# Patient Record
Sex: MALE | Race: WHITE | NOT HISPANIC OR LATINO | Employment: FULL TIME | URBAN - METROPOLITAN AREA
[De-identification: names, ages, dates, MRNs, and addresses within clinical notes are randomized per-mention and may not be internally consistent; named-entity substitution may affect disease eponyms.]

---

## 2017-11-27 ENCOUNTER — TRANSCRIBE ORDERS (OUTPATIENT)
Dept: ADMINISTRATIVE | Facility: HOSPITAL | Age: 42
End: 2017-11-27

## 2017-11-27 DIAGNOSIS — R94.5 NONSPECIFIC ABNORMAL RESULTS OF LIVER FUNCTION STUDY: Primary | ICD-10-CM

## 2017-11-27 DIAGNOSIS — D69.6 ACQUIRED THROMBOCYTOPENIA (HCC): ICD-10-CM

## 2017-11-28 ENCOUNTER — HOSPITAL ENCOUNTER (OUTPATIENT)
Dept: ULTRASOUND IMAGING | Facility: HOSPITAL | Age: 42
Discharge: HOME | End: 2017-11-28
Attending: INTERNAL MEDICINE
Payer: COMMERCIAL

## 2017-11-28 DIAGNOSIS — R94.5 NONSPECIFIC ABNORMAL RESULTS OF LIVER FUNCTION STUDY: ICD-10-CM

## 2017-11-28 DIAGNOSIS — D69.6 ACQUIRED THROMBOCYTOPENIA (HCC): ICD-10-CM

## 2017-11-28 PROCEDURE — 76700 US EXAM ABDOM COMPLETE: CPT

## 2017-12-20 LAB — HCV AB SER-ACNC: NON REACTIVE

## 2019-08-12 ENCOUNTER — HOSPITAL ENCOUNTER (EMERGENCY)
Facility: HOSPITAL | Age: 44
Discharge: HOME | End: 2019-08-12
Attending: EMERGENCY MEDICINE
Payer: COMMERCIAL

## 2019-08-12 VITALS
TEMPERATURE: 98.5 F | WEIGHT: 210 LBS | SYSTOLIC BLOOD PRESSURE: 132 MMHG | RESPIRATION RATE: 16 BRPM | OXYGEN SATURATION: 97 % | HEART RATE: 79 BPM | BODY MASS INDEX: 30.06 KG/M2 | DIASTOLIC BLOOD PRESSURE: 69 MMHG | HEIGHT: 70 IN

## 2019-08-12 DIAGNOSIS — M79.671 RIGHT FOOT PAIN: Primary | ICD-10-CM

## 2019-08-12 PROCEDURE — 99282 EMERGENCY DEPT VISIT SF MDM: CPT | Performed by: EMERGENCY MEDICINE

## 2019-08-12 PROCEDURE — 99282 EMERGENCY DEPT VISIT SF MDM: CPT

## 2019-08-12 NOTE — ED PROVIDER NOTES
History  Chief Complaint   Patient presents with    Foot Pain     Pt presents to the ED with right heel pain onset over the last 24 hrs ago  Pt reports hurts with ambulating or bearing weight  The patient presents with right heel pain which he first noticed this morning  He says he woke up with a cramp in his right heel but it went away  Pt is on his feet all day for work and this has only made the pain worse  He took 2 Advil but they wore off by lunch  He denies any recent trauma to the foot and states his pain is only present in the heel  None       Past Medical History:   Diagnosis Date    Hypertension     Lupus ([facility] [address] )        History reviewed  No pertinent surgical history  History reviewed  No pertinent family history  I have reviewed and agree with the history as documented  Social History     Tobacco Use    Smoking status: Never Smoker    Smokeless tobacco: Never Used   Substance Use Topics    Alcohol use: Not Currently    Drug use: Never        Review of Systems   Constitutional: Negative for fatigue and fever  Respiratory: Negative for cough and shortness of breath  Cardiovascular: Negative for chest pain and palpitations  Gastrointestinal: Negative for abdominal pain and nausea  Musculoskeletal: Positive for gait problem  Negative for back pain and joint swelling  Right heel pain    Skin: Negative for color change and wound  Neurological: Negative for light-headedness and headaches  All other systems reviewed and are negative        Physical Exam  ED Triage Vitals [08/12/19 1601]   Temperature Pulse Respirations Blood Pressure SpO2   98 5 °F (36 9 °C) 79 16 132/69 97 %      Temp Source Heart Rate Source Patient Position - Orthostatic VS BP Location FiO2 (%)   Oral Monitor Sitting Right arm --      Pain Score       8             Orthostatic Vital Signs  Vitals:    08/12/19 1601   BP: 132/69   Pulse: 79   Patient Position - Orthostatic VS: Sitting Physical Exam   Constitutional: He is oriented to person, place, and time  He appears well-developed and well-nourished  HENT:   Head: Normocephalic and atraumatic  Eyes: Pupils are equal, round, and reactive to light  Conjunctivae and EOM are normal    Cardiovascular: Normal rate, regular rhythm and normal heart sounds  Pulmonary/Chest: Effort normal and breath sounds normal    Abdominal: Soft  Bowel sounds are normal    Musculoskeletal: He exhibits tenderness  Right heel pain tenderness on palpation  There is some pain with dorsiflexion  There is no pain anywhere else in the foot  Neurological: He is alert and oriented to person, place, and time  Skin: Skin is warm and dry  Psychiatric: He has a normal mood and affect  His behavior is normal        ED Medications  Medications - No data to display    Diagnostic Studies  Results Reviewed     None                 No orders to display         Procedures  Procedures        ED Course                               MDM  Number of Diagnoses or Management Options  Right foot pain: new and requires workup  Diagnosis management comments: Patient presents with right heel pain that began this morning  He states that he had a cramp in his foot but eventually worked out  He is on his feet all day long for work and that is only exacerbated his pain throughout the day  He took 2 Advil this morning and they wore off by lunch  On physical exam there is tenderness to palpation in the heel but there is no pain in the arch or ball of the foot  Patient states he has had bone spurs on the top of his feet  Patient symptoms consistent with heel spur  Patient should rest and ice his heal   Patient can take 2-3 advil every 6hrs as needed  He should follow up with his orthopedist or a podiatrist if the problem continues  Provided him with a work night for light duty over the next 2 days         Disposition  Final diagnoses:   Right foot pain     Time reflects when diagnosis was documented in both MDM as applicable and the Disposition within this note     Time User Action Codes Description Comment    8/12/2019  4:20 PM Booker Schmidvirgil Add [C00 111] Right foot pain       ED Disposition     ED Disposition Condition Date/Time Comment    Discharge Stable Mon Aug 12, 2019  4:20  [address] discharge to home/self care  Follow-up Information     Follow up With Specialties Details Why Contact Info    Jacklyn Leung MD Internal Medicine  If symptoms worsen [place] [id number] [facility]  [phone]            There are no discharge medications for this patient  No discharge procedures on file  ED Provider  Attending physically available and evaluated [address]  I managed the patient along with the ED Attending      Electronically Signed by         Myra Maurer MD  08/12/19 8018

## 2019-08-12 NOTE — ED ATTENDING ATTESTATION
Jase Gentile MD, saw and evaluated the patient  I have discussed the patient with the resident/non-physician practitioner and agree with the resident's/non-physician practitioner's findings, Plan of Care, and MDM as documented in the resident's/non-physician practitioner's note, except where noted  All available labs and Radiology studies were reviewed  I was present for key portions of any procedure(s) performed by the resident/non-physician practitioner and I was immediately available to provide assistance  At this point I agree with the current assessment done in the Emergency Department  I have conducted an independent evaluation of this patient a history and physical is as follows: This is a 53 y o  old male who presents to the ED for evaluation of foot pain  Since yesterday, pain in the R heel  Sharp pain in heel with walking, specifically with applying pressure to the area  Took 2 ibuprofen today, no improvement  Works as , constantly on his feet  No trauma  VS and nursing notes reviewed  General: Appears in NAD, awake, alert, speaking normally in full sentences  Well-nourished, well-developed  Appears stated age  Head: Normocephalic, atraumatic  Eyes: EOMI  Vision grossly normal  No subconjunctival hemorrhages or occular discharge noted  Symmetrical lids  ENT: Atraumatic external nose and ears  No stridor  Normal phonation  No drooling  Normal swallowing  Neck: No JVD  FROM  No goiter  CV: No pallor  Normal rate  Lungs: No tachypnea  No respiratory distress  MSK: Moving all extremities equally, no peripheral edema  TTP R heel  NV intact  5/5 strngth  Skin: Dry, intact  No cyanosis  Neuro: Awake, alert, GCS15  CN II-XII grossly intact  Grossly normal gait  Psychiatric/Behavioral: Appropriate mood and affect  A/P: This is a 53 y o  male who presents to the ED for evaluation of foot pain  Etiology is unclear, possibly new shoes, vs heel spur   Will rec NSAIDs, ICE, work note for 2d of off loading        Critical Care Time  Procedures

## 2020-03-03 ENCOUNTER — APPOINTMENT (OUTPATIENT)
Dept: RADIOLOGY | Facility: AMBULARY SURGERY CENTER | Age: 45
End: 2020-03-03
Payer: COMMERCIAL

## 2020-03-03 VITALS
DIASTOLIC BLOOD PRESSURE: 86 MMHG | SYSTOLIC BLOOD PRESSURE: 136 MMHG | HEIGHT: 66 IN | WEIGHT: 236.2 LBS | HEART RATE: 87 BPM | BODY MASS INDEX: 37.96 KG/M2

## 2020-03-03 DIAGNOSIS — M25.551 RIGHT HIP PAIN: ICD-10-CM

## 2020-03-03 DIAGNOSIS — M25.551 RIGHT HIP PAIN: Primary | ICD-10-CM

## 2020-03-03 PROCEDURE — 73502 X-RAY EXAM HIP UNI 2-3 VIEWS: CPT

## 2020-03-03 PROCEDURE — 99243 OFF/OP CNSLTJ NEW/EST LOW 30: CPT | Performed by: PHYSICAL MEDICINE & REHABILITATION

## 2020-03-03 RX ORDER — LISINOPRIL 40 MG/1
40 TABLET ORAL DAILY
COMMUNITY
End: 2021-02-09 | Stop reason: SDUPTHER

## 2020-03-03 RX ORDER — ATENOLOL 50 MG/1
50 TABLET ORAL DAILY
COMMUNITY
End: 2021-06-29 | Stop reason: SDUPTHER

## 2020-03-03 RX ORDER — DIPHENOXYLATE HYDROCHLORIDE AND ATROPINE SULFATE 2.5; .025 MG/1; MG/1
2 TABLET ORAL DAILY
COMMUNITY

## 2020-03-03 RX ORDER — ASPIRIN 81 MG/1
81 TABLET, CHEWABLE ORAL DAILY
COMMUNITY

## 2020-03-03 NOTE — PROGRESS NOTES
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  No acute osseous abnormalities  Impression:  Right hip pain likely secondary to hip flexor/iliopsoas tendinitis which has fully resolved after a steroid burst   The patient does have a history of systemic lupus erythematosus and is susceptible to having avascular necrosis of the femoral head  In light of this, we obtained x-rays of his right hip which show a cam deformity of the femoral head  At this point, his pain has resolved after the steroid burst   His examination today is within normal limits  I will see him back if needed  No follow-ups on file  HPI:  Nvaid Ridley is a 50 y o  male  who presents for evaluation of   Chief Complaint   Patient presents with    Left Leg - Pain     Onset/Mechanism:  Pain that he has had for many months  Denies any inciting factor  Location:  Right groin and anterior hip region  Radiation: To about mid quadriceps region  Quality:  Not applicable now but it was throbbing  Provocative:  Not applicable  Severity:  0/10  Associated Symptoms:  When it had happened, he felt like his leg was giving out on him  Treatment so far:  Steroid burst which fully resolved all his symptoms  Review of Systems   Constitutional: Positive for activity change  Negative for fever  HENT: Negative for sore throat  Eyes: Negative for visual disturbance  Respiratory: Negative for shortness of breath  Cardiovascular: Negative for chest pain  Gastrointestinal: Negative for abdominal pain  Endocrine: Negative for polydipsia  Genitourinary: Negative for difficulty urinating  Musculoskeletal: Positive for arthralgias  Skin: Negative for rash  Allergic/Immunologic: Negative for immunocompromised state  Neurological: Negative for numbness  Hematological: Does not bruise/bleed easily  Psychiatric/Behavioral: Negative for confusion  Following history reviewed and updated:  Past Medical History:   Diagnosis Date    Hypertension     Lupus ([address] )      History reviewed  No pertinent surgical history  Social History   Social History     Substance and Sexual Activity   Alcohol Use Not Currently     Social History     Substance and Sexual Activity   Drug Use Never     Social History     Tobacco Use   Smoking Status Never Smoker   Smokeless Tobacco Never Used     History reviewed  No pertinent family history  No Known Allergies     Constitutional:  /86   Pulse 87   Ht 5' 6" (1 676 m)   Wt 107 kg (236 lb 3 2 oz)   BMI 38 12 kg/m²    General: NAD  Eyes: Anicteric sclerae  Neck: Supple  Lungs: Unlabored breathing  Cardiovascular: No lower extremity edema  Skin: Intact without erythema  Neurologic: Sensation intact to light touch  Psychiatric: Mood and affect are appropriate  Right Hip Exam     Tenderness   The patient is experiencing no tenderness  Range of Motion   The patient has normal right hip ROM  Muscle Strength   The patient has normal right hip strength  Tests   ZARIA: negative    Other   Erythema: absent  Scars: absent  Sensation: normal  Pulse: present      Back Exam     Tenderness   The patient is experiencing no tenderness  Range of Motion   The patient has normal back ROM  Muscle Strength   The patient has normal back strength  Reflexes   Patellar: 3/4  Achilles: 3/4    Other   Toe walk: normal  Heel walk: normal  Sensation: normal  Gait: normal   Erythema: no back redness  Scars: absent             Procedures - none for this visit  This document was recorded using voice recognition software and errors may be noted

## 2020-03-03 NOTE — LETTER
To Whom It May Concern,    Elaine De is under my professional care  He was seen in my office on March 3, 2020  Please excuse Elaine De from work missed on this appointment date  If you have any questions or concerns, please don't hesitate to call          Sincerely,          Morgan Villar, DO

## 2021-02-09 DIAGNOSIS — I10 ESSENTIAL HYPERTENSION: Primary | ICD-10-CM

## 2021-02-09 RX ORDER — LISINOPRIL 40 MG/1
40 TABLET ORAL DAILY
Qty: 90 TABLET | Refills: 1 | Status: SHIPPED | OUTPATIENT
Start: 2021-02-09 | End: 2021-08-10 | Stop reason: SDUPTHER

## 2021-02-27 PROBLEM — M32.9 SLE (SYSTEMIC LUPUS ERYTHEMATOSUS RELATED SYNDROME) (HCC): Status: ACTIVE | Noted: 2021-02-27

## 2021-02-27 PROBLEM — E55.9 VITAMIN D DEFICIENCY: Status: ACTIVE | Noted: 2021-02-27

## 2021-02-27 PROBLEM — R73.9 HYPERGLYCEMIA: Status: ACTIVE | Noted: 2021-02-27

## 2021-02-27 PROBLEM — E78.2 MIXED HYPERLIPIDEMIA: Status: ACTIVE | Noted: 2021-02-27

## 2021-02-27 PROBLEM — D69.6 THROMBOCYTOPENIA (HCC): Status: ACTIVE | Noted: 2021-02-27

## 2021-02-27 PROBLEM — I10 ESSENTIAL HYPERTENSION: Status: ACTIVE | Noted: 2021-02-27

## 2021-02-27 PROBLEM — K21.9 GE REFLUX: Status: ACTIVE | Noted: 2021-02-27

## 2021-02-28 ENCOUNTER — TELEPHONE (OUTPATIENT)
Dept: OTHER | Facility: OTHER | Age: 46
End: 2021-02-28

## 2021-03-31 ENCOUNTER — TELEMEDICINE (OUTPATIENT)
Dept: INTERNAL MEDICINE CLINIC | Facility: CLINIC | Age: 46
End: 2021-03-31
Payer: COMMERCIAL

## 2021-03-31 DIAGNOSIS — I10 ESSENTIAL HYPERTENSION: ICD-10-CM

## 2021-03-31 DIAGNOSIS — J06.9 UPPER RESPIRATORY TRACT INFECTION, UNSPECIFIED TYPE: Primary | ICD-10-CM

## 2021-03-31 DIAGNOSIS — M32.9 SLE (SYSTEMIC LUPUS ERYTHEMATOSUS RELATED SYNDROME) (HCC): ICD-10-CM

## 2021-03-31 PROCEDURE — 99213 OFFICE O/P EST LOW 20 MIN: CPT | Performed by: INTERNAL MEDICINE

## 2021-03-31 RX ORDER — AZITHROMYCIN 250 MG/1
TABLET, FILM COATED ORAL
Qty: 6 TABLET | Refills: 0 | Status: SHIPPED | OUTPATIENT
Start: 2021-03-31 | End: 2021-04-05

## 2021-03-31 NOTE — ASSESSMENT & PLAN NOTE
Viral versus bacterial infection  Advised to increase fluids  Advised to take acetaminophen p r n  for fever     Will check for COVID-19 infection  Meanwhile will start Z-Carlton  Patient advised to stay home until get COVID-19 test result

## 2021-03-31 NOTE — ASSESSMENT & PLAN NOTE
Patient had seen Rheumatology in the past   Presently only has is facial rash otherwise does not have any other significant SLE symptoms

## 2021-03-31 NOTE — PROGRESS NOTES
Virtual Brief Visit  Virtual visit was attempted to perform by using the audion and video component  Patient unable to connect to video component so visit was performed by using only audio component  Assessment/Plan:    Problem List Items Addressed This Visit        Respiratory    Upper respiratory tract infection - Primary     Viral versus bacterial infection  Advised to increase fluids  Advised to take acetaminophen p r n  for fever     Will check for COVID-19 infection  Meanwhile will start Z-Carlton  Patient advised to stay home until get COVID-19 test result  Relevant Medications    azithromycin (Zithromax) 250 mg tablet    Other Relevant Orders    Novel Coronavirus (Covid-19),PCR [facility]       Cardiovascular and Mediastinum    Essential hypertension     Patient states his blood pressure is okay  Advised to continue present medications  Musculoskeletal and Integument    SLE (systemic lupus erythematosus related syndrome) ([facility] [address] )     Patient had seen Rheumatology in the past   Presently only has is facial rash otherwise does not have any other significant SLE symptoms  Reason for visit is No chief complaint on file  Encounter provider Jaycee Price MD    Provider located at [address] [place] [address] Road  [phone]    Recent Visits  No visits were found meeting these conditions  Showing recent visits within past 7 days and meeting all other requirements     Today's Visits  Date Type Provider Dept   03/31/21 Telemedicine Jayece Price MD [address] [facility]   Showing today's visits and meeting all other requirements     Future Appointments  No visits were found meeting these conditions  Showing future appointments within next 150 days and meeting all other requirements        After connecting through telephone, the patient was identified by name and date of birth   Filemon Deya Zaheer was informed that this is a telemedicine visit and that the visit is being conducted through telephone  My office door was closed  No one else was in the room  He acknowledged consent and understanding of privacy and security of the platform  The patient has agreed to participate and understands he can discontinue the visit at any time  Patient is aware this is a billable service  Subjective:  Fever, sore throat, sinus congestion    Cherise Noyola is a 48 y o  male   HPI   78-year-old white male patient complain of developed fever and sore throat and sinus congestion since yesterday  Has slight cough  Denies any chest pain or shortness of breath  Denies any nausea, vomiting, diarrhea  Denies any exposure to known case of COVID-19 infected person  Patient started taking coricidin  BP  Geanie Gibes Sore throat seems to be worsening  Past Medical History:   Diagnosis Date    Allergic rhinitis     Constipation     Elevated liver enzymes     Elevated liver enzymes     Essential hypertension 2/27/2021    Fatigue     GE reflux     GE reflux 2/27/2021    Hyperglycemia 2/27/2021    Hypertension     Lupus (HCC)     Mixed hyperlipidemia 2/27/2021    Obesity     Rosacea     Skin lesion     SLE (systemic lupus erythematosus related syndrome) ([facility] [address] ) 2/27/2021    Systemic lupus erythematosus (HCC)     Thrombocytasthenia ([facility] [address] )     Thrombocytopenia ([facility] [address] ) 2/27/2021    Vitamin D deficiency     Vitamin D deficiency 2/27/2021       Past Surgical History:   Procedure Laterality Date    COLONOSCOPY  01/12/2018    by Dr Minerva Johnson; needs f/u 1/2023        Current Outpatient Medications   Medication Sig Dispense Refill    aspirin 81 mg chewable tablet Chew 81 mg daily      atenolol (TENORMIN) 50 mg tablet Take 50 mg by mouth daily      azithromycin (Zithromax) 250 mg tablet Take 2 tablets (500 mg total) by mouth daily for 1 day, THEN 1 tablet (250 mg total) daily for 4 days   6 tablet 0    Cholecalciferol (Vitamin D3 Super Strength) 50 MCG (2000 UT) TABS Take by mouth      lisinopril (ZESTRIL) 40 mg tablet Take 1 tablet (40 mg total) by mouth daily 90 tablet 1    multivitamin (THERAGRAN) TABS Take 2 tablets by mouth daily      Vitamin D, Cholecalciferol, 50 MCG (2000 UT) CAPS Take 1 capsule by mouth daily       No current facility-administered medications for this visit  Allergies   Allergen Reactions    Omeprazole GI Intolerance     He developed constipation       Review of Systems   Constitutional: Positive for fatigue and fever  Negative for chills  HENT: Positive for congestion, rhinorrhea and sore throat  Negative for ear pain, postnasal drip, sinus pain and trouble swallowing  Eyes: Negative for pain and visual disturbance  Respiratory: Positive for cough (Slight cough)  Negative for chest tightness and shortness of breath  Cardiovascular: Negative for chest pain, palpitations and leg swelling  Gastrointestinal: Negative for abdominal pain, blood in stool, constipation, diarrhea and nausea  Genitourinary: Negative for dysuria, flank pain and frequency  Musculoskeletal: Negative for arthralgias and myalgias  Skin: Negative for rash  Neurological: Negative for dizziness, speech difficulty, weakness and headaches  Hematological: Does not bruise/bleed easily  Psychiatric/Behavioral: Negative for behavioral problems  On the phone patient appeared in no acute distress  Patient was awake alert oriented x3  There were no vitals filed for this visit  I spent 20 minutes directly with the patient during this visit    VIRTUAL VISIT [address] acknowledges that he has consented to an online visit or consultation   He understands that the online visit is based solely on information provided by him, and that, in the absence of a face-to-face physical evaluation by the physician, the diagnosis he receives is both limited and provisional in terms of accuracy and completeness  This is not intended to replace a full medical face-to-face evaluation by the physician  Cortez Greco understands and accepts these terms

## 2021-04-01 DIAGNOSIS — J06.9 UPPER RESPIRATORY TRACT INFECTION, UNSPECIFIED TYPE: ICD-10-CM

## 2021-04-01 LAB — SARS-COV-2 RNA RESP QL NAA+PROBE: NEGATIVE

## 2021-04-01 PROCEDURE — U0003 INFECTIOUS AGENT DETECTION BY NUCLEIC ACID (DNA OR RNA); SEVERE ACUTE RESPIRATORY SYNDROME CORONAVIRUS 2 (SARS-COV-2) (CORONAVIRUS DISEASE [COVID-19]), AMPLIFIED PROBE TECHNIQUE, MAKING USE OF HIGH THROUGHPUT TECHNOLOGIES AS DESCRIBED BY CMS-2020-01-R: HCPCS | Performed by: INTERNAL MEDICINE

## 2021-04-01 PROCEDURE — U0005 INFEC AGEN DETEC AMPLI PROBE: HCPCS | Performed by: INTERNAL MEDICINE

## 2021-04-02 ENCOUNTER — TELEPHONE (OUTPATIENT)
Dept: INTERNAL MEDICINE CLINIC | Facility: CLINIC | Age: 46
End: 2021-04-02

## 2021-04-13 ENCOUNTER — TELEPHONE (OUTPATIENT)
Dept: GASTROENTEROLOGY | Facility: CLINIC | Age: 46
End: 2021-04-13

## 2021-04-13 ENCOUNTER — PREP FOR PROCEDURE (OUTPATIENT)
Dept: GASTROENTEROLOGY | Facility: CLINIC | Age: 46
End: 2021-04-13

## 2021-04-13 DIAGNOSIS — K21.00 GASTROESOPHAGEAL REFLUX DISEASE WITH ESOPHAGITIS, UNSPECIFIED WHETHER HEMORRHAGE: Primary | ICD-10-CM

## 2021-04-13 NOTE — TELEPHONE ENCOUNTER
Received message from patient that he is calling to schedule EGD with Dr Cristobal Barney for reflux esophagitis  He stated he is available after 3pm, works M-F 7-3  I will call him later today

## 2021-04-15 ENCOUNTER — IMMUNIZATIONS (OUTPATIENT)
Dept: FAMILY MEDICINE CLINIC | Facility: HOSPITAL | Age: 46
End: 2021-04-15

## 2021-04-15 DIAGNOSIS — Z23 ENCOUNTER FOR IMMUNIZATION: Primary | ICD-10-CM

## 2021-04-15 PROCEDURE — 91300 SARS-COV-2 / COVID-19 MRNA VACCINE (PFIZER-BIONTECH) 30 MCG: CPT

## 2021-04-15 PROCEDURE — 0001A SARS-COV-2 / COVID-19 MRNA VACCINE (PFIZER-BIONTECH) 30 MCG: CPT

## 2021-05-06 ENCOUNTER — HOSPITAL ENCOUNTER (OUTPATIENT)
Dept: GASTROENTEROLOGY | Facility: AMBULATORY SURGERY CENTER | Age: 46
Discharge: HOME | End: 2021-05-06
Payer: COMMERCIAL

## 2021-05-06 ENCOUNTER — ANESTHESIA (OUTPATIENT)
Dept: GASTROENTEROLOGY | Facility: AMBULATORY SURGERY CENTER | Age: 46
End: 2021-05-06
Payer: COMMERCIAL

## 2021-05-06 ENCOUNTER — ANESTHESIA EVENT (OUTPATIENT)
Dept: GASTROENTEROLOGY | Facility: AMBULATORY SURGERY CENTER | Age: 46
End: 2021-05-06

## 2021-05-06 VITALS
WEIGHT: 223 LBS | OXYGEN SATURATION: 97 % | TEMPERATURE: 97.2 F | HEART RATE: 84 BPM | RESPIRATION RATE: 18 BRPM | SYSTOLIC BLOOD PRESSURE: 117 MMHG | BODY MASS INDEX: 37.15 KG/M2 | HEIGHT: 65 IN | DIASTOLIC BLOOD PRESSURE: 82 MMHG

## 2021-05-06 DIAGNOSIS — K21.00 GASTROESOPHAGEAL REFLUX DISEASE WITH ESOPHAGITIS, UNSPECIFIED WHETHER HEMORRHAGE: ICD-10-CM

## 2021-05-06 PROCEDURE — 43251 EGD REMOVE LESION SNARE: CPT | Performed by: INTERNAL MEDICINE

## 2021-05-06 PROCEDURE — 00731 ANES UPR GI NDSC PX NOS: CPT | Performed by: NURSE ANESTHETIST, CERTIFIED REGISTERED

## 2021-05-06 PROCEDURE — 43239 EGD BIOPSY SINGLE/MULTIPLE: CPT | Performed by: INTERNAL MEDICINE

## 2021-05-06 RX ORDER — PROPOFOL 10 MG/ML
INJECTION, EMULSION INTRAVENOUS AS NEEDED
Status: DISCONTINUED | OUTPATIENT
Start: 2021-05-06 | End: 2021-05-06

## 2021-05-06 RX ORDER — LIDOCAINE HYDROCHLORIDE 10 MG/ML
INJECTION, SOLUTION EPIDURAL; INFILTRATION; INTRACAUDAL; PERINEURAL AS NEEDED
Status: DISCONTINUED | OUTPATIENT
Start: 2021-05-06 | End: 2021-05-06

## 2021-05-06 RX ORDER — SODIUM CHLORIDE 9 MG/ML
20 INJECTION, SOLUTION INTRAVENOUS CONTINUOUS
Status: DISCONTINUED | OUTPATIENT
Start: 2021-05-06 | End: 2021-05-10 | Stop reason: HOSPADM

## 2021-05-06 RX ORDER — SODIUM CHLORIDE 9 MG/ML
30 INJECTION, SOLUTION INTRAVENOUS CONTINUOUS
Status: DISCONTINUED | OUTPATIENT
Start: 2021-05-06 | End: 2021-05-10 | Stop reason: HOSPADM

## 2021-05-06 RX ADMIN — PROPOFOL 50 MG: 10 INJECTION, EMULSION INTRAVENOUS at 12:10

## 2021-05-06 RX ADMIN — LIDOCAINE HYDROCHLORIDE 50 MG: 10 INJECTION, SOLUTION EPIDURAL; INFILTRATION; INTRACAUDAL; PERINEURAL at 12:00

## 2021-05-06 RX ADMIN — PROPOFOL 150 MG: 10 INJECTION, EMULSION INTRAVENOUS at 12:00

## 2021-05-06 RX ADMIN — SODIUM CHLORIDE: 9 INJECTION, SOLUTION INTRAVENOUS at 11:55

## 2021-05-06 RX ADMIN — PROPOFOL 150 MG: 10 INJECTION, EMULSION INTRAVENOUS at 12:06

## 2021-05-06 NOTE — DISCHARGE INSTRUCTIONS
Pantoprazole (By mouth)   Pantoprazole (pan-TOE-pra-zole)  Treats gastroesophageal reflux disease (GERD), a damaged esophagus, and conditions that cause your stomach to make too much acid, including Zollinger-Lincoln syndrome  This medicine is a proton pump inhibitor (PPI)  Brand Name(s): Protonix   There may be other brand names for this medicine  When This Medicine Should Not Be Used: This medicine is not right for everyone  Do not use it if you had an allergic reaction to pantoprazole or similar medicines  How to Use This Medicine:   Packet, Tablet, Delayed Release Tablet, Long Acting Tablet  · Your doctor will tell you how much medicine to use  Do not use more than directed  · Delayed-release tablet: Swallow the tablet whole  Do not crush, break, or chew it  · Delayed-release packet: Take the medicine mixed in apple juice or applesauce at least 30 minutes before a meal  It may also be given using a nasogastric tube when mixed in apple juice only  ? To prepare with applesauce:   § Mix the packet contents with 1 teaspoon of applesauce  Do not mix with water or other liquids or food  Do not divide the packet contents to make smaller doses  § Swallow the mixture within 10 minutes after you mix it  Do not chew or crush the granules  § Sip some water after you take the mixture to make sure you swallow all of the medicine  ? To prepare with apple juice:   § Mix the packet contents with 1 teaspoon of apple juice in a small cup  Do not divide the packet contents to make smaller doses  § Stir for 5 seconds and drink the mixture immediately  Do not chew or crush the granules  § To make sure you get all of the medicine, add more apple juice to the cup  Drink it immediately  ? To prepare for a feeding tube:   § Pour the packet contents in a 2-ounce (60 milliliter [mL]) catheter-tip syringe  § Add 10 mL of apple juice to the syringe  Add the mixture to the tube   Gently tap or shake the barrel of the syringe to help empty it  § Add 10 mL of apple juice to the syringe and put it in the tube  Do this at least 2 times  There should be no granules left in the syringe  · This medicine should come with a Medication Guide  Ask your pharmacist for a copy if you do not have one  · Missed dose: Take a dose as soon as you remember  If it is almost time for your next dose, wait until then and take a regular dose  Do not take extra medicine to make up for a missed dose  · Store the medicine in a closed container at room temperature, away from heat, moisture, and direct light  Drugs and Foods to Avoid:   Ask your doctor or pharmacist before using any other medicine, including over-the-counter medicines, vitamins, and herbal products  · Do not use pantoprazole together with medicine that contains rilpivirine  · Some medicines can affect how pantoprazole works  Tell your doctor if you are using any of the following:   ? Ampicillin, atazanavir, dasatinib, digoxin, erlotinib, itraconazole, ketoconazole, methotrexate, mycophenolate mofetil, nelfinavir, nilotinib, saquinavir  ? Blood thinner (including warfarin)  ? Diuretic (water pill)  ? Iron supplements  Warnings While Using This Medicine:   · Tell your doctor if you are pregnant or breastfeeding, or if you have kidney disease, liver disease, lupus, or osteoporosis  · This medicine may cause the following problems:  ? Kidney problems, including acute tubulointerstitial nephritis  ? Increased risk of broken bones in the hip, wrist, or spine (more likely if used several times per day or longer than 1 year)  ? Lupus  ? Fundic gland polyps (abnormal growth in the upper part of your stomach)  · This medicine can cause diarrhea  Call your doctor if the diarrhea becomes severe, does not stop, or is bloody  Do not take any medicine to stop diarrhea until you have talked to your doctor  Diarrhea can occur 2 months or more after you stop taking this medicine    · Tell any doctor or dentist who treats you that you are using this medicine  This medicine may affect certain medical test results  · Your doctor will do lab tests at regular visits to check on the effects of this medicine  Keep all appointments  · Keep all medicine out of the reach of children  Never share your medicine with anyone  Possible Side Effects While Using This Medicine:   Call your doctor right away if you notice any of these side effects:  · Allergic reaction: Itching or hives, swelling in your face or hands, swelling or tingling in your mouth or throat, chest tightness, trouble breathing  · Blistering, peeling, red skin rash  · Fever, joint pain, swelling in your body, unusual weight gain, change in how much or how often you urinate, blood in the urine  · Joint pain, rash on your cheeks or arms that gets worse in the sun  · Seizures, dizziness, uneven heartbeat, muscle cramps or twitching  · Severe diarrhea, stomach cramp or pain, nausea, vomiting, loss of appetite, weight loss  · Unusual tiredness or weakness  If you notice these less serious side effects, talk with your doctor:   · Headache  If you notice other side effects that you think are caused by this medicine, tell your doctor  Call your doctor for medical advice about side effects  You may report side effects to FDA at 5-447-FDA-1062  © Copyright [employer] 2021 Information is for End User's use only and may not be sold, redistributed or otherwise used for commercial purposes  The above information is an  only  It is not intended as medical advice for individual conditions or treatments  Talk to your doctor, nurse or pharmacist before following any medical regimen to see if it is safe and effective for you  Upper Endoscopy   WHAT YOU NEED TO KNOW:   An upper endoscopy is also called an upper gastrointestinal (GI) endoscopy, or an esophagogastroduodenoscopy (EGD)   It is a procedure to examine the inside of your esophagus, stomach, and duodenum (first part of the small intestine) with a scope  You may feel bloated, gassy, or have some abdominal discomfort after your procedure  Your throat may be sore for 24 to 36 hours  You may burp or pass gas from air that is still inside your body  DISCHARGE INSTRUCTIONS:   Seek care immediately if:    You have sudden, severe abdominal pain   You have problems swallowing   You have a large amount of black, sticky bowel movements or blood in your bowel movements   You have sudden trouble breathing   You feel weak, lightheaded, or faint or your heart beats faster than normal for you  Contact your healthcare provider if:    You have a fever and chills   You have nausea or are vomiting   Your abdomen is bloated or feels full and hard   You have abdominal pain   You have black, sticky bowel movements or blood in your bowel movements   You have not had a bowel movement for 3 days after your procedure   You have rash or hives   You have questions or concerns about your procedure  Activity:    Do not lift, strain, or run for 24 hours after your procedure   Rest after your procedure  You have been given medicine to relax you  Do not drive or make important decisions until the day after your procedure  Return to your normal activity as directed   Relieve gas and discomfort from bloating by lying on your right side with a heating pad on your abdomen  You may need to take short walks to help the gas move out  Eat small meals until bloating is relieved  Follow up with your healthcare provider as directed: Write down your questions so you remember to ask them during your visits  If you take a blood thinner, please review the specific instructions from your endoscopist about when you should resume it  These can be found in the Recommendation and Your Medication list sections of this After Visit Summary    Gastroesophageal Reflux Disease   WHAT YOU NEED TO KNOW:   Gastroesophageal reflux disease (GERD) is reflux that occurs more than twice a week for a few weeks  Reflux means acid and food in the stomach back up into the esophagus  It usually causes heartburn and other symptoms  GERD can cause other health problems over time if it is not treated  DISCHARGE INSTRUCTIONS:   Call your local emergency number (911 in the [address]) if:   · You have severe chest pain and sudden trouble breathing  Seek care immediately if:   · You have trouble breathing after you vomit  · You have trouble swallowing, or pain with swallowing  · Your bowel movements are black, bloody, or tarry-looking  · Your vomit looks like coffee grounds or has blood in it  Call your doctor or gastroenterologist if:   · You feel full and cannot burp or vomit  · You vomit large amounts, or you vomit often  · You are losing weight without trying  · Your symptoms get worse or do not improve with treatment  · You have questions or concerns about your condition or care  Medicines:   · Medicines  are used to decrease stomach acid  Medicine may also be used to help your lower esophageal sphincter and stomach contract (tighten) more  · Take your medicine as directed  Contact your healthcare provider if you think your medicine is not helping or if you have side effects  Tell him or her if you are allergic to any medicine  Keep a list of the medicines, vitamins, and herbs you take  Include the amounts, and when and why you take them  Bring the list or the pill bottles to follow-up visits  Carry your medicine list with you in case of an emergency  Manage GERD:   · Do not have foods or drinks that may increase heartburn  These include chocolate, peppermint, fried or fatty foods, drinks that contain caffeine, or carbonated drinks (soda)  Other foods include spicy foods, onions, tomatoes, and tomato-based foods   Do not have foods or drinks that can irritate your esophagus, such as citrus fruits, juices, and alcohol  · Do not eat large meals  When you eat a lot of food at one time, your stomach needs more acid to digest it  Eat 6 small meals each day instead of 3 large ones, and eat slowly  Do not eat meals 2 to 3 hours before bedtime  · Elevate the head of your bed  Place 6-inch blocks under the head of your bed frame  You may also use more than one pillow under your head and shoulders while you sleep  · Maintain a healthy weight  If you are overweight, weight loss may help relieve symptoms of GERD  · Do not smoke  Smoking weakens the lower esophageal sphincter and increases the risk of GERD  Ask your healthcare provider for information if you currently smoke and need help to quit  E-cigarettes or smokeless tobacco still contain nicotine  Talk to your healthcare provider before you use these products  · Do not wear clothing that is tight around your waist   Tight clothing can put pressure on your stomach and cause or worsen GERD symptoms  Follow up with your doctor or gastroenterologist as directed:  Write down your questions so you remember to ask them during your visits  © Copyright [address] Information is for End User's use only and may not be sold, redistributed or otherwise used for commercial purposes  All illustrations and images included in CareNotes® are the copyrighted property of [employer]  or [facility] Michael Vaughn   The above information is an  only  It is not intended as medical advice for individual conditions or treatments  Talk to your doctor, nurse or pharmacist before following any medical regimen to see if it is safe and effective for you

## 2021-05-06 NOTE — H&P
[facility] and Physical -  [facility]  Mor Squires Zaheer 48 y o  male MRN: [id number]                  HPI: Isaak Smiley is a 48y o  year old male who presents for GERD      REVIEW OF SYSTEMS: Per the HPI, and otherwise unremarkable      Historical Information   Past Medical History:   Diagnosis Date    Allergic rhinitis     Constipation     Elevated liver enzymes     hx of    Elevated liver enzymes     Essential hypertension 2/27/2021    Fatigue     Gastric polyps     history of    GE reflux     GE reflux 2/27/2021    GERD (gastroesophageal reflux disease)     Hyperglycemia 2/27/2021    Hypertension     Lupus ([facility] [address] [facility]     Mixed hyperlipidemia 2/27/2021    Obesity     Rosacea     Seizures ([facility] [address] )     6year old sport injury only-no recurrance    Skin lesion     SLE (systemic lupus erythematosus related syndrome) ([facility] [address] ) 2/27/2021    Systemic lupus erythematosus ([facility] [address] )     Thrombocytasthenia ([facility] [address] )     Thrombocytopenia ([facility] [address] ) 2/27/2021    Vitamin D deficiency     Vitamin D deficiency 2/27/2021     Past Surgical History:   Procedure Laterality Date    COLONOSCOPY  01/12/2018    by Dr Jonny Garnica; needs f/u 1/2023     COLONOSCOPY      EGD       Social History   Social History     Substance and Sexual Activity   Alcohol Use Yes    Frequency: 2-4 times a month    Drinks per session: 1 or 2    Binge frequency: Never    Comment: Occasional - As per [employer]-wine or beer     Social History     Substance and Sexual Activity   Drug Use Never     Social History     Tobacco Use   Smoking Status Light Tobacco Smoker    Types: Cigars   Smokeless Tobacco Never Used   Tobacco Comment    once a month-cigar     Family History   Problem Relation Age of Onset    Dementia Mother     Colon cancer Father     Cancer Father         colon    Lung cancer Paternal Grandfather     Colon cancer Paternal Aunt     Skin cancer Paternal Uncle     Colon cancer Maternal Aunt        Meds/Allergies Current Outpatient Medications:     aspirin 81 mg chewable tablet    atenolol (TENORMIN) 50 mg tablet    Cholecalciferol (Vitamin D3 Super Strength) 50 MCG (2000 UT) TABS    lisinopril (ZESTRIL) 40 mg tablet    multivitamin (THERAGRAN) TABS    Vitamin D, Cholecalciferol, 50 MCG (2000 UT) CAPS    Current Facility-Administered Medications:     sodium chloride 0 9 % infusion, 30 mL/hr, Intravenous, Continuous, New Bag at 05/06/21 1155    Facility-Administered Medications Ordered in Other Encounters:     lidocaine (PF) (XYLOCAINE-MPF) 1 % injection, , , PRN, 50 mg at 05/06/21 1200    propofol (DIPRIVAN) 200 MG/20ML bolus injection, , Intravenous, PRN, 150 mg at 05/06/21 1200    Allergies   Allergen Reactions    Omeprazole GI Intolerance     He developed constipation       Objective     /89   Pulse 67   Temp (!) 97 2 °F (36 2 °C) (Temporal)   Resp 18   Ht 5' 5" (1 651 m)   Wt 101 kg (223 lb)   SpO2 96%   BMI 37 11 kg/m²       PHYSICAL EXAM    Gen: NAD  Head: NCAT  CV: RRR  CHEST: Clear  ABD: soft, NT/ND  EXT: no edema      ASSESSMENT/PLAN:  This is a 48y o  year old male here for EGD, and he is stable and optimized for his procedure

## 2021-05-06 NOTE — ANESTHESIA POSTPROCEDURE EVALUATION
Post-Op Assessment Note    CV Status:  Stable  Pain Score: 0    Pain management: adequate     Mental Status:  Sleepy   Hydration Status:  Stable   PONV Controlled:  None   Airway Patency:  Patent      Post Op Vitals Reviewed: Yes      Staff: CRNA         No complications documented      BP   107/62   Temp     Pulse  80   Resp   16   SpO2   98

## 2021-05-06 NOTE — ANESTHESIA PREPROCEDURE EVALUATION
Procedure:  EGD    Relevant Problems   CARDIO   (+) Essential hypertension   (+) Mixed hyperlipidemia      GI/HEPATIC   (+) GE reflux      HEMATOLOGY   (+) Thrombocytopenia (HCC)      PULMONARY   (+) Upper respiratory tract infection        Physical Exam    Airway    Mallampati score: II  TM Distance: >3 FB  Neck ROM: full     Dental   No notable dental hx     Cardiovascular  Rhythm: regular, Rate: normal, Cardiovascular exam normal    Pulmonary  Pulmonary exam normal     Other Findings        Anesthesia Plan  ASA Score- 2     Anesthesia Type- IV sedation with anesthesia with ASA Monitors  Additional Monitors:   Airway Plan:           Plan Factors-    Induction- intravenous  Postoperative Plan-     Informed Consent- Anesthetic plan and risks discussed with patient

## 2021-05-07 ENCOUNTER — IMMUNIZATIONS (OUTPATIENT)
Dept: FAMILY MEDICINE CLINIC | Facility: HOSPITAL | Age: 46
End: 2021-05-07

## 2021-05-07 DIAGNOSIS — Z23 ENCOUNTER FOR IMMUNIZATION: Primary | ICD-10-CM

## 2021-05-07 PROCEDURE — 91300 SARS-COV-2 / COVID-19 MRNA VACCINE (PFIZER-BIONTECH) 30 MCG: CPT

## 2021-05-07 PROCEDURE — 0002A SARS-COV-2 / COVID-19 MRNA VACCINE (PFIZER-BIONTECH) 30 MCG: CPT

## 2021-05-20 ENCOUNTER — TELEPHONE (OUTPATIENT)
Dept: GASTROENTEROLOGY | Facility: CLINIC | Age: 46
End: 2021-05-20

## 2021-05-20 NOTE — TELEPHONE ENCOUNTER
Called patient and informed him of EGD biopsy results, please make sure EGD/ colonoscopy recall is placed for January 2023 per Dr Suzie Deng EGD  recommendations   Thank you

## 2021-06-29 ENCOUNTER — TELEPHONE (OUTPATIENT)
Dept: INTERNAL MEDICINE CLINIC | Facility: CLINIC | Age: 46
End: 2021-06-29

## 2021-06-29 DIAGNOSIS — I10 ESSENTIAL HYPERTENSION: Primary | ICD-10-CM

## 2021-06-29 RX ORDER — ATENOLOL 50 MG/1
50 TABLET ORAL DAILY
Qty: 90 TABLET | Refills: 0 | Status: SHIPPED | OUTPATIENT
Start: 2021-06-29 | End: 2021-10-01 | Stop reason: SDUPTHER

## 2021-06-29 NOTE — TELEPHONE ENCOUNTER
Mishel Munoz please call and schedule him to see me in next couple week as has not seen for last 6 montths    I will refill his medication

## 2021-08-10 ENCOUNTER — TELEPHONE (OUTPATIENT)
Dept: INTERNAL MEDICINE CLINIC | Facility: CLINIC | Age: 46
End: 2021-08-10

## 2021-08-10 DIAGNOSIS — M32.9 SLE (SYSTEMIC LUPUS ERYTHEMATOSUS RELATED SYNDROME) (HCC): ICD-10-CM

## 2021-08-10 DIAGNOSIS — I10 ESSENTIAL HYPERTENSION: ICD-10-CM

## 2021-08-10 DIAGNOSIS — E55.9 VITAMIN D DEFICIENCY: ICD-10-CM

## 2021-08-10 DIAGNOSIS — I10 ESSENTIAL HYPERTENSION: Primary | ICD-10-CM

## 2021-08-10 DIAGNOSIS — E78.2 MIXED HYPERLIPIDEMIA: ICD-10-CM

## 2021-08-10 RX ORDER — LISINOPRIL 40 MG/1
40 TABLET ORAL DAILY
Qty: 90 TABLET | Refills: 0 | Status: SHIPPED | OUTPATIENT
Start: 2021-08-10 | End: 2021-11-16 | Stop reason: SDUPTHER

## 2021-08-10 NOTE — TELEPHONE ENCOUNTER
Scheduled apt for 10/19 (needed late time)     Do you want him to have labs done prior? Also he will need a refill of his Lisinopril 40 mg - sent to [facility]

## 2021-08-11 NOTE — TELEPHONE ENCOUNTER
Please let him know that I ordered his medication  He will need blood test before next appointment    I ordered the  blood test and urine test so he can go to any [facility] without prescription or he can stop by to get prescription of his blood test

## 2021-08-31 ENCOUNTER — OFFICE VISIT (OUTPATIENT)
Dept: INTERNAL MEDICINE CLINIC | Facility: CLINIC | Age: 46
End: 2021-08-31
Payer: COMMERCIAL

## 2021-08-31 VITALS
HEART RATE: 88 BPM | HEIGHT: 66 IN | WEIGHT: 225 LBS | SYSTOLIC BLOOD PRESSURE: 138 MMHG | DIASTOLIC BLOOD PRESSURE: 80 MMHG | OXYGEN SATURATION: 99 % | BODY MASS INDEX: 36.16 KG/M2 | TEMPERATURE: 98.2 F

## 2021-08-31 DIAGNOSIS — I10 ESSENTIAL HYPERTENSION: ICD-10-CM

## 2021-08-31 DIAGNOSIS — E78.2 MIXED HYPERLIPIDEMIA: ICD-10-CM

## 2021-08-31 DIAGNOSIS — R73.9 HYPERGLYCEMIA: ICD-10-CM

## 2021-08-31 DIAGNOSIS — M32.9 SLE (SYSTEMIC LUPUS ERYTHEMATOSUS RELATED SYNDROME) (HCC): ICD-10-CM

## 2021-08-31 DIAGNOSIS — K21.9 GASTROESOPHAGEAL REFLUX DISEASE, UNSPECIFIED WHETHER ESOPHAGITIS PRESENT: ICD-10-CM

## 2021-08-31 DIAGNOSIS — D69.6 THROMBOCYTOPENIA (HCC): ICD-10-CM

## 2021-08-31 DIAGNOSIS — L98.9 SKIN LESION: ICD-10-CM

## 2021-08-31 DIAGNOSIS — E55.9 VITAMIN D DEFICIENCY: ICD-10-CM

## 2021-08-31 DIAGNOSIS — Z00.00 ANNUAL PHYSICAL EXAM: Primary | ICD-10-CM

## 2021-08-31 PROCEDURE — 3725F SCREEN DEPRESSION PERFORMED: CPT | Performed by: INTERNAL MEDICINE

## 2021-08-31 PROCEDURE — 99396 PREV VISIT EST AGE 40-64: CPT | Performed by: INTERNAL MEDICINE

## 2021-08-31 PROCEDURE — 99213 OFFICE O/P EST LOW 20 MIN: CPT | Performed by: INTERNAL MEDICINE

## 2021-08-31 PROCEDURE — 3008F BODY MASS INDEX DOCD: CPT | Performed by: INTERNAL MEDICINE

## 2021-08-31 NOTE — ASSESSMENT & PLAN NOTE
Stable  His platelet count was 559R couple years but now is 133K with a normal hemoglobin and WBC will observe and follow-up

## 2021-08-31 NOTE — PATIENT INSTRUCTIONS

## 2021-08-31 NOTE — PROGRESS NOTES
ADULT ANNUAL [address] INTERNAL MEDICINE    NAME: Reema Schwab  AGE: 48 y o  SEX: male  : 1975     DATE: 2021     Assessment and Plan:     Problem List Items Addressed This Visit     None      Visit Diagnoses     Annual physical exam    -  Primary          Immunizations and preventive care screenings were discussed with patient today  Appropriate education was printed on patient's after visit summary  Counseling:  · Alcohol/drug use: discussed moderation in alcohol intake, the recommendations for healthy alcohol use, and avoidance of illicit drug use  No follow-ups on file  Chief Complaint:     Chief Complaint   Patient presents with    Annual Exam     skin tags x 6 months       History of Present Illness:     Adult Annual Physical   Patient here for a comprehensive physical exam  The patient reports no problems  Diet and Physical Activity  · Diet/Nutrition: well balanced diet  · Exercise: walking  Depression Screening  PHQ-9 Depression Screening    PHQ-9:   Frequency of the following problems over the past two weeks:      Little interest or pleasure in doing things: 0 - not at all  Feeling down, depressed, or hopeless: 0 - not at all  PHQ-2 Score: 0       General Health  · Sleep: sleeps well  · Hearing: normal - bilateral   · Vision: wears glasses  · Dental: regular dental visits   Health  · Symptoms include: none     Review of Systems:     Review of Systems   Constitutional: Negative for chills and fever  HENT: Negative for congestion, ear pain, hearing loss, nosebleeds, sinus pain, sore throat and trouble swallowing  Eyes: Negative for discharge, redness and visual disturbance  Respiratory: Negative for cough, chest tightness and shortness of breath  Cardiovascular: Negative for chest pain and palpitations     Gastrointestinal: Negative for abdominal pain, blood in stool, constipation, diarrhea, nausea and vomiting  Genitourinary: Negative for dysuria, flank pain, frequency and hematuria  Musculoskeletal: Negative for arthralgias, myalgias and neck pain  Skin: Negative for color change and rash  Neurological: Negative for dizziness, speech difficulty, weakness and headaches  Hematological: Does not bruise/bleed easily  Psychiatric/Behavioral: Negative for agitation and behavioral problems           Past Medical History:     Past Medical History:   Diagnosis Date    Allergic rhinitis     Constipation     Elevated liver enzymes     hx of    Elevated liver enzymes     Essential hypertension 2/27/2021    Fatigue     Gastric polyps     history of    GE reflux     GE reflux 2/27/2021    GERD (gastroesophageal reflux disease)     Hyperglycemia 2/27/2021    Hypertension     [facility] ([facility] )     Mixed hyperlipidemia 2/27/2021    Obesity     Rosacea     Seizures ([facility] )     6year old sport injury only-no recurrance    Skin lesion     SLE (systemic lupus erythematosus related syndrome) ([facility] ) 2/27/2021    Systemic lupus erythematosus (HCC)     Thrombocytasthenia ([facility] )     Thrombocytopenia ([facility] ) 2/27/2021    Vitamin D deficiency     Vitamin D deficiency 2/27/2021      Past Surgical History:     Past Surgical History:   Procedure Laterality Date    COLONOSCOPY  01/12/2018    by Dr Emerald Lorenzo; needs f/u 1/2023     COLONOSCOPY      EGD        Family History:     Family History   Problem Relation Age of Onset    Dementia Mother     Colon cancer Father     Cancer Father         colon    Lung cancer Paternal Grandfather     Colon cancer Paternal Aunt     Skin cancer Paternal Uncle     Colon cancer Maternal Aunt       Social History:     Social History     Socioeconomic History    Marital status: /Civil Union     Spouse name: None    Number of children: None    Years of education: None    Highest education level: None   Occupational History    None   Tobacco Use    Smoking status: Light Tobacco Smoker     Types: Cigars    Smokeless tobacco: Never Used    Tobacco comment: once a month-cigar   Vaping Use    Vaping Use: Never used   Substance and Sexual Activity    Alcohol use: Yes     Comment: Occasional - As per [employer]-wine or beer    Drug use: Never    Sexual activity: None   Other Topics Concern    None   Social History Narrative    Annual eye exam: Advise    Annual dental checkup: Sees q6months    - As per [employer]     Social Determinants of Health     Financial Resource Strain:     Difficulty of Paying Living Expenses:    Food Insecurity:     Worried About Running Out of Food in the Last Year:     Ran Out of Food in the Last Year:    Transportation Needs:     Lack of Transportation (Medical):      Lack of Transportation (Non-Medical):    Physical Activity:     Days of Exercise per Week:     Minutes of Exercise per Session:    Stress:     Feeling of Stress :    Social Connections:     Frequency of Communication with Friends and Family:     Frequency of Social Gatherings with Friends and Family:     Attends [employer] Services:     Active Member of Clubs or Organizations:     Attends Club or Organization Meetings:     Marital Status:    Intimate Partner Violence:     Fear of Current or Ex-Partner:     Emotionally Abused:     Physically Abused:     Sexually Abused:       Current Medications:     Current Outpatient Medications   Medication Sig Dispense Refill    aspirin 81 mg chewable tablet Chew 81 mg daily      atenolol (TENORMIN) 50 mg tablet Take 1 tablet (50 mg total) by mouth daily 90 tablet 0    Cholecalciferol (Vitamin D3 Super Strength) 50 MCG (2000 UT) TABS Take by mouth      lisinopril (ZESTRIL) 40 mg tablet Take 1 tablet (40 mg total) by mouth daily 90 tablet 0    multivitamin (THERAGRAN) TABS Take 2 tablets by mouth daily      Vitamin D, Cholecalciferol, 50 MCG (2000 UT) CAPS Take 1 capsule by mouth daily       No current facility-administered medications for this visit  Allergies: Allergies   Allergen Reactions    Omeprazole GI Intolerance     He developed constipation      Physical Exam:     /80 (BP Location: Right arm, Patient Position: Sitting, Cuff Size: Adult)   Pulse 88   Temp 98 2 °F (36 8 °C) (Tympanic)   Ht 5' 6" (1 676 m)   Wt 102 kg (225 lb)   SpO2 99%   BMI 36 32 kg/m²     Physical Exam  Vitals and nursing note reviewed  Constitutional:       Appearance: He is well-developed  He is obese  HENT:      Head: Normocephalic and atraumatic  Right Ear: Tympanic membrane, ear canal and external ear normal       Left Ear: Tympanic membrane, ear canal and external ear normal       Mouth/Throat:      Comments: Patient has face mask on  Eyes:      General: No scleral icterus  Right eye: No discharge  Left eye: No discharge  Extraocular Movements: Extraocular movements intact  Conjunctiva/sclera: Conjunctivae normal    Cardiovascular:      Rate and Rhythm: Normal rate and regular rhythm  Pulses: Normal pulses  Heart sounds: No murmur heard  Pulmonary:      Effort: Pulmonary effort is normal  No respiratory distress  Breath sounds: Normal breath sounds  Abdominal:      General: Bowel sounds are normal       Palpations: Abdomen is soft  Tenderness: There is no abdominal tenderness  Musculoskeletal:         General: Normal range of motion  Cervical back: Normal range of motion and neck supple  Right lower leg: No edema  Left lower leg: No edema  Skin:     General: Skin is warm and dry  Neurological:      General: No focal deficit present  Mental Status: He is alert and oriented to person, place, and time     Psychiatric:         Mood and Affect: Mood normal          Behavior: Behavior normal           Sathish Mederos MD  [address] [place] [address]

## 2021-08-31 NOTE — PROGRESS NOTES
Assessment/Plan:    1  Annual physical exam  Assessment & Plan:  Had a colonoscopy 2018  Needs follow-up in 2023       2  Essential hypertension  Assessment & Plan:  Blood pressure well controlled  Repeat blood pressure was 128/80  Advised to continue present medications and low-salt diet  3  Mixed hyperlipidemia  Assessment & Plan:  Cholesterol increased from 146-150 to triglyceride increased from 137-205 HDL 30 LDL 97  Advised for low-cholesterol, low saturated low carbs diet  Advised to exercise lose weight  4  Hyperglycemia  Assessment & Plan:  Fasting blood sugar 110 advised for low sugar low carbs diet  His hemoglobin A1c was 5 2 in  2019        5  Vitamin D deficiency  Assessment & Plan:  Vitamin-D deficiency resolved after being on vitamin-D supplement  Vitamin-D level was 37  Advised to continue vitamin-D daily  6  Thrombocytopenia ([facility] )  Assessment & Plan:  Stable  His platelet count was 378N couple years but now is 133K with a normal hemoglobin and WBC will observe and follow-up  7  SLE (systemic lupus erythematosus related syndrome) ([facility] )  Assessment & Plan:  Patient has a rash on face otherwise asymptomatic  8  BMI 36 0-36 9,adult    9  Gastroesophageal reflux disease, unspecified whether esophagitis present  Assessment & Plan:  Recently  had a EGD April 2021  Revealed gastric polyps and distal esophageal erythema and superficial ulceration  Was advised pantoprazole 40 mg once aday but  patient states he is not taking just watching diet closely  He was advised follow-up EGD in 1 year      10  Skin lesion  Assessment & Plan:  Has a multiple skin tags and/or seborrheic keratosis type of lesion in bilateral axilla, left groin, right supraclavicular area, and very tiny lesions on face  Will refer to dermatologist     Orders:  -     Ambulatory referral to Dermatology; Future    BMI Counseling: Body mass index is 36 32 kg/m²   The BMI is above normal  Nutrition recommendations include decreasing portion sizes, decreasing fast food intake, consuming healthier snacks, limiting drinks that contain sugar, moderation in carbohydrate intake, reducing intake of saturated and trans fat and reducing intake of cholesterol  Exercise recommendations include exercising 3-5 times per week and strength training exercises  No pharmacotherapy was ordered  Subjective:  Patient presents for annual wellness exam as well as follow-up of his medical problems  Patient ID: Wild Gastelum is a 48 y o  male  HPI   43-year-old white male patient presents for annual wellness exam as well as follow-up of his medical problems  Denies any chest pain, shortness of breath, pain in abdomen  Denies any cough, fever, chills denies any nausea, vomiting, diarrhea got his COVID-19 vaccination last dose was May 2021 complain of skin lesions on face, bilateral axilla, left groin    The following portions of the patient's history were reviewed and updated as appropriate:     Past Medical History:  He has a past medical history of Allergic rhinitis, BMI 36 0-36 9,adult (8/31/2021), Constipation, Elevated liver enzymes, Elevated liver enzymes, Essential hypertension (2/27/2021), Fatigue, Gastric polyps, GE reflux, GE reflux (2/27/2021), GERD (gastroesophageal reflux disease), Hyperglycemia (2/27/2021), Hypertension, Lupus ([facility] ), Mixed hyperlipidemia (2/27/2021), Obesity, Rosacea, Seizures ([facility] ), Skin lesion, Skin lesion (8/31/2021), SLE (systemic lupus erythematosus related syndrome) ([place] [address] ) (2/27/2021), Systemic lupus erythematosus ([place] [address] ), Thrombocytasthenia ([facility] ), Thrombocytopenia ([facility] ) (2/27/2021), Vitamin D deficiency, and Vitamin D deficiency (2/27/2021)  ,  _______________________________________________________________________  Past Surgical History:   has a past surgical history that includes Colonoscopy (01/12/2018);  Colonoscopy; and EGD ,  _______________________________________________________________________  Family History:  family history includes Cancer in his father; Colon cancer in his father, maternal aunt, and paternal aunt; Dementia in his mother; Lung cancer in his paternal grandfather; Skin cancer in his paternal uncle ,  _______________________________________________________________________  Social History:   reports that he has been smoking cigars  He has never used smokeless tobacco  He reports current alcohol use  He reports that he does not use drugs  ,  _______________________________________________________________________  Allergies:  is allergic to omeprazole     _______________________________________________________________________  Current Outpatient Medications   Medication Sig Dispense Refill    aspirin 81 mg chewable tablet Chew 81 mg daily      atenolol (TENORMIN) 50 mg tablet Take 1 tablet (50 mg total) by mouth daily 90 tablet 0    lisinopril (ZESTRIL) 40 mg tablet Take 1 tablet (40 mg total) by mouth daily 90 tablet 0    multivitamin (THERAGRAN) TABS Take 2 tablets by mouth daily      Vitamin D, Cholecalciferol, 50 MCG (2000 UT) CAPS Take 1 capsule by mouth daily       No current facility-administered medications for this visit      _______________________________________________________________________  Review of Systems   Constitutional: Negative for chills and fever  HENT: Negative for congestion, ear pain, hearing loss, nosebleeds, sinus pain, sore throat and trouble swallowing  Eyes: Negative for discharge, redness and visual disturbance  Respiratory: Negative for cough, chest tightness and shortness of breath  Cardiovascular: Negative for chest pain and palpitations  Gastrointestinal: Negative for abdominal pain, blood in stool, constipation, diarrhea, nausea and vomiting  Genitourinary: Negative for dysuria, flank pain, frequency and hematuria     Musculoskeletal: Negative for arthralgias, myalgias and neck pain  Skin: Negative for color change and rash  Neurological: Negative for dizziness, speech difficulty, weakness and headaches  Hematological: Does not bruise/bleed easily  Psychiatric/Behavioral: Negative for agitation and behavioral problems  Objective:  Vitals:    08/31/21 1627   BP: 138/80   BP Location: Right arm   Patient Position: Sitting   Cuff Size: Adult   Pulse: 88   Temp: 98 2 °F (36 8 °C)   TempSrc: Tympanic   SpO2: 99%   Weight: 102 kg (225 lb)   Height: 5' 6" (1 676 m)     Body mass index is 36 32 kg/m²  Physical Exam  Vitals and nursing note reviewed  Constitutional:       General: He is not in acute distress  Appearance: Normal appearance  He is obese  HENT:      Head: Normocephalic and atraumatic  Right Ear: Tympanic membrane, ear canal and external ear normal       Left Ear: Tympanic membrane, ear canal and external ear normal       Mouth/Throat:      Comments: Patient has a face mask on  Eyes:      General: No scleral icterus  Right eye: No discharge  Left eye: No discharge  Extraocular Movements: Extraocular movements intact  Conjunctiva/sclera: Conjunctivae normal    Cardiovascular:      Rate and Rhythm: Normal rate and regular rhythm  Pulses: Normal pulses  Heart sounds: No murmur heard  Pulmonary:      Effort: Pulmonary effort is normal       Breath sounds: Normal breath sounds  Abdominal:      General: Bowel sounds are normal       Palpations: Abdomen is soft  Tenderness: There is no abdominal tenderness  Musculoskeletal:         General: Normal range of motion  Cervical back: Normal range of motion and neck supple  No muscular tenderness  Right lower leg: No edema  Left lower leg: No edema  Skin:     General: Skin is warm  Findings: Lesion (Has a small raised slightly dark skin lesion on right supraclavicular area, bilateral axilla,, left groin ) present  No rash  Neurological:      General: No focal deficit present  Mental Status: He is alert and oriented to person, place, and time     Psychiatric:         Mood and Affect: Mood normal          Behavior: Behavior normal

## 2021-08-31 NOTE — ASSESSMENT & PLAN NOTE
Next appt nothing scheduled  Last appt 10/26/17    Refill request for  Famotidine 20 mg 1 tablet twice daily  Last refilled info;  12/26/17 #60 with 1 refill  Refill unable to be completed per standing protocol due to; no future appointment  Orders pended, and routed to provider for approval.    Last office note from Dr. Monahan states:  Plan:  -At this time we will try treating the patient's reflux with famotidine twice daily before repeating the CT scan in November  -If the patient's CT scan in November shows worsening infiltrates or progression of disease we can pursue bronchoscopy. If the cultures are improving we'll hold off and continue treatment for reflux.  -We will also pursue an in lab, split-night polysomnography given the patient's worsening nocturnal reflux, snoring, and cardiac history. Untreated sleep-disordered breathing can contribute to worsening nocturnal reflux and worsening of her symptoms.     Return to clinic after the sleep study. If the patient's CT scan in November shows marked progression will bring the patient back early and discuss bronchoscopy    The patient did have the CT completed in November (this was ordered by Dr. Salvador so no recommendations were made by Dr. Monahan) but has not completed the sleep study.   Vitamin-D deficiency resolved after being on vitamin-D supplement  Vitamin-D level was 37  Advised to continue vitamin-D daily

## 2021-08-31 NOTE — ASSESSMENT & PLAN NOTE
Blood pressure well controlled  Repeat blood pressure was 128/80  Advised to continue present medications and low-salt diet

## 2021-08-31 NOTE — ASSESSMENT & PLAN NOTE
Has a multiple skin tags and/or seborrheic keratosis type of lesion in bilateral axilla, left groin, right supraclavicular area, and very tiny lesions on face    Will refer to dermatologist

## 2021-08-31 NOTE — ASSESSMENT & PLAN NOTE
Recently  had a EGD April 2021  Revealed gastric polyps and distal esophageal erythema and superficial ulceration  Was advised pantoprazole 40 mg once aday but  patient states he is not taking just watching diet closely    He was advised follow-up EGD in 1 year

## 2021-08-31 NOTE — ASSESSMENT & PLAN NOTE
Cholesterol increased from 146-150 to triglyceride increased from 137-205 HDL 30 LDL 97  Advised for low-cholesterol, low saturated low carbs diet  Advised to exercise lose weight

## 2021-10-01 DIAGNOSIS — I10 ESSENTIAL HYPERTENSION: ICD-10-CM

## 2021-10-01 RX ORDER — ATENOLOL 50 MG/1
50 TABLET ORAL DAILY
Qty: 90 TABLET | Refills: 0 | Status: SHIPPED | OUTPATIENT
Start: 2021-10-01 | End: 2021-12-28 | Stop reason: SDUPTHER

## 2021-11-16 ENCOUNTER — TELEPHONE (OUTPATIENT)
Dept: INTERNAL MEDICINE CLINIC | Facility: CLINIC | Age: 46
End: 2021-11-16

## 2021-11-16 DIAGNOSIS — I10 ESSENTIAL HYPERTENSION: ICD-10-CM

## 2021-11-16 RX ORDER — LISINOPRIL 40 MG/1
40 TABLET ORAL DAILY
Qty: 90 TABLET | Refills: 1 | Status: SHIPPED | OUTPATIENT
Start: 2021-11-16 | End: 2022-05-17 | Stop reason: SDUPTHER

## 2021-12-03 ENCOUNTER — OFFICE VISIT (OUTPATIENT)
Dept: INTERNAL MEDICINE CLINIC | Facility: CLINIC | Age: 46
End: 2021-12-03
Payer: COMMERCIAL

## 2021-12-03 VITALS
TEMPERATURE: 98.4 F | BODY MASS INDEX: 35.83 KG/M2 | OXYGEN SATURATION: 97 % | DIASTOLIC BLOOD PRESSURE: 82 MMHG | WEIGHT: 222 LBS | HEART RATE: 90 BPM | SYSTOLIC BLOOD PRESSURE: 126 MMHG

## 2021-12-03 DIAGNOSIS — M32.9 SLE (SYSTEMIC LUPUS ERYTHEMATOSUS RELATED SYNDROME) (HCC): ICD-10-CM

## 2021-12-03 DIAGNOSIS — E55.9 VITAMIN D DEFICIENCY: ICD-10-CM

## 2021-12-03 DIAGNOSIS — K21.00 GASTROESOPHAGEAL REFLUX DISEASE WITH ESOPHAGITIS WITHOUT HEMORRHAGE: ICD-10-CM

## 2021-12-03 DIAGNOSIS — R73.9 HYPERGLYCEMIA: ICD-10-CM

## 2021-12-03 DIAGNOSIS — E78.2 MIXED HYPERLIPIDEMIA: ICD-10-CM

## 2021-12-03 DIAGNOSIS — D69.6 THROMBOCYTOPENIA (HCC): ICD-10-CM

## 2021-12-03 DIAGNOSIS — I10 ESSENTIAL HYPERTENSION: Primary | ICD-10-CM

## 2021-12-03 DIAGNOSIS — K63.5 POLYP OF COLON, UNSPECIFIED PART OF COLON, UNSPECIFIED TYPE: ICD-10-CM

## 2021-12-03 PROBLEM — K21.9 GE REFLUX: Status: RESOLVED | Noted: 2021-02-27 | Resolved: 2021-12-03

## 2021-12-03 PROCEDURE — 99214 OFFICE O/P EST MOD 30 MIN: CPT | Performed by: INTERNAL MEDICINE

## 2021-12-03 RX ORDER — FAMOTIDINE 20 MG/1
20 TABLET, FILM COATED ORAL 2 TIMES DAILY
Qty: 60 TABLET | Refills: 3 | Status: SHIPPED | OUTPATIENT
Start: 2021-12-03 | End: 2022-07-18 | Stop reason: SDUPTHER

## 2021-12-18 ENCOUNTER — IMMUNIZATIONS (OUTPATIENT)
Dept: FAMILY MEDICINE CLINIC | Facility: HOSPITAL | Age: 46
End: 2021-12-18

## 2021-12-18 DIAGNOSIS — Z23 ENCOUNTER FOR IMMUNIZATION: Primary | ICD-10-CM

## 2021-12-18 PROCEDURE — 91300 COVID-19 PFIZER VACC 0.3 ML: CPT

## 2021-12-18 PROCEDURE — 0001A COVID-19 PFIZER VACC 0.3 ML: CPT

## 2021-12-28 ENCOUNTER — TELEPHONE (OUTPATIENT)
Dept: INTERNAL MEDICINE CLINIC | Facility: CLINIC | Age: 46
End: 2021-12-28

## 2021-12-28 DIAGNOSIS — I10 ESSENTIAL HYPERTENSION: ICD-10-CM

## 2021-12-28 RX ORDER — ATENOLOL 50 MG/1
50 TABLET ORAL DAILY
Qty: 90 TABLET | Refills: 1 | Status: SHIPPED | OUTPATIENT
Start: 2021-12-28 | End: 2022-06-29 | Stop reason: SDUPTHER

## 2022-01-20 ENCOUNTER — TELEPHONE (OUTPATIENT)
Dept: INTERNAL MEDICINE CLINIC | Facility: CLINIC | Age: 47
End: 2022-01-20

## 2022-01-20 DIAGNOSIS — R50.9 FEVER AND CHILLS: ICD-10-CM

## 2022-01-20 DIAGNOSIS — J02.9 SORE THROAT: Primary | ICD-10-CM

## 2022-01-20 DIAGNOSIS — Z20.822 EXPOSURE TO COVID-19 VIRUS: ICD-10-CM

## 2022-01-20 PROCEDURE — U0003 INFECTIOUS AGENT DETECTION BY NUCLEIC ACID (DNA OR RNA); SEVERE ACUTE RESPIRATORY SYNDROME CORONAVIRUS 2 (SARS-COV-2) (CORONAVIRUS DISEASE [COVID-19]), AMPLIFIED PROBE TECHNIQUE, MAKING USE OF HIGH THROUGHPUT TECHNOLOGIES AS DESCRIBED BY CMS-2020-01-R: HCPCS | Performed by: INTERNAL MEDICINE

## 2022-01-20 PROCEDURE — U0005 INFEC AGEN DETEC AMPLI PROBE: HCPCS | Performed by: INTERNAL MEDICINE

## 2022-01-20 NOTE — TELEPHONE ENCOUNTER
Wife is positive for covid - Tuesday night he started with symp - Sore Throat, congestion, low grade fever and chills      He is vaccinated - do you want to order a test?

## 2022-02-03 ENCOUNTER — CONSULT (OUTPATIENT)
Dept: DERMATOLOGY | Facility: CLINIC | Age: 47
End: 2022-02-03
Payer: COMMERCIAL

## 2022-02-03 VITALS — TEMPERATURE: 98.1 F | BODY MASS INDEX: 33.95 KG/M2 | HEIGHT: 68 IN | WEIGHT: 224 LBS

## 2022-02-03 DIAGNOSIS — L91.8 ACROCHORDON: ICD-10-CM

## 2022-02-03 DIAGNOSIS — L82.1 SEBORRHEIC KERATOSIS: ICD-10-CM

## 2022-02-03 DIAGNOSIS — L71.9 ROSACEA: Primary | ICD-10-CM

## 2022-02-03 DIAGNOSIS — Z86.2 HISTORY OF LUPUS ANTICOAGULANT DISORDER: ICD-10-CM

## 2022-02-03 DIAGNOSIS — L98.9 SKIN LESION: ICD-10-CM

## 2022-02-03 PROCEDURE — 99214 OFFICE O/P EST MOD 30 MIN: CPT | Performed by: DERMATOLOGY

## 2022-02-03 PROCEDURE — 3008F BODY MASS INDEX DOCD: CPT | Performed by: DERMATOLOGY

## 2022-02-03 RX ORDER — METRONIDAZOLE 7.5 MG/G
GEL TOPICAL
Qty: 45 G | Refills: 4 | Status: SHIPPED | OUTPATIENT
Start: 2022-02-03

## 2022-02-03 NOTE — PATIENT INSTRUCTIONS
ACROCHORDON ("SKIN TAG")    Assessment and Plan:  Based on a thorough discussion of this condition and the management approach to it (including a comprehensive discussion of the known risks, side effects and potential benefits of treatment), the patient (family) agrees to implement the following specific plan:   Reassured benign   Can consider removal in future if gets bothersome; not covered under insurance; considered cosmetic; charge could be up to $150 or more    Skin tags are common, soft, harmless skin lesions that are also called, in the appropriate settings, papillomas, fibroepithelial polyps, and soft fibromas  They are made up of loosely arranged collagen fibers and blood vessels surrounded by a thickened or thinned-out epidermis  Skin tags tend to develop in both men and women as we grow older  They are usually found on the skin folds (neck, armpits, groin)  It is not known what specifically causes skin tags  Certain factors, though, do appear to play a role:   Chaffing and irritation from skin rubbing together   High levels of growth factors (as seen, for example, in pregnancy or in acromegaly/gigantism)   Insulin resistance   Human papillomavirus (wart virus)    We discussed that most skin tags do not need to be treated unless they are specifically causing the patient physical distress or limitation or pose a risk for a larger problem such as an infection that forms secondary to excoriation or chronic irritation      We had a thorough discussion of treatment options and specific risks (including that any procedural treatment may not be covered by insurance and would then be the patient's responsibility) and benefits/alternatives including but not limited to the following:   Cryotherapy (freezing)   Shave removal   Surgical excision (snip excision with scissors)   Electrosurgery   Ligation (we do not do this procedure and counseled against it due to risk of tissue necrosis and infection)    ROSACEA    Assessment and Plan:  Based on a thorough discussion of this condition and the management approach to it (including a comprehensive discussion of the known risks, side effects and potential benefits of treatment), the patient (family) agrees to implement the following specific plan:   Apply Metronidazole 1 % gel once daily to face   Apply sunscreen while outside 50 + SPF     Rosacea is a chronic rash affecting the mid-face including the nose, cheeks, chin, forehead, and eyelids  The incidence is usually greatest between the ages of 30-60 years and is more common in people with fair skin  Common characteristics include redness, telangiectasias, papules and pustules over affected areas  Rosacea may look similar to acne, but there is a lack of comedones  Occasionally the eyes may also be involved in ocular rosacea  In advanced disease, enlargement of the sebaceous glands in the nose, termed rhinophyma, may be present  Rosacea results in red spots (papules) and sometimes pustules over the face, but unlike acne there are no blackheads, whiteheads, or cystic nodules  Patients often experience increased facial flushing with prominent blood vessels (erythematotelangiectatic rosacea) and dry, sensitive skin  These symptoms are exacerbated by sun exposure, hot or spicy foods, topical steroids and oil-based facial products  In ocular rosacea, eyelids may be red and sore due to conjunctivitis, keratitis, and episcleritis  If rhinophyma develops due to enlargement of sebaceous glands, the patient may have an enlarged and irregularly shaped nose with prominent pores  In rosacea that is refractory to treatment, patients can develop persistent redness and swelling of the face due to lymphatic obstruction (Morbihan disease)  Distribution around the cheeks may be confused with the malar or butterfly rash of lupus   However, the rash of lupus spares the nasal creases and lacks papules and pustules  If signs of photosensitivity, oral ulcers, arthritis, and kidney dysfunction are present then consider referral to a rheumatologist      There are many potential causes of rosacea including genetic, environmental, vascular, and inflammatory factors  These include, but are not limited to:   Chronic exposure to ultraviolet radiation    Increased immune responses in the form of cathelicidins that promote vessel dilation and infiltration with white blood cells (neutrophils) into the dermis   Increased matrix metalloproteinases such as collagen and elastase that remodel normal tissue may contribute to inflammation of the skin making it thicker and harder   There is some evidence to suggest that increased numbers of demodex mites on patient skin may contribute to rosacea papules     General Treatment Approach    Avoid exacerbating factors such as heat, spicy foods, and alcohol    Use daily SPF30+ sunscreen and other methods of coverage for sun protection   Use water-based make-up    Avoid applying topical steroids to affected areas as they can cause perioral dermatitis and exacerbate rosacea     Topical Treatment Approach   Metronidazole cream or gel by itself or in combination with oral antibiotics for more severe cases   Azelaic acid cream or lotion is effective for mild inflammatory rosacea when applied twice daily to affected areas   Brimonidine gel and oxymetazoline hydrochloride cream can reduce facial redness temporarily    Ivermectin cream can treat papulopustular rosacea by controlling demodex mites and inflammation    Pimecrolimus cream or tacrolimus ointment twice a day for 2-3 months can help reduce inflammation    Oral Treatment Approach   Antibiotics such as doxycycline, minocycline, or erythromycin for 1-3 months   Clonidine and carvedilol can help reduce facial flushing and are generally well tolerated   Common side effects include low blood pressure, gastrointestinal upset, dry eyes, blurred vision and low heart rate   Isotretinoin at low doses can be effective for long term treatment when antibiotics fail  Side effects may make it unsuitable for some patients   NSAIDs such as diclofenac can help reduce discomfort and redness in the skin  Procedural/Surgical Treatment Approach    Vascular lasers or intense pulsed light treatment may be used to treat persistent telangiectasia and papulopustular rosacea   Plastic surgery and carbon dioxide lasers may be used to treat rhinophyma     HISTORY OF LUPUS    Assessment and Plan:  Based on a thorough discussion of this condition and the management approach to it (including a comprehensive discussion of the known risks, side effects and potential benefits of treatment), the patient (family) agrees to implement the following specific plan:    SEBORRHEIC KERATOSIS; NON-INFLAMED    Assessment and Plan:  Based on a thorough discussion of this condition and the management approach to it (including a comprehensive discussion of the known risks, side effects and potential benefits of treatment), the patient (family) agrees to implement the following specific plan:   Reassured benign    Seborrheic Keratosis  A seborrheic keratosis is a harmless warty spot that appears during adult life as a common sign of skin aging  Seborrheic keratoses can arise on any area of skin, covered or uncovered, with the usual exception of the palms and soles  They do not arise from mucous membranes  Seborrheic keratoses can have highly variable appearance  Seborrheic keratoses are extremely common  It has been estimated that over 90% of adults over the age of 61 years have one or more of them  They occur in males and females of all races, typically beginning to erupt in the 35s or 45s  They are uncommon under the age of 21 years  The precise cause of seborrhoeic keratoses is not known  Seborrhoeic keratoses are considered degenerative in nature   As time goes by, seborrheic keratoses tend to become more numerous  Some people inherit a tendency to develop a very large number of them; some people may have hundreds of them  The name "seborrheic keratosis" is misleading, because these lesions are not limited to a seborrhoeic distribution (scalp, mid-face, chest, upper back), nor are they formed from sebaceous glands, nor are they associated with sebum -- which is greasy  Seborrheic keratosis may also be called "SK," "Seb K," "basal cell papilloma," "senile wart," or "barnacle "      Researchers have noted:   Eruptive seborrhoeic keratoses can follow sunburn or dermatitis   Skin friction may be the reason they appear in body folds   Viral cause (e g , human papillomavirus) seems unlikely   Stable and clonal mutations or activation of FRFR3, PIK3CA, TL, AKT1 and EGFR genes are found in seborrhoeic keratoses   Seborrhoeic keratosis can arise from solar lentigo   FRFR3 mutations also arise in solar lentigines  These mutations are associated with increased age and location on the head and neck, suggesting a role of ultraviolet radiation in these lesions   Seborrheic keratoses do not harbour tumour suppressor gene mutations   Epidermal growth factor receptor inhibitors, which are used to treat some cancers, often result in an increase in verrucal (warty) keratoses  There is no easy way to remove multiple lesions on a single occasion  Unless a specific lesion is "inflamed" and is causing pain or stinging/burning or is bleeding, most insurance companies do not authorize treatment

## 2022-02-03 NOTE — PROGRESS NOTES
[facility] Note     Patient Name: Santiago Law  Encounter Date: 02/03/22     Have you been cared for by a [facility] in the last 3 years and, if so, which one? No    · Have you traveled outside of the [address] [place] [address] in the past 3 months or outside of the [facility] area in the last 2 weeks? No     May we call your Preferred Phone number to discuss your specific medical information? Yes     May we leave a detailed message that includes your specific medical information? Yes      Today's Chief Concerns:   Concern #1:  Skin tags   Concern #2:  Growth on neck    Past Medical History:  Have you personally ever had or currently have any of the following? · Skin cancer (such as Melanoma, Basal Cell Carcinoma, Squamous Cell Carcinoma? (If Yes, please provide more detail)- No  · Eczema: No  · Psoriasis: No  · HIV/AIDS: No  · Hepatitis B or C: No  · Tuberculosis: No  · Systemic Immunosuppression such as Diabetes, Biologic or Immunotherapy, Chemotherapy, Organ Transplantation, Bone Marrow Transplantation (If YES, please provide more detail): No  · Radiation Treatment (If YES, please provide more detail): No  · Any other major medical conditions/concerns? (If Yes, which types)- YES, lupus    Social History:     What is/was your primary occupation?      What are your hobbies/past-times? Family History:  Have any of your "first degree relatives" (parent, brother, sister, or child) had any of the following       · Skin cancer such as Melanoma or Merkel Cell Carcinoma or Pancreatic Cancer? YES, paternal uncle has hx of skin cancer  · Eczema, Asthma, Hay Fever or Seasonal Allergies: No  · Psoriasis or Psoriatic Arthritis: No  · Do any other medical conditions seem to run in your family? If Yes, what condition and which relatives?   YES, father has hx of cancer    Current Medications:         Current Outpatient Medications:     aspirin 81 mg chewable tablet, Chew 81 mg daily, Disp: , Rfl:     atenolol (TENORMIN) 50 mg tablet, Take 1 tablet (50 mg total) by mouth daily, Disp: 90 tablet, Rfl: 1    famotidine (PEPCID) 20 mg tablet, Take 1 tablet (20 mg total) by mouth 2 (two) times a day, Disp: 60 tablet, Rfl: 3    lisinopril (ZESTRIL) 40 mg tablet, Take 1 tablet (40 mg total) by mouth daily, Disp: 90 tablet, Rfl: 1    multivitamin (THERAGRAN) TABS, Take 2 tablets by mouth daily, Disp: , Rfl:     Vitamin D, Cholecalciferol, 50 MCG (2000 UT) CAPS, Take 1 capsule by mouth daily, Disp: , Rfl:       Review of Systems:  Have you recently had or currently have any of the following? If YES, what are you doing for the problem? · Fever, chills or unintended weight loss: No  · Sudden loss or change in your vision: No  · Nausea, vomiting or blood in your stool: No  · Painful or swollen joints: No  · Wheezing or cough: No  · Changing mole or non-healing wound: No  · Nosebleeds: YES, dry air  · Excessive sweating: No  · Easy or prolonged bleeding? No  · Over the last 2 weeks, how often have you been bothered by the following problems? · Taking little interest or pleasure in doing things: 1 - Not at All  · Feeling down, depressed, or hopeless: 1 - Not at All  Rapid heartbeat with epinephrine:  No    · Any known allergies? Allergies   Allergen Reactions    Omeprazole GI Intolerance     He developed constipation   ·       Physical Exam:     Was a chaperone (Derm Clinical Assistant) present throughout the entire Physical Exam? Yes     Did the Dermatology Team specifically  the patient on the importance of a Full Skin Exam to be sure that nothing is missed clinically?  Yes}  o Did the patient ultimately request or accept a Full Skin Exam?  NO  o Did the patient specifically refuse to have the areas "under-the-bra" examined by the Dermatologist? No  o Did the patient specifically refuse to have the areas "under-the-underwear" examined by the Dermatologist? No    CONSTITUTIONAL:   Vitals:    02/03/22 1610   Temp: 98 1 °F (36 7 °C)   TempSrc: Temporal   Weight: 102 kg (224 lb)   Height: 5' 8" (1 727 m)     PSYCH: Normal mood and affect  EYES: Normal conjunctiva  ENT: Normal lips and oral mucosa  CARDIOVASCULAR: No edema  RESPIRATORY: Normal respirations  HEME/LYMPH/IMMUNO:  No regional lymphadenopathy except as noted below in "ASSESSMENT AND PLAN BY DIAGNOSIS"    SKIN:  FULL ORGAN SYSTEM EXAM  Hair, Scalp, Ears, Face Normal except as noted below in Assessment   Neck, Cervical Chain Nodes Normal except as noted below in Assessment   Right Arm/Hand/Fingers Normal except as noted below in Assessment   Left Arm/Hand/Fingers Normal except as noted below in Assessment   Chest/Breasts/Axillae Viewed areas Normal except as noted below in Assessment   Abdomen, Umbilicus Normal except as noted below in Assessment   Back/Spine Normal except as noted below in Assessment   Groin/Genitalia/Buttocks NOT EXAMINED   Right Leg, Foot, Toes Normal except as noted below in Assessment   Left Leg, Foot, Toes Normal except as noted below in Assessment        Assessment and Plan by Diagnosis:    History of Present Condition:     Duration:  How long has this been an issue for you?    o  1-2 years, spot on neck last 8 months, left testicle-5 months ago   Location Affected:  Where on the body is this affecting you?    o  neck, right arm pit, left testicle   Quality:  Is there any bleeding, pain, itch, burning/irritation, or redness associated with the skin lesion? o  itches   Severity:  Describe any bleeding, pain, itch, burning/irritation, or redness on a scale of 1 to 10 (with 10 being the worst)  o  2   Timing:  Does this condition seem to be there pretty constantly or do you notice it more at specific times throughout the day?     o  constant   Context:  Have you ever noticed that this condition seems to be associated with specific activities you do?    o denies   Modifying Factors:    o Anything that seems to make the condition worse? -  Stevie roque What have you tried to do to make the condition better?    -  denies   Associated Signs and Symptoms:  Does this skin lesion seem to be associated with any of the following:  o  SL AMB DERM SIGNS AND SYMPTOMS: Itching and Scratching   ACROCHORDON ("SKIN TAG")    Physical Exam:   Anatomic Location Affected:  Right arm pit, neck, left testicle   Morphological Description:  Skin colored tag   Pertinent Positives:   Pertinent Negatives: Additional History of Present Condition:  Patient noticed about 5 months ago    Assessment and Plan:  Based on a thorough discussion of this condition and the management approach to it (including a comprehensive discussion of the known risks, side effects and potential benefits of treatment), the patient (family) agrees to implement the following specific plan:   Reassured benign   Can consider removal in future if gets bothersome; not covered under insurance; considered cosmetic; charge could be up to $150 or more    Skin tags are common, soft, harmless skin lesions that are also called, in the appropriate settings, papillomas, fibroepithelial polyps, and soft fibromas  They are made up of loosely arranged collagen fibers and blood vessels surrounded by a thickened or thinned-out epidermis  Skin tags tend to develop in both men and women as we grow older  They are usually found on the skin folds (neck, armpits, groin)  It is not known what specifically causes skin tags    Certain factors, though, do appear to play a role:   Chaffing and irritation from skin rubbing together   High levels of growth factors (as seen, for example, in pregnancy or in acromegaly/gigantism)   Insulin resistance   Human papillomavirus (wart virus)    We discussed that most skin tags do not need to be treated unless they are specifically causing the patient physical distress or limitation or pose a risk for a larger problem such as an infection that forms secondary to excoriation or chronic irritation  We had a thorough discussion of treatment options and specific risks (including that any procedural treatment may not be covered by insurance and would then be the patient's responsibility) and benefits/alternatives including but not limited to the following:   Cryotherapy (freezing)   Shave removal   Surgical excision (snip excision with scissors)   Electrosurgery   Ligation (we do not do this procedure and counseled against it due to risk of tissue necrosis and infection)    ROSACEA    Physical Exam:   Anatomic Location Affected:  Forehead; cheeks   Morphological Description:  Erythematous patches, rhino phymatous changes   Pertinent Positives:   Pertinent Negatives: Additional History of Present Condition:  Discovered upon skin exam;     Assessment and Plan:  Based on a thorough discussion of this condition and the management approach to it (including a comprehensive discussion of the known risks, side effects and potential benefits of treatment), the patient (family) agrees to implement the following specific plan:   Apply Metronidazole 1 % gel once daily to face   Apply sunscreen while outside 50 + SPF     Rosacea is a chronic rash affecting the mid-face including the nose, cheeks, chin, forehead, and eyelids  The incidence is usually greatest between the ages of 30-60 years and is more common in people with fair skin  Common characteristics include redness, telangiectasias, papules and pustules over affected areas  Rosacea may look similar to acne, but there is a lack of comedones  Occasionally the eyes may also be involved in ocular rosacea  In advanced disease, enlargement of the sebaceous glands in the nose, termed rhinophyma, may be present       Rosacea results in red spots (papules) and sometimes pustules over the face, but unlike acne there are no blackheads, whiteheads, or cystic nodules  Patients often experience increased facial flushing with prominent blood vessels (erythematotelangiectatic rosacea) and dry, sensitive skin  These symptoms are exacerbated by sun exposure, hot or spicy foods, topical steroids and oil-based facial products  In ocular rosacea, eyelids may be red and sore due to conjunctivitis, keratitis, and episcleritis  If rhinophyma develops due to enlargement of sebaceous glands, the patient may have an enlarged and irregularly shaped nose with prominent pores  In rosacea that is refractory to treatment, patients can develop persistent redness and swelling of the face due to lymphatic obstruction (Morbihan disease)  Distribution around the cheeks may be confused with the malar or butterfly rash of lupus  However, the rash of lupus spares the nasal creases and lacks papules and pustules  If signs of photosensitivity, oral ulcers, arthritis, and kidney dysfunction are present then consider referral to a rheumatologist      There are many potential causes of rosacea including genetic, environmental, vascular, and inflammatory factors   These include, but are not limited to:   Chronic exposure to ultraviolet radiation    Increased immune responses in the form of cathelicidins that promote vessel dilation and infiltration with white blood cells (neutrophils) into the dermis   Increased matrix metalloproteinases such as collagen and elastase that remodel normal tissue may contribute to inflammation of the skin making it thicker and harder   There is some evidence to suggest that increased numbers of demodex mites on patient skin may contribute to rosacea papules     General Treatment Approach    Avoid exacerbating factors such as heat, spicy foods, and alcohol    Use daily SPF30+ sunscreen and other methods of coverage for sun protection   Use water-based make-up    Avoid applying topical steroids to affected areas as they can cause perioral dermatitis and exacerbate rosacea     Topical Treatment Approach   Metronidazole cream or gel by itself or in combination with oral antibiotics for more severe cases   Azelaic acid cream or lotion is effective for mild inflammatory rosacea when applied twice daily to affected areas   Brimonidine gel and oxymetazoline hydrochloride cream can reduce facial redness temporarily    Ivermectin cream can treat papulopustular rosacea by controlling demodex mites and inflammation    Pimecrolimus cream or tacrolimus ointment twice a day for 2-3 months can help reduce inflammation    Oral Treatment Approach   Antibiotics such as doxycycline, minocycline, or erythromycin for 1-3 months   Clonidine and carvedilol can help reduce facial flushing and are generally well tolerated  Common side effects include low blood pressure, gastrointestinal upset, dry eyes, blurred vision and low heart rate   Isotretinoin at low doses can be effective for long term treatment when antibiotics fail  Side effects may make it unsuitable for some patients   NSAIDs such as diclofenac can help reduce discomfort and redness in the skin  Procedural/Surgical Treatment Approach    Vascular lasers or intense pulsed light treatment may be used to treat persistent telangiectasia and papulopustular rosacea   Plastic surgery and carbon dioxide lasers may be used to treat rhinophyma     HISTORY OF LUPUS    Additional History of Present Condition:  Patient reports history of lupus; denies taking medications for this except for NSAIDs for intermittent pain      Assessment and Plan:  Based on a thorough discussion of this condition and the management approach to it (including a comprehensive discussion of the known risks, side effects and potential benefits of treatment), the patient (family) agrees to implement the following specific plan:   Follows with rheumatologist   I note that the facial erythema due to backgound inflamatory papules is more consistent with rosacea rather than a cutaneous manifestation of systemic LE    SEBORRHEIC KERATOSIS; NON-INFLAMED    Physical Exam:   Anatomic Location Affected:  CHEST, back   Morphological Description:  Isamar Good verrucous thin plaques and macules   Pertinent Positives:   Pertinent Negatives: Additional History of Present Condition:  Patient reports new bumps on the skin  Denies itch, burn, pain, bleeding or ulceration  Present constantly; nothing seems to make it worse or better  No prior treatment  Assessment and Plan:  Based on a thorough discussion of this condition and the management approach to it (including a comprehensive discussion of the known risks, side effects and potential benefits of treatment), the patient (family) agrees to implement the following specific plan:   Reassured benign    Seborrheic Keratosis  A seborrheic keratosis is a harmless warty spot that appears during adult life as a common sign of skin aging  Seborrheic keratoses can arise on any area of skin, covered or uncovered, with the usual exception of the palms and soles  They do not arise from mucous membranes  Seborrheic keratoses can have highly variable appearance  Seborrheic keratoses are extremely common  It has been estimated that over 90% of adults over the age of 61 years have one or more of them  They occur in males and females of all races, typically beginning to erupt in the 35s or 45s  They are uncommon under the age of 21 years  The precise cause of seborrhoeic keratoses is not known  Seborrhoeic keratoses are considered degenerative in nature  As time goes by, seborrheic keratoses tend to become more numerous  Some people inherit a tendency to develop a very large number of them; some people may have hundreds of them      The name "seborrheic keratosis" is misleading, because these lesions are not limited to a seborrhoeic distribution (scalp, mid-face, chest, upper back), nor are they formed from sebaceous glands, nor are they associated with sebum -- which is greasy  Seborrheic keratosis may also be called "SK," "Seb K," "basal cell papilloma," "senile wart," or "barnacle "      Researchers have noted:   Eruptive seborrhoeic keratoses can follow sunburn or dermatitis   Skin friction may be the reason they appear in body folds   Viral cause (e g , human papillomavirus) seems unlikely   Stable and clonal mutations or activation of FRFR3, PIK3CA, TL, AKT1 and EGFR genes are found in seborrhoeic keratoses   Seborrhoeic keratosis can arise from solar lentigo   FRFR3 mutations also arise in solar lentigines  These mutations are associated with increased age and location on the head and neck, suggesting a role of ultraviolet radiation in these lesions   Seborrheic keratoses do not harbour tumour suppressor gene mutations   Epidermal growth factor receptor inhibitors, which are used to treat some cancers, often result in an increase in verrucal (warty) keratoses  There is no easy way to remove multiple lesions on a single occasion  Unless a specific lesion is "inflamed" and is causing pain or stinging/burning or is bleeding, most insurance companies do not authorize treatment      Scribe Attestation    I,:  Sergio Castillo am acting as a scribe while in the presence of the attending physician :       I,:  Haylie Hanson MD personally performed the services described in this documentation    as scribed in my presence :

## 2022-03-07 ENCOUNTER — OFFICE VISIT (OUTPATIENT)
Dept: INTERNAL MEDICINE CLINIC | Facility: CLINIC | Age: 47
End: 2022-03-07
Payer: COMMERCIAL

## 2022-03-07 VITALS
TEMPERATURE: 98.4 F | OXYGEN SATURATION: 97 % | BODY MASS INDEX: 34.56 KG/M2 | WEIGHT: 228 LBS | HEIGHT: 68 IN | HEART RATE: 84 BPM | SYSTOLIC BLOOD PRESSURE: 130 MMHG | DIASTOLIC BLOOD PRESSURE: 90 MMHG

## 2022-03-07 DIAGNOSIS — L71.9 ROSACEA: ICD-10-CM

## 2022-03-07 DIAGNOSIS — K21.00 GASTROESOPHAGEAL REFLUX DISEASE WITH ESOPHAGITIS WITHOUT HEMORRHAGE: ICD-10-CM

## 2022-03-07 DIAGNOSIS — I10 ESSENTIAL HYPERTENSION: Primary | ICD-10-CM

## 2022-03-07 DIAGNOSIS — R73.9 HYPERGLYCEMIA: ICD-10-CM

## 2022-03-07 DIAGNOSIS — M32.9 SLE (SYSTEMIC LUPUS ERYTHEMATOSUS RELATED SYNDROME) (HCC): ICD-10-CM

## 2022-03-07 DIAGNOSIS — E55.9 VITAMIN D DEFICIENCY: ICD-10-CM

## 2022-03-07 DIAGNOSIS — D69.6 THROMBOCYTOPENIA (HCC): ICD-10-CM

## 2022-03-07 DIAGNOSIS — E78.2 MIXED HYPERLIPIDEMIA: ICD-10-CM

## 2022-03-07 PROCEDURE — 99214 OFFICE O/P EST MOD 30 MIN: CPT | Performed by: INTERNAL MEDICINE

## 2022-03-07 PROCEDURE — 3008F BODY MASS INDEX DOCD: CPT | Performed by: INTERNAL MEDICINE

## 2022-03-07 NOTE — PROGRESS NOTES
Ann-Marie Blend Ann-Marie Blend Ann-Marie Blend   Ann-Marie Blend Assessment/Plan:    1  Essential hypertension  Assessment & Plan:  His blood pressure slightly elevated  Discussed with the patient will presently continue atenolol 50 mg daily and lisinopril 40 mg daily  Advised to watch diet for salt intake  Check blood pressure at home and keep log  If persistently elevated will adjust medication    Orders:  -     Comprehensive metabolic panel; Future    2  Gastroesophageal reflux disease with esophagitis without hemorrhage  Assessment & Plan:  His symptoms are controlled on famotidine 20 mg b i d Advised to watch diet for spicy foods, caffeinated beverages, alcoholic beverages, soda, citrus fluids and foods  Advised for reflux precautions  3  Mixed hyperlipidemia  Assessment & Plan:  Cholesterol 152, triglyceride increased from 137 to  205, LDL 87 HDL 30 last time  Advised for low-cholesterol low saturated fat low carbs diet  Will follow lipid panel  Orders:  -     Lipid panel; Future    4  Hyperglycemia  Assessment & Plan:  His last blood sugar was 110  Advised to watch diet for sugar carbs intake  Will check  hemoglobin A1c  Orders:  -     Comprehensive metabolic panel; Future  -     Hemoglobin A1C; Future    5  Thrombocytopenia ([facility] )  Assessment & Plan:  His platelet count was last time 133 K  Normal hemoglobin and WBC will follow CBC  Orders:  -     CBC and differential; Future    6  Vitamin D deficiency  Assessment & Plan:  Vitamin-D deficiency resolved after being on vitamin-D supplement  Advised to continue vitamin-D daily  Last vitamin-D level 37       7  SLE (systemic lupus erythematosus related syndrome) ([facility] )  Assessment & Plan:  Patient had a rheumatological evaluation few years ago  He was seen by 2 different rheumatologist Dr Jay Roman and Dr Neeraj Worley     Advised to follow with rheumatologist for follow-up  Orders:  -     Ambulatory Referral to Rheumatology;  Future  -     CBC and differential; Future  -     Comprehensive metabolic panel; Future    8  BMI 34 0-34 9,adult  Assessment & Plan:  Patient  was advised to decrease portion size  Advised to decrease carb, sugar, cholesterol intake  Advised to exercise 3-5 times per week  Advised to lose weight  9  Rosacea  Assessment & Plan:  He was seen by dermatologist   Will be going to start Metrogel as prescribed  Subjective:  Patient presents for follow-up  Patient ID: Alessandra Alvarado is a 50 y o  male  HPI   49-year-old white male patient presents for follow-up of his medical problems  He denies any chest pain, shortness of breath, pain in abdomen  Denies any cough, fever, chills  Denies any nausea, vomiting, diarrhea  He was seen by dermatologist for skin lesions and rash  on the face  He was prescribed Metrogel cream to apply on his face for rosacea  The following portions of the patient's history were reviewed and updated as appropriate:     Past Medical History:  He has a past medical history of Allergic rhinitis, BMI 34 0-34 9,adult (3/7/2022), BMI 35 0-35 9,adult (12/3/2021), BMI 36 0-36 9,adult (08/31/2021), Colon polyps (12/3/2021), Constipation, Elevated liver enzymes, Essential hypertension (02/27/2021), Fatigue, Gastric polyps, Gastroesophageal reflux disease with esophagitis without hemorrhage (12/3/2021), GERD (gastroesophageal reflux disease), Hyperglycemia (02/27/2021), Hypertension, Lupus ([facility] ), Mixed hyperlipidemia (02/27/2021), Obesity, Rosacea, Rosacea (3/7/2022), Seizures ([facility] ), Skin lesion (08/31/2021), SLE (systemic lupus erythematosus related syndrome) ([facility] ) (02/27/2021), Systemic lupus erythematosus ([facility] ), Thrombocyt[facility] ([facility] ) (02/27/2021), and Vitamin D deficiency (02/27/2021)  ,  _______________________________________________________________________  Past Surgical History:   has a past surgical history that includes Colonoscopy (01/12/2018);  Colonoscopy; EGD; and Skin biopsy  ,  _______________________________________________________________________  Family History:  family history includes Cancer in his father; Colon cancer in his father, maternal aunt, and paternal aunt; Dementia in his mother; Lung cancer in his paternal grandfather; Skin cancer in his paternal uncle ,  _______________________________________________________________________  Social History:   reports that he has been smoking cigars  He has never used smokeless tobacco  He reports current alcohol use  He reports that he does not use drugs  ,  _______________________________________________________________________  Allergies:  is allergic to omeprazole     _______________________________________________________________________  Current Outpatient Medications   Medication Sig Dispense Refill    aspirin 81 mg chewable tablet Chew 81 mg daily      atenolol (TENORMIN) 50 mg tablet Take 1 tablet (50 mg total) by mouth daily 90 tablet 1    famotidine (PEPCID) 20 mg tablet Take 1 tablet (20 mg total) by mouth 2 (two) times a day 60 tablet 3    lisinopril (ZESTRIL) 40 mg tablet Take 1 tablet (40 mg total) by mouth daily 90 tablet 1    metroNIDAZOLE (METROGEL) 0 75 % gel Apply once daily to face 45 g 4    multivitamin (THERAGRAN) TABS Take 2 tablets by mouth daily      Vitamin D, Cholecalciferol, 50 MCG (2000 UT) CAPS Take 1 capsule by mouth daily       No current facility-administered medications for this visit      _______________________________________________________________________  Review of Systems   Constitutional: Negative for chills, fatigue and fever  HENT: Negative for congestion, ear pain, hearing loss, nosebleeds, sinus pain, sore throat and trouble swallowing  Eyes: Negative for discharge, redness and visual disturbance  Respiratory: Negative for cough, chest tightness and shortness of breath  Cardiovascular: Negative for chest pain and palpitations     Gastrointestinal: Negative for abdominal pain, blood in stool, constipation, diarrhea, nausea and vomiting  Genitourinary: Negative for dysuria, flank pain, frequency and hematuria  Musculoskeletal: Negative for arthralgias, myalgias and neck pain  Skin: Negative for color change and rash  Neurological: Negative for dizziness, speech difficulty, weakness and headaches  Hematological: Does not bruise/bleed easily  Psychiatric/Behavioral: Negative for agitation and behavioral problems  Objective:  Vitals:    03/07/22 1614   BP: 130/90   BP Location: Right arm   Patient Position: Sitting   Cuff Size: Adult   Pulse: 84   Temp: 98 4 °F (36 9 °C)   TempSrc: Tympanic   SpO2: 97%   Weight: 103 kg (228 lb)   Height: 5' 8" (1 727 m)     Body mass index is 34 67 kg/m²  Physical Exam  Vitals and nursing note reviewed  Constitutional:       General: He is not in acute distress  Appearance: Normal appearance  He is normal weight  HENT:      Head: Normocephalic and atraumatic  Right Ear: Ear canal and external ear normal       Left Ear: Ear canal and external ear normal       Nose:      Comments: Patient has a face mask on     Mouth/Throat:      Comments: Patient has a face mask on  Eyes:      General: No scleral icterus  Right eye: No discharge  Left eye: No discharge  Extraocular Movements: Extraocular movements intact  Conjunctiva/sclera: Conjunctivae normal    Cardiovascular:      Rate and Rhythm: Normal rate and regular rhythm  Pulses: Normal pulses  Heart sounds: No murmur heard  Pulmonary:      Effort: Pulmonary effort is normal  No respiratory distress  Breath sounds: Normal breath sounds  Abdominal:      General: Bowel sounds are normal       Palpations: Abdomen is soft  Tenderness: There is no abdominal tenderness  Musculoskeletal:         General: Normal range of motion  Cervical back: Normal range of motion and neck supple  Right lower leg: No edema  Left lower leg: No edema  Skin:     General: Skin is warm  Findings: Rash (On face) present  Neurological:      General: No focal deficit present  Mental Status: He is alert and oriented to person, place, and time  Motor: No weakness  Coordination: Coordination normal    Psychiatric:         Mood and Affect: Mood normal          Behavior: Behavior normal        I spent 30 minutes with the patient today    More than 50% time spent for reviewing of external notes, reviewing of the results of diagnostics test, management of care, patient education and ordering of test

## 2022-03-07 NOTE — PATIENT INSTRUCTIONS
Patient was advised to continue present medications  discussed with the patient medications and laboratory data in detail  Follow-up with me in 3 months or as advised  If any blood test was ordered please do 1 week prior to next appointment unless advise to get earlier    If you have any questions please call the office [phone]

## 2022-03-08 NOTE — ASSESSMENT & PLAN NOTE
His symptoms are controlled on famotidine 20 mg b i d Advised to watch diet for spicy foods, caffeinated beverages, alcoholic beverages, soda, citrus fluids and foods  Advised for reflux precautions

## 2022-03-08 NOTE — ASSESSMENT & PLAN NOTE
Patient had a rheumatological evaluation few years ago  He was seen by 2 different rheumatologist Dr Hang Machado and Dr Rudolph Carranza     Advised to follow with rheumatologist for follow-up

## 2022-03-08 NOTE — ASSESSMENT & PLAN NOTE
Cholesterol 152, triglyceride increased from 137 to  205, LDL 87 HDL 30 last time  Advised for low-cholesterol low saturated fat low carbs diet  Will follow lipid panel

## 2022-03-08 NOTE — ASSESSMENT & PLAN NOTE
His blood pressure slightly elevated  Discussed with the patient will presently continue atenolol 50 mg daily and lisinopril 40 mg daily  Advised to watch diet for salt intake  Check blood pressure at home and keep log    If persistently elevated will adjust medication

## 2022-03-08 NOTE — ASSESSMENT & PLAN NOTE
His last blood sugar was 110  Advised to watch diet for sugar carbs intake  Will check  hemoglobin A1c

## 2022-03-08 NOTE — ASSESSMENT & PLAN NOTE
Vitamin-D deficiency resolved after being on vitamin-D supplement  Advised to continue vitamin-D daily  Last vitamin-D level 37

## 2022-03-08 NOTE — ASSESSMENT & PLAN NOTE
Fang Bautista MD at bedside for eval     ED visit d/t overdose, presumed to venlafaxine, unknown amount - also noted to have Rx to new venlafaxine / xanax;;    Nasal trumpet to (R) nare for airway management when the pt arrived, placed by [facility] RT    Pt arrived as nonverbal, unable to maintain airway, clenching and biting down / sz like activity at this time - non responsive to pain - normal POX at this time    Shortly after, pt was able to maintain airway, eye were moving and responsive to surrounding, following simple commands at this time    0705 - pt continues to have improved mental status - speaking at this time - A/Ox4 at this time Bar Aguilar MD at bedside for reeval;;    0730 - Bedside shift change report given to Phil Russo / Benji Townsend RN (oncoming nurse) by Kelly Cortes (offgoing nurse). Report included the following information SBAR, Kardex, ED Summary, Intake/Output and MAR. He was seen by dermatologist   Will be going to start Metrogel as prescribed

## 2022-05-17 ENCOUNTER — TELEPHONE (OUTPATIENT)
Dept: INTERNAL MEDICINE CLINIC | Facility: CLINIC | Age: 47
End: 2022-05-17

## 2022-05-17 DIAGNOSIS — I10 ESSENTIAL HYPERTENSION: ICD-10-CM

## 2022-05-17 RX ORDER — LISINOPRIL 40 MG/1
40 TABLET ORAL DAILY
Qty: 90 TABLET | Refills: 1 | Status: SHIPPED | OUTPATIENT
Start: 2022-05-17

## 2022-05-17 NOTE — TELEPHONE ENCOUNTER
lisinopril (ZESTRIL) 40 mg tablet   Sig: Take 1 tablet (40 mg total) by mouth daily  #90    Send to Aramis Coughlin

## 2022-06-29 DIAGNOSIS — I10 ESSENTIAL HYPERTENSION: ICD-10-CM

## 2022-06-29 RX ORDER — ATENOLOL 50 MG/1
50 TABLET ORAL DAILY
Qty: 90 TABLET | Refills: 1 | Status: SHIPPED | OUTPATIENT
Start: 2022-06-29

## 2022-07-13 NOTE — PROGRESS NOTES
Assessment and Plan:   Mr Naila Beckford is a 40-year-old male with history significant for possible systemic lupus erythematosus and rosacea who presents for an evaluation  He is currently not on DMARDs  He is referred by Dr Neslon Lewis for a rheumatology consult  Josselin Perez presents today for a re-evaluation of systemic lupus erythematosus which was diagnosed approximately 15 years ago based on a positive MURRAY as well as symptoms of a facial rash which appears consistent with rosacea as well as chronic joint pains  There has been no specific treatment for this and he has not been seen by Rheumatology in at least 10 years  He continues to experience the chronic complaints of a facial rash and arthralgias, but as mentioned I suspect the facial rash is more consistent with rosacea and the arthralgias are likely related to 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  To re-evaluate the lupus diagnosis I would like to complete his serologies as listed below and see him back for a follow-up visit to review the results and determine if there are any abnormalities that may be concerning in the context of his symptoms  In the meanwhile for the joint pains he may continue with Tylenol as needed and I advised him to start the metronidazole ointment for the rosacea  - In regards to the left elbow pain with a focal area of swelling I question if this may be related to a triceps tendon tear  I will further evaluate by obtaining an ultrasound of this region and determine if he may need an orthopedics appointment  Plan:  Diagnoses and all orders for this visit:    History of systemic lupus erythematosus ([facility] )  -     Antinuclear Antibodies, IFA; Future  -     Anti-DNA antibody, double-stranded; Future  -     Anti-Susan 1 Antibody; Future  -     Anti-scleroderma antibody; Future  -     Centromere Antibody; Future  -     Sjogren's Antibodies; Future  -     Nuclear antigen antibody;  Future  - C3 complement; Future  -     C4 complement; Future  -     Beta-2 glycoprotein antibodies; Future  -     Cardiolipin antibody; Future  -     Lupus anticoagulant; Future  -     Urinalysis with microscopic  -     Protein / creatinine ratio, urine  -     CBC and differential; Future  -     Comprehensive metabolic panel; Future  -     C-reactive protein; Future  -     Sedimentation rate, automated; Future  -     Thyroid Antibodies Panel; Future  -     Ambulatory Referral to Rheumatology  -     Rheumatoid Arthritis Profile; Future    Rosacea    Diffuse arthralgia  -     Antinuclear Antibodies, IFA; Future  -     Anti-DNA antibody, double-stranded; Future  -     Anti-Susan 1 Antibody; Future  -     Anti-scleroderma antibody; Future  -     Centromere Antibody; Future  -     Sjogren's Antibodies; Future  -     Nuclear antigen antibody; Future  -     C3 complement; Future  -     C4 complement; Future  -     Beta-2 glycoprotein antibodies; Future  -     Cardiolipin antibody; Future  -     Lupus anticoagulant; Future  -     Urinalysis with microscopic  -     Protein / creatinine ratio, urine  -     CBC and differential; Future  -     Comprehensive metabolic panel; Future  -     C-reactive protein; Future  -     Sedimentation rate, automated; Future  -     Thyroid Antibodies Panel; Future  -     Rheumatoid Arthritis Profile; Future    Left elbow pain  -     US MSK limited; Future    Need for hepatitis C screening test  -     Chronic Hepatitis Panel; Future      I have personally reviewed pertinent films in PACS of the right hip XR which is normal        Activities as tolerated  Exercise: try to maintain a low impact exercise regimen as much as possible  Walk for 30 minutes a day for at least 3 days a week  Continue other medications as prescribed by PCP and other specialists  RTC in 3 months          HPI  Mr Altagracia Munoz is a 49-year-old male with history significant for possible systemic lupus erythematosus and rosacea who presents for an evaluation  He is currently not on DMARDs  He is referred by Dr Melvin Medellin for a rheumatology consult  Patient reports he was initially evaluated by Rheumatology approximately 15 years ago after he was found to have a positive MURRAY (I am assuming) which was detected in view of a facial rash and body pain he was experiencing  He was evaluated by Dr Jamia Das and diagnosed with systemic lupus erythematosus  There was a discussion about starting him on hydroxychloroquine but patient opted not to do this due to concerns for potential side effects  He reports due to a change in insurance he then had to see Dr Severa Petrin but did not continue long-term follow-up  He is unsure of what Dr Felice Cabrera diagnosis or recommendations were as he was only seen for 2 office visits  He is presenting today to re-evaluate the diagnosis  He reports chronic joint pains for more than 15 years now which has affected his elbows, knees and feet for the most part  He denies joint swelling  He experiences morning stiffness affecting him diffusely which can take about 30 minutes to improve  He takes Tylenol infrequently as needed which does help with the body pain  Advil does not help with joint pains  He has also noticed an area of swelling above his left elbow joint which occasionally bothers him  He had an elbow x-ray done which was normal   He reports a chronic facial rash affecting his nose and forehead for which he was recently seen by dermatology and diagnosed with rosacea  He was prescribed metronidazole ointment but he has not started this yet  In the past he mentions Rheumatology was concerned about a malar rash  He reports that this rash comes and goes spontaneously  He reports chronic dry eyes and dry mouth      He denies fevers, unintentional weight loss, focal alopecia, inflammatory eye disease, psoriasis, photosensitivity, mouth/nose ulcers, swollen glands, pleuritic chest pain, inflammatory bowel disease, blood clots, Raynaud's or a family history of autoimmune disease  Of note 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  The following portions of the patient's history were reviewed and updated as appropriate: allergies, current medications, past family history, past medical history, past social history, past surgical history and problem list       Review of Systems  Constitutional: Negative for weight change, fevers, chills, night sweats, fatigue  ENT/Mouth: Negative for hearing changes, ear pain, nasal congestion, sinus pain, hoarseness, sore throat, rhinorrhea, swallowing difficulty  Eyes: Negative for pain, redness, discharge, vision changes  Cardiovascular: Negative for chest pain, SOB, palpitations  Respiratory: Negative for cough, sputum, wheezing, dyspnea  Gastrointestinal: Negative for nausea, vomiting, diarrhea, constipation, pain, heartburn  Genitourinary: Negative for dysuria, urinary frequency, hematuria  Musculoskeletal: As per HPI  Skin: Negative for color changes  Positive for skin rash  Neuro: Negative for weakness, numbness, tingling, loss of consciousness  Psych: Negative for anxiety, depression  Heme/Lymph: Negative for easy bruising, bleeding, lymphadenopathy          Past Medical History:   Diagnosis Date    Allergic rhinitis     BMI 34 0-34 9,adult 3/7/2022    BMI 35 0-35 9,adult 12/3/2021    BMI 36 0-36 9,adult 08/31/2021    Colon polyps 12/3/2021    Constipation     Elevated liver enzymes     Essential hypertension 02/27/2021    Fatigue     Gastric polyps     history of    Gastroesophageal reflux disease with esophagitis without hemorrhage 12/3/2021    GERD (gastroesophageal reflux disease)     Hyperglycemia 02/27/2021    Hypertension     [facility] )     Mixed hyperlipidemia 02/27/2021    Obesity     Rosacea     Rosacea 3/7/2022    Seizures ([facility] )     6year old sport injury only-no recurrance    Skin lesion 08/31/2021    SLE (systemic lupus erythematosus related syndrome) ([facility] ) 02/27/2021    Systemic lupus erythematosus ([facility] )     Thrombocytopenia ([facility] ) 02/27/2021    Vitamin D deficiency 02/27/2021       Past Surgical History:   Procedure Laterality Date    COLONOSCOPY  01/12/2018    by Dr Rex Woo; needs f/u 1/2023     COLONOSCOPY      EGD      SKIN BIOPSY         Social History     Socioeconomic History    Marital status: /Civil Union     Spouse name: Not on file    Number of children: Not on file    Years of education: Not on file    Highest education level: Not on file   Occupational History    Not on file   Tobacco Use    Smoking status: Light Tobacco Smoker     Types: Cigars    Smokeless tobacco: Never Used    Tobacco comment: once a month-cigar   Vaping Use    Vaping Use: Never used   Substance and Sexual Activity    Alcohol use: Yes     Comment: Occasional - As per [employer]-wine or beer    Drug use: Never    Sexual activity: Yes   Other Topics Concern    Not on file   Social History Narrative    Annual eye exam: Advise    Annual dental checkup: Sees q6months    - As per [employer]     Social Determinants of Health     Financial Resource Strain: Not on file   Food Insecurity: Not on file   Transportation Needs: Not on file   Physical Activity: Not on file   Stress: Not on file   Social Connections: Not on file   Intimate Partner Violence: Not on file   Housing Stability: Not on file       Family History   Problem Relation Age of Onset    Dementia Mother     Colon cancer Father     Cancer Father         colon    Lung cancer Paternal Grandfather     Colon cancer Paternal Aunt     Skin cancer Paternal Uncle     Colon cancer Maternal Aunt        Allergies   Allergen Reactions    Omeprazole GI Intolerance     He developed constipation       Current Outpatient Medications:     aspirin 81 mg chewable tablet, Chew 81 mg daily, Disp: , Rfl:    atenolol (TENORMIN) 50 mg tablet, Take 1 tablet (50 mg total) by mouth daily, Disp: 90 tablet, Rfl: 1    famotidine (PEPCID) 20 mg tablet, Take 1 tablet (20 mg total) by mouth 2 (two) times a day, Disp: 60 tablet, Rfl: 3    lisinopril (ZESTRIL) 40 mg tablet, Take 1 tablet (40 mg total) by mouth in the morning , Disp: 90 tablet, Rfl: 1    metroNIDAZOLE (METROGEL) 0 75 % gel, Apply once daily to face, Disp: 45 g, Rfl: 4    multivitamin (THERAGRAN) TABS, Take 2 tablets by mouth daily, Disp: , Rfl:     Vitamin D, Cholecalciferol, 50 MCG (2000 UT) CAPS, Take 1 capsule by mouth daily, Disp: , Rfl:       Objective:    Vitals:    07/14/22 1340   BP: 148/90   Pulse: 94   Temp: 98 4 °F (36 9 °C)   Weight: 103 kg (228 lb)       Physical Exam  General: Well appearing, well nourished, in no distress  Oriented x 3, normal mood and affect  Ambulating without difficulty  Skin: Good turgor, no unusual bruising or prominent lesions  Facial rash with telangiectasias noted on the tip of his nose and central forehead, this appears consistent with rosacea  No malar rash  Hair:  Male pattern baldness  Nails: Normal color, no deformities  HEENT:  Head: Normocephalic, atraumatic  Eyes: Conjunctiva clear, sclera non-icteric, EOM intact  Extremities: No amputations or deformities, cyanosis, edema  Musculoskeletal:   There is no peripheral joint soft tissue swelling or tenderness noted  Negative fibromyalgia tender points  He does have an area of fullness just above the left elbow region, I question if this may be a triceps tendon tear  Neurologic: Alert and oriented  No focal neurological deficits appreciated  Psychiatric: Normal mood and affect  HUGH Cornejo    Rheumatology

## 2022-07-14 ENCOUNTER — OFFICE VISIT (OUTPATIENT)
Dept: RHEUMATOLOGY | Facility: CLINIC | Age: 47
End: 2022-07-14
Payer: COMMERCIAL

## 2022-07-14 VITALS
DIASTOLIC BLOOD PRESSURE: 90 MMHG | TEMPERATURE: 98.4 F | HEART RATE: 94 BPM | WEIGHT: 228 LBS | SYSTOLIC BLOOD PRESSURE: 148 MMHG | BODY MASS INDEX: 34.67 KG/M2

## 2022-07-14 DIAGNOSIS — L71.9 ROSACEA: ICD-10-CM

## 2022-07-14 DIAGNOSIS — Z11.59 NEED FOR HEPATITIS C SCREENING TEST: ICD-10-CM

## 2022-07-14 DIAGNOSIS — M25.50 DIFFUSE ARTHRALGIA: ICD-10-CM

## 2022-07-14 DIAGNOSIS — M25.522 LEFT ELBOW PAIN: ICD-10-CM

## 2022-07-14 DIAGNOSIS — M32.9 HISTORY OF SYSTEMIC LUPUS ERYTHEMATOSUS (HCC): Primary | ICD-10-CM

## 2022-07-14 PROCEDURE — 99205 OFFICE O/P NEW HI 60 MIN: CPT | Performed by: INTERNAL MEDICINE

## 2022-07-18 DIAGNOSIS — K21.00 GASTROESOPHAGEAL REFLUX DISEASE WITH ESOPHAGITIS WITHOUT HEMORRHAGE: ICD-10-CM

## 2022-07-18 RX ORDER — FAMOTIDINE 20 MG/1
20 TABLET, FILM COATED ORAL 2 TIMES DAILY
Qty: 60 TABLET | Refills: 3 | Status: SHIPPED | OUTPATIENT
Start: 2022-07-18

## 2022-08-23 ENCOUNTER — HOSPITAL ENCOUNTER (OUTPATIENT)
Dept: RADIOLOGY | Facility: HOSPITAL | Age: 47
Discharge: HOME | End: 2022-08-23
Attending: INTERNAL MEDICINE
Payer: COMMERCIAL

## 2022-08-23 DIAGNOSIS — M25.522 LEFT ELBOW PAIN: ICD-10-CM

## 2022-08-23 PROCEDURE — 76882 US LMTD JT/FCL EVL NVASC XTR: CPT

## 2022-08-24 LAB — HBA1C MFR BLD HPLC: 5.3 %

## 2022-08-25 ENCOUNTER — OFFICE VISIT (OUTPATIENT)
Dept: INTERNAL MEDICINE CLINIC | Facility: CLINIC | Age: 47
End: 2022-08-25
Payer: COMMERCIAL

## 2022-08-25 VITALS
WEIGHT: 228 LBS | HEIGHT: 68 IN | SYSTOLIC BLOOD PRESSURE: 126 MMHG | DIASTOLIC BLOOD PRESSURE: 80 MMHG | OXYGEN SATURATION: 97 % | RESPIRATION RATE: 14 BRPM | HEART RATE: 82 BPM | BODY MASS INDEX: 34.56 KG/M2 | TEMPERATURE: 98.4 F

## 2022-08-25 DIAGNOSIS — E78.2 MIXED HYPERLIPIDEMIA: ICD-10-CM

## 2022-08-25 DIAGNOSIS — I10 ESSENTIAL HYPERTENSION: Primary | ICD-10-CM

## 2022-08-25 DIAGNOSIS — E55.9 VITAMIN D DEFICIENCY: ICD-10-CM

## 2022-08-25 DIAGNOSIS — D69.6 THROMBOCYTOPENIA (HCC): ICD-10-CM

## 2022-08-25 DIAGNOSIS — L71.9 ROSACEA: ICD-10-CM

## 2022-08-25 DIAGNOSIS — M32.9 SLE (SYSTEMIC LUPUS ERYTHEMATOSUS RELATED SYNDROME) (HCC): ICD-10-CM

## 2022-08-25 DIAGNOSIS — K21.00 GASTROESOPHAGEAL REFLUX DISEASE WITH ESOPHAGITIS WITHOUT HEMORRHAGE: ICD-10-CM

## 2022-08-25 DIAGNOSIS — R73.9 HYPERGLYCEMIA: ICD-10-CM

## 2022-08-25 PROCEDURE — 99214 OFFICE O/P EST MOD 30 MIN: CPT | Performed by: INTERNAL MEDICINE

## 2022-08-25 NOTE — PROGRESS NOTES
Assessment/Plan:    1  Essential hypertension  Assessment & Plan:  Blood pressure well controlled  Advised to continue present medication  Advised for low-salt diet  2  Gastroesophageal reflux disease with esophagitis without hemorrhage  Assessment & Plan:  His symptoms are well controlled on famotidine 20 mg b i d Advised to watch diet for spicy foods, caffeinated beverages, alcoholic beverages, soda, citrus fluids and foods  Advised for reflux precautions  3  Mixed hyperlipidemia  Assessment & Plan:  His cholesterol 152 triglyceride increased from 205 to 359 HDL decreased from 30-25 LDL 70  Discussed with the patient to start fish oil tablet  Take 1 tablet b i d   Advised to watch diet for cholesterol, saturated fat, carbs  Advised to exercise lose weight  4  Hyperglycemia  Assessment & Plan:  His fasting blood sugar decreased from 110-107  Hemoglobin A1c 5 3  Advised to watch diet for sugar and carbs intake  5  Vitamin D deficiency  Assessment & Plan:  Vitamin-D deficiency resolved after being on vitamin-D supplement  His vitamin-D level 37  Patient has been taking vitamin-D 2000 International Units daily  6  Thrombocytopenia ([facility] )  Assessment & Plan:  His platelet counts increased from 133-149 K with normal WBC and normal hemoglobin  Will observe and follow-up  7  SLE (systemic lupus erythematosus related syndrome) ([facility] )  Assessment & Plan:  he was seen by rheumatologist had a workup result pending  8  BMI 34 0-34 9,adult    9  Rosacea  Assessment & Plan:  Patient state he  was seen by rheumatologist   He was prescribed Metrogel to apply on the face for possible rosacea but he has not started yet  Patient state he Will start  BMI Counseling: Body mass index is 34 67 kg/m²   The BMI is above normal  Nutrition recommendations include decreasing portion sizes, decreasing fast food intake, consuming healthier snacks, limiting drinks that contain sugar, moderation in carbohydrate intake, reducing intake of saturated and trans fat and reducing intake of cholesterol  Exercise recommendations include exercising 3-5 times per week  No pharmacotherapy was ordered  Rationale for BMI follow-up plan is due to patient being overweight or obese  Subjective:  Patient presents for follow-up      Patient ID: Obdulia Mondragon is a 50 y o  male  HPI  26-year-old white male patient presents follow-up his medical problems  He denies any chest pain, shortness a of breath, pain in abdomen  Denies any cough, fever, chills  Denies any nausea vomiting, diarrhea  He was seen by rheumatologist   He had a blood test ordered by rheumatologist result pending  He was prescribed Metrogel for possible  rosacea on the face by rheumatologist  He had ultrasound the left elbow for left elbow pain  The following portions of the patient's history were reviewed and updated as appropriate:     Past Medical History:  He has a past medical history of Allergic rhinitis, BMI 34 0-34 9,adult (3/7/2022), BMI 35 0-35 9,adult (12/3/2021), BMI 36 0-36 9,adult (08/31/2021), Colon polyps (12/3/2021), Constipation, Elevated liver enzymes, Essential hypertension (02/27/2021), Fatigue, Gastric polyps, Gastroesophageal reflux disease with esophagitis without hemorrhage (12/3/2021), GERD (gastroesophageal reflux disease), Hyperglycemia (02/27/2021), Hypertension, Lupus ([facility] ), Mixed hyperlipidemia (02/27/2021), Obesity, Rosacea, Rosacea (3/7/2022), Seizures ([facility] ), Skin lesion (08/31/2021), SLE (systemic lupus erythematosus related syndrome) ([facility] ) (02/27/2021), Systemic lupus erythematosus ([facility] ), Thrombocytopenia ([facility] ) (02/27/2021), and Vitamin D deficiency (02/27/2021)  ,  _______________________________________________________________________  Past Surgical History:   has a past surgical history that includes Colonoscopy (01/12/2018);  Colonoscopy; EGD; and Skin biopsy  ,  _______________________________________________________________________  Family History:  family history includes Cancer in his father; Colon cancer in his father, maternal aunt, and paternal aunt; Dementia in his mother; Lung cancer in his paternal grandfather; Skin cancer in his paternal uncle ,  _______________________________________________________________________  Social History:   reports that he has been smoking cigars  He has never used smokeless tobacco  He reports current alcohol use  He reports that he does not use drugs  ,  _______________________________________________________________________  Allergies:  is allergic to omeprazole     _______________________________________________________________________  Current Outpatient Medications   Medication Sig Dispense Refill    aspirin 81 mg chewable tablet Chew 81 mg daily      atenolol (TENORMIN) 50 mg tablet Take 1 tablet (50 mg total) by mouth daily 90 tablet 1    famotidine (PEPCID) 20 mg tablet Take 1 tablet (20 mg total) by mouth 2 (two) times a day 60 tablet 3    lisinopril (ZESTRIL) 40 mg tablet Take 1 tablet (40 mg total) by mouth in the morning  90 tablet 1    multivitamin (THERAGRAN) TABS Take 2 tablets by mouth daily      Vitamin D, Cholecalciferol, 50 MCG (2000 UT) CAPS Take 1 capsule by mouth daily      metroNIDAZOLE (METROGEL) 0 75 % gel Apply once daily to face (Patient not taking: Reported on 8/25/2022) 45 g 4     No current facility-administered medications for this visit      _______________________________________________________________________  Review of Systems   Constitutional: Negative for chills, fatigue and fever  HENT: Negative for congestion, ear pain, hearing loss, nosebleeds, sinus pain, sore throat and trouble swallowing  Eyes: Negative for discharge, redness and visual disturbance  Respiratory: Negative for cough, chest tightness and shortness of breath      Cardiovascular: Negative for chest pain and palpitations  Gastrointestinal: Negative for abdominal pain, blood in stool, constipation, diarrhea, nausea and vomiting  Genitourinary: Negative for dysuria, flank pain, frequency and hematuria  Musculoskeletal: Positive for arthralgias  Negative for myalgias and neck pain  Skin: Positive for rash (Has a rash on the face)  Negative for color change  Neurological: Negative for dizziness, speech difficulty, weakness and headaches  Hematological: Does not bruise/bleed easily  Psychiatric/Behavioral: Negative for agitation and behavioral problems  Objective:  Vitals:    08/25/22 1606   BP: 126/80   BP Location: Left arm   Patient Position: Sitting   Cuff Size: Adult   Pulse: 82   Resp: 14   Temp: 98 4 °F (36 9 °C)   TempSrc: Tympanic   SpO2: 97%   Weight: 103 kg (228 lb)   Height: 5' 8" (1 727 m)     Body mass index is 34 67 kg/m²  Physical Exam  Vitals and nursing note reviewed  Constitutional:       General: He is not in acute distress  Appearance: Normal appearance  HENT:      Head: Normocephalic and atraumatic  Right Ear: Ear canal and external ear normal       Left Ear: Ear canal and external ear normal       Nose: Nose normal       Mouth/Throat:      Mouth: Mucous membranes are moist    Eyes:      General: No scleral icterus  Right eye: No discharge  Left eye: No discharge  Extraocular Movements: Extraocular movements intact  Conjunctiva/sclera: Conjunctivae normal    Cardiovascular:      Rate and Rhythm: Normal rate and regular rhythm  Pulses: Normal pulses  Heart sounds: No murmur heard  Pulmonary:      Effort: Pulmonary effort is normal  No respiratory distress  Breath sounds: Normal breath sounds  Abdominal:      General: Bowel sounds are normal       Palpations: Abdomen is soft  Tenderness: There is no abdominal tenderness     Musculoskeletal:         General: Tenderness (Has some tenderness on left elbow lateral epicondyle area) present  Normal range of motion  Cervical back: Normal range of motion and neck supple  No muscular tenderness  Right lower leg: No edema  Left lower leg: No edema  Skin:     General: Skin is warm  Findings: Rash ( has erythematous macular rash on both side of the face, has no vesicles  ) present  Neurological:      General: No focal deficit present  Mental Status: He is alert and oriented to person, place, and time  Motor: No weakness  Coordination: Coordination normal    Psychiatric:         Mood and Affect: Mood normal          Behavior: Behavior normal        I spent 30 minutes with the patient today    More than 50% time spent for reviewing of external notes, reviewing of the results of diagnostics test, management of care, patient education and ordering of test

## 2022-08-25 NOTE — PATIENT INSTRUCTIONS
Patient was advised to continue present medications  discussed with the patient medications and laboratory data in detail  Follow-up with me in 3 months or as advised  If any blood test was ordered please do 1 week prior to next appointment unless advise to get earlier    If you have any questions please call the office [phone]

## 2022-08-26 NOTE — ASSESSMENT & PLAN NOTE
His symptoms are well controlled on famotidine 20 mg b i d Advised to watch diet for spicy foods, caffeinated beverages, alcoholic beverages, soda, citrus fluids and foods  Advised for reflux precautions

## 2022-08-26 NOTE — ASSESSMENT & PLAN NOTE
His platelet counts increased from 133-149 K with normal WBC and normal hemoglobin  Will observe and follow-up

## 2022-08-26 NOTE — ASSESSMENT & PLAN NOTE
Patient state he  was seen by rheumatologist   He was prescribed Metrogel to apply on the face for possible rosacea but he has not started yet  Patient state he Will start

## 2022-08-26 NOTE — ASSESSMENT & PLAN NOTE
His cholesterol 152 triglyceride increased from 205 to 359 HDL decreased from 30-25 LDL 70  Discussed with the patient to start fish oil tablet  Take 1 tablet b i d   Advised to watch diet for cholesterol, saturated fat, carbs  Advised to exercise lose weight

## 2022-08-26 NOTE — ASSESSMENT & PLAN NOTE
Vitamin-D deficiency resolved after being on vitamin-D supplement  His vitamin-D level 37  Patient has been taking vitamin-D 2000 International Units daily

## 2022-08-26 NOTE — ASSESSMENT & PLAN NOTE
His fasting blood sugar decreased from 110-107  Hemoglobin A1c 5 3  Advised to watch diet for sugar and carbs intake

## 2022-10-24 ENCOUNTER — TELEPHONE (OUTPATIENT)
Dept: INTERNAL MEDICINE CLINIC | Facility: CLINIC | Age: 47
End: 2022-10-24

## 2022-10-27 ENCOUNTER — HOSPITAL ENCOUNTER (OUTPATIENT)
Dept: ULTRASOUND IMAGING | Facility: HOSPITAL | Age: 47
Discharge: HOME | End: 2022-10-27
Payer: COMMERCIAL

## 2022-10-27 ENCOUNTER — OFFICE VISIT (OUTPATIENT)
Dept: FAMILY MEDICINE CLINIC | Facility: CLINIC | Age: 47
End: 2022-10-27

## 2022-10-27 VITALS
RESPIRATION RATE: 18 BRPM | HEIGHT: 68 IN | HEART RATE: 89 BPM | WEIGHT: 229 LBS | DIASTOLIC BLOOD PRESSURE: 76 MMHG | SYSTOLIC BLOOD PRESSURE: 130 MMHG | TEMPERATURE: 97.1 F | OXYGEN SATURATION: 97 % | BODY MASS INDEX: 34.71 KG/M2

## 2022-10-27 DIAGNOSIS — M54.6 ACUTE MIDLINE THORACIC BACK PAIN: ICD-10-CM

## 2022-10-27 DIAGNOSIS — R10.9 ACUTE LEFT FLANK PAIN: ICD-10-CM

## 2022-10-27 DIAGNOSIS — Z13.89 SCREENING FOR BLOOD OR PROTEIN IN URINE: ICD-10-CM

## 2022-10-27 DIAGNOSIS — Z00.8 EXAM FOR POPULATION SURVEY: ICD-10-CM

## 2022-10-27 DIAGNOSIS — M94.0 COSTAL CHONDRITIS: Primary | ICD-10-CM

## 2022-10-27 DIAGNOSIS — Z23 ENCOUNTER FOR IMMUNIZATION: ICD-10-CM

## 2022-10-27 PROCEDURE — 76770 US EXAM ABDO BACK WALL COMP: CPT

## 2022-10-27 RX ORDER — IBUPROFEN 600 MG/1
600 TABLET ORAL EVERY 6 HOURS PRN
Qty: 30 TABLET | Refills: 0 | Status: SHIPPED | OUTPATIENT
Start: 2022-10-27

## 2022-10-27 NOTE — LETTER
October 27, 2022     Patient: Gigi Galeas  YOB: 1975  Date of Visit: 10/27/2022      To Whom it May Concern:    Kathyanne Mohs is under my professional care  Alireza Melo was seen in my office on 10/27/2022  Alireza Melo may return to work on 10/31/2022       If you have any questions or concerns, please don't hesitate to call           Sincerely,          HAWA Garcia        CC: No Recipients

## 2022-10-27 NOTE — PROGRESS NOTES
BMI Counseling: Body mass index is 34 82 kg/m²  The BMI is above normal  Nutrition recommendations include decreasing portion sizes, encouraging healthy choices of fruits and vegetables, decreasing fast food intake, consuming healthier snacks, limiting drinks that contain sugar, moderation in carbohydrate intake, increasing intake of lean protein, reducing intake of saturated and trans fat and reducing intake of cholesterol  Exercise recommendations include vigorous physical activity 75 minutes/week, exercising 3-5 times per week, obtaining a gym membership and strength training exercises  No pharmacotherapy was ordered  Rationale for BMI follow-up plan is due to patient being overweight or obese  Depression Screening and Follow-up Plan: Patient was screened for depression during today's encounter  They screened negative with a PHQ-2 score of 0  Tobacco Cessation Counseling: Tobacco cessation counseling was provided  The patient is sincerely urged to quit consumption of tobacco  He is not ready to quit tobacco  Medication options and side effects of medication discussed  Patient refused medication  Assessment/Plan:         Problem List Items Addressed This Visit        Musculoskeletal and Integument    Costal chondritis - Primary     Patient may take ibuprofen q 6 hours p r n  As needed for pain  Rice therapy encouraged  Chest x-ray ordered for further evaluation  Relevant Medications    ibuprofen (MOTRIN) 600 mg tablet    Other Relevant Orders    XR chest pa & lateral       Other    Screening for blood or protein in urine     Screening for blood and protein in urine  Relevant Orders    UA w Reflex to Microscopic w Reflex to Culture    Acute left flank pain     Patient ordered for ultrasound of the kidneys and bladder at this time for evaluation for kidney stones           Relevant Orders    US kidney and bladder with pvr    Acute midline thoracic back pain     EKG performed and within normal limits  Relevant Orders    POCT ECG (Completed)    Exam for population survey     CBC with diff and CMP ordered with UA for screening for blood and protein  Relevant Orders    Comprehensive metabolic panel    CBC and differential      Other Visit Diagnoses     Encounter for immunization                Subjective:      Patient ID: Avis Yuen is a 50 y o  male  Patient is a 53-year-old male that reports that when he got out of the shower one week ago and felt a sharp shooting pain in left lateral region of his back and has had pain since since  Indicates that he had vomited x1 since that time currently reports the pain has become less than last   States that he doesn't recall doing anything that would have caused injury  Patient works as a  and is on his feet most of the day  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 and rotate side to side  The following portions of the patient's history were reviewed and updated as appropriate:   Past Medical History:  He has a past medical history of Allergic rhinitis, BMI 34 0-34 9,adult (3/7/2022), BMI 35 0-35 9,adult (12/3/2021), BMI 36 0-36 9,adult (08/31/2021), Colon polyps (12/3/2021), Constipation, Elevated liver enzymes, Essential hypertension (02/27/2021), Fatigue, Gastric polyps, Gastroesophageal reflux disease with esophagitis without hemorrhage (12/3/2021), GERD (gastroesophageal reflux disease), Hyperglycemia (02/27/2021), Hypertension, Lupus ([facility] ), Mixed hyperlipidemia (02/27/2021), Obesity, Rosacea, Rosacea (3/7/2022), Seizures ([facility] ), Skin lesion (08/31/2021), SLE (systemic lupus erythematosus related syndrome) ([facility] ) (02/27/2021), Systemic lupus erythematosus ([facility] ), Thrombocytopenia ([facility] ) (02/27/2021), and Vitamin D deficiency (02/27/2021)  ,  _______________________________________________________________________  Medical Problems:  does not have any pertinent problems on file ,  _______________________________________________________________________  Past Surgical History:   has a past surgical history that includes Colonoscopy (01/12/2018); Colonoscopy; EGD; and Skin biopsy  ,  _______________________________________________________________________  Family History:  family history includes Cancer in his father; Colon cancer in his father, maternal aunt, and paternal aunt; Dementia in his mother; Lung cancer in his paternal grandfather; Skin cancer in his paternal uncle ,  _______________________________________________________________________  Social History:   reports that he has been smoking cigars  He has never used smokeless tobacco  He reports current alcohol use  He reports that he does not use drugs  ,  _______________________________________________________________________  Allergies:  is allergic to omeprazole     _______________________________________________________________________  Current Outpatient Medications   Medication Sig Dispense Refill   • aspirin 81 mg chewable tablet Chew 81 mg daily     • atenolol (TENORMIN) 50 mg tablet Take 1 tablet (50 mg total) by mouth daily 90 tablet 1   • famotidine (PEPCID) 20 mg tablet Take 1 tablet (20 mg total) by mouth 2 (two) times a day 60 tablet 3   • ibuprofen (MOTRIN) 600 mg tablet Take 1 tablet (600 mg total) by mouth every 6 (six) hours as needed for mild pain or moderate pain 30 tablet 0   • lisinopril (ZESTRIL) 40 mg tablet Take 1 tablet (40 mg total) by mouth in the morning   90 tablet 1   • multivitamin (THERAGRAN) TABS Take 2 tablets by mouth daily     • Vitamin D, Cholecalciferol, 50 MCG (2000 UT) CAPS Take 1 capsule by mouth daily     • metroNIDAZOLE (METROGEL) 0 75 % gel Apply once daily to face (Patient not taking: No sig reported) 45 g 4     No current facility-administered medications for this visit      _______________________________________________________________________  Review of Systems   Constitutional: Negative for activity change, appetite change, chills, fatigue, fever and unexpected weight change  HENT: Negative for congestion, ear discharge, ear pain, nosebleeds, postnasal drip, rhinorrhea, sinus pressure, sinus pain, sneezing, sore throat and voice change  Eyes: Negative for pain, redness and visual disturbance  Respiratory: Negative for cough, chest tightness, shortness of breath and wheezing  Cardiovascular: Negative for chest pain and palpitations  Gastrointestinal: Negative for abdominal distention, abdominal pain, constipation, diarrhea, nausea and vomiting  Endocrine: Negative  Genitourinary: Positive for flank pain  Negative for difficulty urinating, dysuria, frequency, hematuria and urgency  Left lateral flank pain  Mild tenderness around area  Patient has been using a heating pad at the site daily since Saturday  Musculoskeletal: Negative for arthralgias and myalgias  Skin: Negative  Allergic/Immunologic: Negative  Neurological: Negative  Hematological: Negative  Psychiatric/Behavioral: Negative  Objective:  Vitals:    10/27/22 1739   BP: 130/76   BP Location: Left arm   Patient Position: Sitting   Cuff Size: Large   Pulse: 89   Resp: 18   Temp: (!) 97 1 °F (36 2 °C)   TempSrc: Temporal   SpO2: 97%   Weight: 104 kg (229 lb)   Height: 5' 8" (1 727 m)     Body mass index is 34 82 kg/m²  Physical Exam  Vitals and nursing note reviewed  Constitutional:       Appearance: Normal appearance  He is well-developed  He is obese  HENT:      Head: Normocephalic and atraumatic  Right Ear: Tympanic membrane, ear canal and external ear normal       Left Ear: Tympanic membrane, ear canal and external ear normal       Nose: Nose normal  No congestion or rhinorrhea  Mouth/Throat:      Mouth: Mucous membranes are moist       Pharynx: No oropharyngeal exudate or posterior oropharyngeal erythema     Eyes:      Extraocular Movements: Extraocular movements intact  Conjunctiva/sclera: Conjunctivae normal       Pupils: Pupils are equal, round, and reactive to light  Cardiovascular:      Rate and Rhythm: Normal rate and regular rhythm  Pulses: Normal pulses  Heart sounds: Normal heart sounds  No murmur heard  Pulmonary:      Effort: Pulmonary effort is normal       Breath sounds: Normal breath sounds  Abdominal:      General: Bowel sounds are normal  There is no distension  Palpations: Abdomen is soft  There is no mass  Tenderness: There is abdominal tenderness  There is left CVA tenderness  There is no right CVA tenderness, guarding or rebound  Hernia: No hernia is present  Musculoskeletal:         General: Normal range of motion  Cervical back: Normal range of motion  Skin:     General: Skin is warm  Capillary Refill: Capillary refill takes less than 2 seconds  Neurological:      General: No focal deficit present  Mental Status: He is alert and oriented to person, place, and time     Psychiatric:         Mood and Affect: Mood normal          Behavior: Behavior normal

## 2022-10-27 NOTE — ASSESSMENT & PLAN NOTE
Patient ordered for ultrasound of the kidneys and bladder at this time for evaluation for kidney stones

## 2022-10-27 NOTE — ASSESSMENT & PLAN NOTE
Patient may take ibuprofen q 6 hours p r n  As needed for pain  Rice therapy encouraged  Chest x-ray ordered for further evaluation

## 2022-10-27 NOTE — PATIENT INSTRUCTIONS
Costochondritis   WHAT YOU NEED TO KNOW:   What is costochondritis? Costochondritis is a condition that causes pain in the cartilage that connect your ribs to your sternum (breastbone)  Cartilage is the tough, bendable tissue that protects your bones  What causes costochondritis? The cause of costochondritis may be unknown, or it may be caused by any of the following:  Chest injury:  An injury to your chest may cause costochondritis  Strain:  Activities that strain your chest wall muscles can lead to costochondritis  This includes hard coughing  Strain can also occur while you are playing sports with repeated arm movements, such as rowing, weightlifting, and volleyball  Infection:  Lung or chest infections can increase your risk of costochondritis  Inflammatory diseases:  Diseases that cause swelling around your joints, such as rheumatoid arthritis, increase your risk of costochondritis  What are the signs and symptoms of costochondritis? Costochondritis causes pain in the area where your sternum joins with your ribs  The pain may come and go, and may get worse over time  The pain may be sharp, or dull and aching  It may be painful to touch your chest  The pain may spread to your back, abdomen, or down your arm  It may get worse when you move, breathe deeply, or push or lift an object  The pain may make it hard for you to sleep or do your usual activities  How is costochondritis diagnosed? Your healthcare provider will ask you about your signs and symptoms  He will also do a physical exam  He will touch your chest and may move your arms to see if this causes pain  How is costochondritis treated? Costochondritis pain may go away without treatment, usually within a year  Your treatment depends on the cause of your costochondritis  You may need any of the following:  Acetaminophen: This medicine decreases pain  Acetaminophen is available without a doctor's order   Ask how much to take and how often to take it  Follow directions  Acetaminophen can cause liver damage if not taken correctly  NSAIDs , such as ibuprofen, help decrease swelling, pain, and fever  This medicine is available with or without a doctor's order  NSAIDs can cause stomach bleeding or kidney problems in certain people  If you take blood thinner medicine, always ask if NSAIDs are safe for you  Always read the medicine label and follow directions  Do not give these medicines to children under 10months of age without direction from your child's healthcare provider  What can I do to help decrease the pain caused by costochondritis? Rest:  You may need to rest and avoid painful movements and activities  Do not carry objects, such as a purse or backpack, if this causes pain  Avoid activities such as weightlifting until your pain decreases or goes away  Ask your healthcare provider which activities are best for you to do while you recover  Heat:  Heat helps decrease pain in some patients  Apply heat on the area for 20 to 30 minutes every 2 hours for as many days as directed  Ice:  Ice helps decrease swelling and pain  Ice may also help prevent tissue damage  Use an ice pack, or put crushed ice in a plastic bag  Cover it with a towel and place it on the painful area for 15 to 20 minutes every hour or as directed  Stretching exercises:  Gentle stretching may help your symptoms   a doorway and put your hands on the door frame at the level of your ears or shoulders  Take 1 step forward and gently stretch your chest  Try this with your hands higher up on the doorway  When should I contact my healthcare provider? You have a fever  The painful areas of your chest look swollen, red, and feel warm to the touch  You cannot sleep because of the pain  You have questions or concerns about your condition or care  CARE AGREEMENT:   You have the right to help plan your care   Learn about your health condition and how it may be treated  Discuss treatment options with your healthcare providers to decide what care you want to receive  You always have the right to refuse treatment  The above information is an  only  It is not intended as medical advice for individual conditions or treatments  Talk to your doctor, nurse or pharmacist before following any medical regimen to see if it is safe and effective for you  © Copyright [employer] 2022 Information is for End User's use only and may not be sold, redistributed or otherwise used for commercial purposes  All illustrations and images included in CareNotes® are the copyrighted property of [employer]  or [address] [place] [address]:   What is a kidney stone? Kidney stones form in the urinary system when the water and waste in your urine are out of balance  When this happens, certain types of waste crystals separate from the urine  The crystals build up and form kidney stones  Kidney stones can be made of uric acid, calcium, phosphate, or oxalate crystals  You may have more than one kidney stone  What increases my risk for kidney stones? Not drinking enough liquids (especially water) each day    Having urinary tract infections often    Too much of certain foods, such as meat, salt, nuts, and chocolate    Obesity    Certain medicines, such as diuretics, steroids, and antacids    A family history of kidney stones    Being born with a kidney or bowel disorder    What are the signs and symptoms of kidney stones? Pain in the middle of your back that moves across to your side or that may spread to your groin    Nausea and vomiting    Urge to urinate often, burning feeling when you urinate, or pink or red urine    Tenderness in your lower back, side, or stomach    How are kidney stones diagnosed? Your healthcare provider will ask about your health and usual foods   He or she may refer you to a urologist  Toya Kincaid may need tests to find out what type of kidney stones you have  Tests can show the size of your kidney stones and where they are in your urinary system  You may need more than one of the following:  Urine tests  may show if you have blood in your urine  They may also show high amounts of the substances that form kidney stones, such as uric acid  Blood tests  show how well your kidneys are working  They may also be used to check the levels of calcium or uric acid in your blood  X-ray or ultrasound pictures  may be taken of your kidneys, bladder, and ureters  You may be given contrast liquid before an x-ray to help these show up better in the pictures  You may need to have more than one x-ray  Tell the healthcare provider if you have ever had an allergic reaction to contrast liquid  How are kidney stones treated? NSAIDs , such as ibuprofen, help decrease swelling, pain, and fever  This medicine is available with or without a doctor's order  NSAIDs can cause stomach bleeding or kidney problems in certain people  If you take blood thinner medicine, always ask your healthcare provider if NSAIDs are safe for you  Always read the medicine label and follow directions  Acetaminophen  decreases pain and fever  It is available without a doctor's order  Ask how much to take and how often to take it  Follow directions  Read the labels of all other medicines you are using to see if they also contain acetaminophen, or ask your doctor or pharmacist  Acetaminophen can cause liver damage if not taken correctly  Do not use more than 4 grams (4,000 milligrams) total of acetaminophen in one day  Prescription pain medicine  may be given  Ask your healthcare provider how to take this medicine safely  Some prescription pain medicines contain acetaminophen  Do not take other medicines that contain acetaminophen without talking to your healthcare provider  Too much acetaminophen may cause liver damage   Prescription pain medicine may cause constipation  Ask your healthcare provider how to prevent or treat constipation  Medicines  to balance your electrolytes may be needed  A procedure or surgery  to remove the kidney stones may be needed if they do not pass on their own  Your treatment will depend on the size and location of your kidney stones  What can I do to manage kidney stones? Drink more liquids  Your healthcare provider may tell you to drink at least 8 to 12 (eight-ounce) cups of liquids each day  This helps flush out the kidney stones when you urinate  Water is the best liquid to drink  Strain your urine every time you go to the bathroom  Urinate through a strainer or a piece of thin cloth to catch the stones  Take the stones to your healthcare provider so they can be sent to the lab for tests  This will help your healthcare providers plan the best treatment for you  Ask if you should avoid any foods  You may need to limit oxalate  Oxalate is a chemical found in some plant foods  The most common type of kidney stone is made up of crystals that contain calcium and oxalate  Your healthcare provider or dietitian may recommend that you limit oxalate if you get this type of kidney stone often  You may need to limit how much sodium (salt) or protein you eat  Ask for information about the best foods for you  Be physically active as directed  Your stones may pass more easily if you stay active  Physical activity can also help you manage your weight  Ask about the best activities for you  When should I seek immediate care? You are vomiting and it is not relieved with medicine  When should I call my doctor? You have a fever  You have trouble urinating  You see blood in your urine  You have severe pain  You have any questions or concerns about your condition or care  CARE AGREEMENT:   You have the right to help plan your care  Learn about your health condition and how it may be treated  Discuss treatment options with your healthcare providers to decide what care you want to receive  You always have the right to refuse treatment  The above information is an  only  It is not intended as medical advice for individual conditions or treatments  Talk to your doctor, nurse or pharmacist before following any medical regimen to see if it is safe and effective for you  © Copyright [employer] 2022 Information is for End User's use only and may not be sold, redistributed or otherwise used for commercial purposes  All illustrations and images included in CareNotes® are the copyrighted property of [employer] [facility] [employer]  or Melina Vaughn   Flank Pain   WHAT YOU NEED TO KNOW:   Flank pain is felt in the area below your ribcage and above your hip bones, often in the lower back  Your pain may be dull or so severe that you cannot get comfortable  The pain may stay in one area or radiate to another area  It may worsen and lighten in waves  Flank pain is often a sign of problems with your urinary tract, such as a kidney stone or infection  DISCHARGE INSTRUCTIONS:   Return to the emergency department if:   You have a fever  Your heart is fluttering or jumping  You see blood in your urine  Your pain radiates into your lower abdomen and genital area  You have intense pain in your low back next to your spine  You are much more tired than usual and have no desire to eat  You have a headache and your muscles jerk  Contact your healthcare provider if:   You have an upset stomach and are vomiting  You have to urinate more often, and with urgency  Your pain worsens or does not improve, and you cannot get comfortable  You pass a stone when you urinate  You have questions or concerns about your condition or care  Medicines: The following medicines may be ordered for you:  Pain medicine  may help decrease or relieve your pain   Do not wait until the pain is severe before you take your medicine  Antibiotics  may help treat a urinary tract infection caused by bacteria  Take your medicine as directed  Contact your healthcare provider if you think your medicine is not helping or if you have side effects  Tell him of her if you are allergic to any medicine  Keep a list of the medicines, vitamins, and herbs you take  Include the amounts, and when and why you take them  Bring the list or the pill bottles to follow-up visits  Carry your medicine list with you in case of an emergency  Follow up with your healthcare provider in 1 to 2 days or as directed:  Write down your questions so you remember to ask them during your visits  © Copyright [employer] 2022 Information is for End User's use only and may not be sold, redistributed or otherwise used for commercial purposes  All illustrations and images included in CareNotes® are the copyrighted property of [employer]  or Melina Delgado  The above information is an  only  It is not intended as medical advice for individual conditions or treatments  Talk to your doctor, nurse or pharmacist before following any medical regimen to see if it is safe and effective for you  WDL

## 2022-10-28 ENCOUNTER — TELEPHONE (OUTPATIENT)
Dept: INTERNAL MEDICINE CLINIC | Facility: CLINIC | Age: 47
End: 2022-10-28

## 2022-10-28 ENCOUNTER — HOSPITAL ENCOUNTER (OUTPATIENT)
Dept: RADIOLOGY | Facility: HOSPITAL | Age: 47
Discharge: HOME | End: 2022-10-28
Payer: COMMERCIAL

## 2022-10-28 DIAGNOSIS — M94.0 COSTAL CHONDRITIS: ICD-10-CM

## 2022-10-28 PROCEDURE — 71046 X-RAY EXAM CHEST 2 VIEWS: CPT

## 2022-10-28 NOTE — TELEPHONE ENCOUNTER
Pt saw Alicia Loyola for back pain yesterday - He prescribed Ibuprofen for him  But when he went to the pharmacy to pick it up they told him to check with you due to him being on lisinopril, that there is a contraindication with that medicine      Is it ok if he takes it

## 2022-11-02 LAB
ALBUMIN SERPL-MCNC: 4.3 G/DL (ref 4–5)
ALBUMIN/GLOB SERPL: 1.7 {RATIO} (ref 1.2–2.2)
ALP SERPL-CCNC: 65 IU/L (ref 44–121)
ALT SERPL-CCNC: 28 IU/L (ref 0–44)
AST SERPL-CCNC: 19 IU/L (ref 0–40)
BASOPHILS # BLD AUTO: 0 X10E3/UL (ref 0–0.2)
BASOPHILS NFR BLD AUTO: 1 %
BILIRUB SERPL-MCNC: 0.5 MG/DL (ref 0–1.2)
BUN SERPL-MCNC: 14 MG/DL (ref 6–24)
BUN/CREAT SERPL: 15 (ref 9–20)
CALCIUM SERPL-MCNC: 9.2 MG/DL (ref 8.7–10.2)
CHLORIDE SERPL-SCNC: 105 MMOL/L (ref 96–106)
CO2 SERPL-SCNC: 25 MMOL/L (ref 20–29)
CREAT SERPL-MCNC: 0.94 MG/DL (ref 0.76–1.27)
EGFR: 99 ML/MIN/1.73
EOSINOPHIL # BLD AUTO: 0.1 X10E3/UL (ref 0–0.4)
EOSINOPHIL NFR BLD AUTO: 3 %
ERYTHROCYTE [DISTWIDTH] IN BLOOD BY AUTOMATED COUNT: 12.7 % (ref 11.6–15.4)
GLOBULIN SER-MCNC: 2.5 G/DL (ref 1.5–4.5)
GLUCOSE SERPL-MCNC: 104 MG/DL (ref 70–99)
HCT VFR BLD AUTO: 44.9 % (ref 37.5–51)
HGB BLD-MCNC: 15.6 G/DL (ref 13–17.7)
IMM GRANULOCYTES # BLD: 0.1 X10E3/UL (ref 0–0.1)
IMM GRANULOCYTES NFR BLD: 1 %
LYMPHOCYTES # BLD AUTO: 1.1 X10E3/UL (ref 0.7–3.1)
LYMPHOCYTES NFR BLD AUTO: 24 %
MCH RBC QN AUTO: 31 PG (ref 26.6–33)
MCHC RBC AUTO-ENTMCNC: 34.7 G/DL (ref 31.5–35.7)
MCV RBC AUTO: 89 FL (ref 79–97)
MONOCYTES # BLD AUTO: 0.4 X10E3/UL (ref 0.1–0.9)
MONOCYTES NFR BLD AUTO: 10 %
NEUTROPHILS # BLD AUTO: 2.8 X10E3/UL (ref 1.4–7)
NEUTROPHILS NFR BLD AUTO: 61 %
PLATELET # BLD AUTO: 124 X10E3/UL (ref 150–450)
POTASSIUM SERPL-SCNC: 4 MMOL/L (ref 3.5–5.2)
PROT SERPL-MCNC: 6.8 G/DL (ref 6–8.5)
RBC # BLD AUTO: 5.03 X10E6/UL (ref 4.14–5.8)
SODIUM SERPL-SCNC: 141 MMOL/L (ref 134–144)
WBC # BLD AUTO: 4.5 X10E3/UL (ref 3.4–10.8)

## 2022-11-12 LAB
CREAT ?TM UR-SCNC: 187.6 UMOL/L
EXT PROTEIN URINE: 16
PROT/CREAT UR: 85 MG/G{CREAT}

## 2022-11-17 ENCOUNTER — OFFICE VISIT (OUTPATIENT)
Dept: RHEUMATOLOGY | Facility: CLINIC | Age: 47
End: 2022-11-17

## 2022-11-17 VITALS — SYSTOLIC BLOOD PRESSURE: 162 MMHG | DIASTOLIC BLOOD PRESSURE: 83 MMHG | HEART RATE: 87 BPM | TEMPERATURE: 98.4 F

## 2022-11-17 DIAGNOSIS — M32.9 SYSTEMIC LUPUS ERYTHEMATOSUS, UNSPECIFIED SLE TYPE, UNSPECIFIED ORGAN INVOLVEMENT STATUS (HCC): Primary | ICD-10-CM

## 2022-11-17 DIAGNOSIS — Z79.899 LONG-TERM USE OF PLAQUENIL: ICD-10-CM

## 2022-11-17 DIAGNOSIS — L71.9 ROSACEA: ICD-10-CM

## 2022-11-17 DIAGNOSIS — M25.50 DIFFUSE ARTHRALGIA: ICD-10-CM

## 2022-11-17 RX ORDER — HYDROXYCHLOROQUINE SULFATE 200 MG/1
200 TABLET, FILM COATED ORAL 2 TIMES DAILY WITH MEALS
Qty: 180 TABLET | Refills: 1 | Status: SHIPPED | OUTPATIENT
Start: 2022-11-17 | End: 2023-05-16

## 2022-11-17 NOTE — PROGRESS NOTES
Assessment and Plan:   Mr Monika Munroe is a 49-year-old male with history significant for possible systemic lupus erythematosus and rosacea who presents for a follow-up  He is currently not on DMARDs  # Systemic lupus erythematosus (diagnosed based on a positive MURRAY 1:640 homogeneous pattern, elevated double-stranded DNA antibody, elevated antiphospholipid antibodies, thrombocytopenia, possible malar rash and ?inflammatory arthritis)  - Davee Schaumann presents today for a follow-up to re-evaluate the possibility of systemic lupus erythematosus which was diagnosed approximately 15 years ago by Dr Samia Velasquez but as patient was hesitant to proceed with treatment at that time there was no continued follow-up  Upon re-evaluating I do agree with the diagnosis of systemic lupus erythematosus based on the criteria as mentioned  In regards to the facial rash although this has characteristics suggestive of rosacea I also question if it may be a cutaneous manifestation of lupus  Additionally with the arthralgias I suspect this is more related to 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/osteoarthritis as opposed to an inflammatory arthritis  As the next steps in management I recommend starting hydroxychloroquine 200 mg twice daily to see if this helps with the skin rash and joint pains as well as to decrease risk for future complications of the underlying autoimmune disease  I reviewed the side effects with him including but not limited to the need for a baseline and annual eye exam   In the meanwhile he may continue with Tylenol as needed to manage the arthralgias  - He will update the lupus monitoring blood work before the follow-up visit and also repeat the antiphospholipid antibodies to assess for persistent positivity        Plan:  Diagnoses and all orders for this visit:    Systemic lupus erythematosus, unspecified SLE type, unspecified organ involvement status ([facility] )  - hydroxychloroquine (PLAQUENIL) 200 mg tablet; Take 1 tablet (200 mg total) by mouth 2 (two) times a day with meals  -     CBC and differential; Future  -     Comprehensive metabolic panel; Future  -     C-reactive protein; Future  -     Sedimentation rate, automated; Future  -     C4 complement; Future  -     C3 complement; Future  -     Anti-DNA antibody, double-stranded; Future  -     Urinalysis with microscopic  -     Protein / creatinine ratio, urine  -     Beta-2 glycoprotein antibodies; Future  -     Cardiolipin antibody; Future  -     Lupus anticoagulant; Future  -     Cyclic citrul peptide antibody, IgG; Future  -     Thyroid Antibodies Panel; Future    Long-term use of Plaquenil    Rosacea    Diffuse arthralgia      Activities as tolerated  Exercise: try to maintain a low impact exercise regimen as much as possible  Walk for 30 minutes a day for at least 3 days a week  Continue other medications as prescribed by PCP and other specialists  RTC in 4 months  HPI     INITIAL VISIT NOTE (7/2022):  Mr Ruth Nelson is a 38-year-old male with history significant for possible systemic lupus erythematosus and rosacea who presents for an evaluation  He is currently not on DMARDs  He is referred by Dr Kody Em for a rheumatology consult  Patient reports he was initially evaluated by Rheumatology approximately 15 years ago after he was found to have a positive MURRAY (I am assuming) which was detected in view of a facial rash and body pain he was experiencing  He was evaluated by Dr Vladimir Orellana and diagnosed with systemic lupus erythematosus  There was a discussion about starting him on hydroxychloroquine but patient opted not to do this due to concerns for potential side effects  He reports due to a change in insurance he then had to see Dr Ant Gilbert but did not continue long-term follow-up    He is unsure of what Dr Angie Royal diagnosis or recommendations were as he was only seen for 2 office visits  He is presenting today to re-evaluate the diagnosis  He reports chronic joint pains for more than 15 years now which has affected his elbows, knees and feet for the most part  He denies joint swelling  He experiences morning stiffness affecting him diffusely which can take about 30 minutes to improve  He takes Tylenol infrequently as needed which does help with the body pain  Advil does not help with joint pains  He has also noticed an area of swelling above his left elbow joint which occasionally bothers him  He had an elbow x-ray done which was normal   He reports a chronic facial rash affecting his nose and forehead for which he was recently seen by dermatology and diagnosed with rosacea  He was prescribed metronidazole ointment but he has not started this yet  In the past he mentions Rheumatology was concerned about a malar rash  He reports that this rash comes and goes spontaneously  He reports chronic dry eyes and dry mouth  He denies fevers, unintentional weight loss, focal alopecia, inflammatory eye disease, psoriasis, photosensitivity, mouth/nose ulcers, swollen glands, pleuritic chest pain, inflammatory bowel disease, blood clots, Raynaud's or a family history of autoimmune disease  Of note 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  11/17/2022:  Patient presents for a follow-up today  I reviewed the ultrasound of the left elbow which showed mild thickening and partial-thickness tear at the common extensor tendon origin suggestive of lateral epicondylitis  The blood work shows a positive MURRAY 1:640 homogeneous pattern with an elevated double-stranded DNA antibody of 56  The platelet count was slightly low at 140  An SSB antibody was minimally elevated at 1 6  An anti cardiolipin IgG antibody was elevated at 145 with an anti beta 2 glycoprotein IgG antibody elevated at 120    The remainder of the CBC, CMP, hepatitis panel, urinalysis, urine protein creatinine ratio, rest of the MURRAY specificity, C3, C4, rheumatoid factor, ESR and C-reactive protein were normal   A lupus anticoagulant and anticentromere antibodies are still pending  He does not report any new complaints today  The following portions of the patient's history were reviewed and updated as appropriate: allergies, current medications, past family history, past medical history, past social history, past surgical history and problem list       Review of Systems  Constitutional: Negative for weight change, fevers, chills, night sweats, fatigue  ENT/Mouth: Negative for hearing changes, ear pain, nasal congestion, sinus pain, hoarseness, sore throat, rhinorrhea, swallowing difficulty  Eyes: Negative for pain, redness, discharge, vision changes  Cardiovascular: Negative for chest pain, SOB, palpitations  Respiratory: Negative for cough, sputum, wheezing, dyspnea  Gastrointestinal: Negative for nausea, vomiting, diarrhea, constipation, pain, heartburn  Genitourinary: Negative for dysuria, urinary frequency, hematuria  Musculoskeletal: As per HPI  Skin: Negative for color changes  Positive for skin rash  Neuro: Negative for weakness, numbness, tingling, loss of consciousness  Psych: Negative for anxiety, depression  Heme/Lymph: Negative for easy bruising, bleeding, lymphadenopathy          Past Medical History:   Diagnosis Date   • Allergic rhinitis    • BMI 34 0-34 9,adult 3/7/2022   • BMI 35 0-35 9,adult 12/3/2021   • BMI 36 0-36 9,adult 08/31/2021   • Colon polyps 12/3/2021   • Constipation    • Elevated liver enzymes    • Essential hypertension 02/27/2021   • Fatigue    • Gastric polyps     history of   • Gastroesophageal reflux disease with esophagitis without hemorrhage 12/3/2021   • GERD (gastroesophageal reflux disease)    • Hyperglycemia 02/27/2021   • Hypertension    • Lupus ([facility] )    • Mixed hyperlipidemia 02/27/2021   • Obesity    • Rosacea • Rosacea 3/7/2022   • Seizures ([facility] )     6year old sport injury only-no recurrance   • Skin lesion 08/31/2021   • SLE (systemic lupus erythematosus related syndrome) ([facility] ) 02/27/2021   • Systemic lupus erythematosus ([facility] )    • Thrombocytopenia ([facility] ) 02/27/2021   • Vitamin D deficiency 02/27/2021       Past Surgical History:   Procedure Laterality Date   • COLONOSCOPY  01/12/2018    by Dr Monika Melendez; needs f/u 1/2023    • COLONOSCOPY     • EGD     • SKIN BIOPSY         Social History     Socioeconomic History   • Marital status: /Civil Union     Spouse name: Not on file   • Number of children: Not on file   • Years of education: Not on file   • Highest education level: Not on file   Occupational History   • Not on file   Tobacco Use   • Smoking status: Light Smoker     Types: Cigars   • Smokeless tobacco: Never   • Tobacco comments:     once a month-cigar   Vaping Use   • Vaping Use: Never used   Substance and Sexual Activity   • Alcohol use: Yes     Comment: Occasional - As per [employer]-wine or beer   • Drug use: Never   • Sexual activity: Yes   Other Topics Concern   • Not on file   Social History Narrative    Annual eye exam: Advise    Annual dental checkup: Sees q6months    - As per [employer]     Social Determinants of Health     Financial Resource Strain: Not on file   Food Insecurity: Not on file   Transportation Needs: Not on file   Physical Activity: Not on file   Stress: Not on file   Social Connections: Not on file   Intimate Partner Violence: Not on file   Housing Stability: Not on file       Family History   Problem Relation Age of Onset   • Dementia Mother    • Colon cancer Father    • Cancer Father         colon   • Lung cancer Paternal Grandfather    • Colon cancer Paternal Aunt    • Skin cancer Paternal Uncle    • Colon cancer Maternal Aunt        Allergies   Allergen Reactions   • Omeprazole GI Intolerance     He developed constipation       Current Outpatient Medications:   • hydroxychloroquine (PLAQUENIL) 200 mg tablet, Take 1 tablet (200 mg total) by mouth 2 (two) times a day with meals, Disp: 180 tablet, Rfl: 1  •  aspirin 81 mg chewable tablet, Chew 81 mg daily, Disp: , Rfl:   •  atenolol (TENORMIN) 50 mg tablet, Take 1 tablet (50 mg total) by mouth daily, Disp: 90 tablet, Rfl: 1  •  famotidine (PEPCID) 20 mg tablet, Take 1 tablet (20 mg total) by mouth 2 (two) times a day, Disp: 60 tablet, Rfl: 3  •  ibuprofen (MOTRIN) 600 mg tablet, Take 1 tablet (600 mg total) by mouth every 6 (six) hours as needed for mild pain or moderate pain, Disp: 30 tablet, Rfl: 0  •  lisinopril (ZESTRIL) 40 mg tablet, Take 1 tablet (40 mg total) by mouth in the morning , Disp: 90 tablet, Rfl: 1  •  metroNIDAZOLE (METROGEL) 0 75 % gel, Apply once daily to face (Patient not taking: No sig reported), Disp: 45 g, Rfl: 4  •  multivitamin (THERAGRAN) TABS, Take 2 tablets by mouth daily, Disp: , Rfl:   •  Vitamin D, Cholecalciferol, 50 MCG (2000 UT) CAPS, Take 1 capsule by mouth daily, Disp: , Rfl:       Objective:    Vitals:    11/17/22 1457   BP: 162/83   Pulse: 87   Temp: 98 4 °F (36 9 °C)       Physical Exam  General: Well appearing, well nourished, in no distress  Oriented x 3, normal mood and affect  Ambulating without difficulty  Skin: Good turgor, no unusual bruising or prominent lesions  Facial rash with telangiectasias noted on the tip of his nose and central forehead, this appears consistent with rosacea  No malar rash  Hair:  Male pattern baldness  Nails: Normal color, no deformities  HEENT:  Head: Normocephalic, atraumatic  Eyes: Conjunctiva clear, sclera non-icteric, EOM intact  Extremities: No amputations or deformities, cyanosis, edema  Musculoskeletal:   There is no peripheral joint soft tissue swelling or tenderness noted  Neurologic: Alert and oriented  No focal neurological deficits appreciated  Psychiatric: Normal mood and affect  HUGH Fierro    Rheumatology

## 2022-11-29 ENCOUNTER — RA CDI HCC (OUTPATIENT)
Dept: OTHER | Facility: HOSPITAL | Age: 47
End: 2022-11-29

## 2022-11-29 NOTE — PROGRESS NOTES
[facility]  coding opportunities       Chart reviewed, no opportunity found: CHART REVIEWED, NO OPPORTUNITY FOUND        Patients Insurance        Commercial Insurance: [employer]

## 2022-12-05 ENCOUNTER — TELEPHONE (OUTPATIENT)
Dept: INTERNAL MEDICINE CLINIC | Facility: CLINIC | Age: 47
End: 2022-12-05

## 2022-12-05 DIAGNOSIS — I10 ESSENTIAL HYPERTENSION: ICD-10-CM

## 2022-12-05 RX ORDER — LISINOPRIL 40 MG/1
40 TABLET ORAL DAILY
Qty: 90 TABLET | Refills: 2 | Status: SHIPPED | OUTPATIENT
Start: 2022-12-05

## 2022-12-06 ENCOUNTER — OFFICE VISIT (OUTPATIENT)
Dept: INTERNAL MEDICINE CLINIC | Facility: CLINIC | Age: 47
End: 2022-12-06

## 2022-12-06 VITALS
OXYGEN SATURATION: 96 % | WEIGHT: 229 LBS | RESPIRATION RATE: 18 BRPM | TEMPERATURE: 98.8 F | BODY MASS INDEX: 36.8 KG/M2 | DIASTOLIC BLOOD PRESSURE: 80 MMHG | SYSTOLIC BLOOD PRESSURE: 130 MMHG | HEART RATE: 87 BPM | HEIGHT: 66 IN

## 2022-12-06 DIAGNOSIS — N52.9 ERECTILE DYSFUNCTION, UNSPECIFIED ERECTILE DYSFUNCTION TYPE: ICD-10-CM

## 2022-12-06 DIAGNOSIS — E78.2 MIXED HYPERLIPIDEMIA: ICD-10-CM

## 2022-12-06 DIAGNOSIS — R35.1 NOCTURIA: ICD-10-CM

## 2022-12-06 DIAGNOSIS — M32.9 SLE (SYSTEMIC LUPUS ERYTHEMATOSUS RELATED SYNDROME) (HCC): ICD-10-CM

## 2022-12-06 DIAGNOSIS — I10 ESSENTIAL HYPERTENSION: ICD-10-CM

## 2022-12-06 DIAGNOSIS — Z00.00 ANNUAL PHYSICAL EXAM: Primary | ICD-10-CM

## 2022-12-06 DIAGNOSIS — R21 SKIN RASH: ICD-10-CM

## 2022-12-06 DIAGNOSIS — R73.9 HYPERGLYCEMIA: ICD-10-CM

## 2022-12-06 DIAGNOSIS — R74.01 ELEVATED ALT MEASUREMENT: ICD-10-CM

## 2022-12-06 DIAGNOSIS — D69.6 THROMBOCYTOPENIA (HCC): ICD-10-CM

## 2022-12-06 DIAGNOSIS — N28.9 RENAL LESION: ICD-10-CM

## 2022-12-06 DIAGNOSIS — Z12.5 SCREENING PSA (PROSTATE SPECIFIC ANTIGEN): ICD-10-CM

## 2022-12-06 DIAGNOSIS — K21.00 GASTROESOPHAGEAL REFLUX DISEASE WITH ESOPHAGITIS WITHOUT HEMORRHAGE: ICD-10-CM

## 2022-12-06 DIAGNOSIS — N40.0 ENLARGED PROSTATE: ICD-10-CM

## 2022-12-06 DIAGNOSIS — K63.5 POLYP OF COLON, UNSPECIFIED PART OF COLON, UNSPECIFIED TYPE: ICD-10-CM

## 2022-12-06 NOTE — PROGRESS NOTES
ADULT ANNUAL [address] [facility] INTERNAL MEDICINE    NAME: Marlin Schwab  AGE: 50 y o  SEX: male  : 1975     DATE: 2022     Assessment and Plan:     Problem List Items Addressed This Visit    None  Visit Diagnoses     Annual physical exam    -  Primary          Immunizations and preventive care screenings were discussed with patient today  Appropriate education was printed on patient's after visit summary  Discussed risks and benefits of prostate cancer screening  We discussed the controversial history of PSA screening for prostate cancer in the [place] as well as the risk of over detection and over treatment of prostate cancer by way of PSA screening  The patient understands that PSA blood testing is an imperfect way to screen for prostate cancer and that elevated PSA levels in the blood may also be caused by infection, inflammation, prostatic trauma or manipulation, urological procedures, or by benign prostatic enlargement  The role of the digital rectal examination in prostate cancer screening was also discussed and I discussed with him that there is large interobserver variability in the findings of digital rectal examination  Counseling:  · Alcohol/drug use: discussed moderation in alcohol intake, the recommendations for healthy alcohol use, and avoidance of illicit drug use  No follow-ups on file  Chief Complaint:     Chief Complaint   Patient presents with   • Annual Exam     Blood work results       History of Present Illness:     Adult Annual Physical   Patient here for a comprehensive physical exam  The patient reports problems - Nocturia and erectile dysfunction  Diet and Physical Activity  · Diet/Nutrition: well balanced diet  · Exercise: walking  Depression Screening  PHQ-2/9 Depression Screening         General Health  · Sleep: sleeps well     · Hearing: normal - bilateral   · Vision: no vision problems  · Dental: regular dental visits   Health  · Symptoms include: erectile dysfunction     Review of Systems:     Review of Systems   Constitutional: Negative for chills and fever  HENT: Negative for congestion, ear pain, hearing loss, nosebleeds, sinus pain, sore throat and trouble swallowing  Eyes: Negative for discharge, redness and visual disturbance  Respiratory: Negative for cough, chest tightness and shortness of breath  Cardiovascular: Negative for chest pain and palpitations  Gastrointestinal: Negative for abdominal pain, blood in stool, constipation, diarrhea, nausea and vomiting  Genitourinary: Negative for dysuria, flank pain, frequency and hematuria  Musculoskeletal: Positive for arthralgias  Negative for myalgias and neck pain  Skin: Positive for rash  Negative for color change  Neurological: Negative for dizziness, speech difficulty, weakness and headaches  Hematological: Does not bruise/bleed easily  Psychiatric/Behavioral: Negative for agitation and behavioral problems           Past Medical History:     Past Medical History:   Diagnosis Date   • Allergic rhinitis    • BMI 34 0-34 9,adult 3/7/2022   • BMI 35 0-35 9,adult 12/3/2021   • BMI 36 0-36 9,adult 08/31/2021   • Colon polyps 12/3/2021   • Constipation    • Elevated liver enzymes    • Essential hypertension 02/27/2021   • Fatigue    • Gastric polyps     history of   • Gastroesophageal reflux disease with esophagitis without hemorrhage 12/3/2021   • GERD (gastroesophageal reflux disease)    • Hyperglycemia 02/27/2021   • Hypertension    • Lupus ([facility] )    • Mixed hyperlipidemia 02/27/2021   • Obesity    • Rosacea    • Rosacea 3/7/2022   • Seizures ([facility] )     6year old sport injury only-no recurrance   • Skin lesion 08/31/2021   • SLE (systemic lupus erythematosus related syndrome) ([facility] ) 02/27/2021   • Systemic lupus erythematosus ([facility] )    • Thrombocytopenia ([facility] ) 02/27/2021   • Vitamin D deficiency 02/27/2021      Past Surgical History:     Past Surgical History:   Procedure Laterality Date   • COLONOSCOPY  01/12/2018    by Dr Corbin Wakefield; needs f/u 1/2023    • COLONOSCOPY     • EGD     • SKIN BIOPSY        Family History:     Family History   Problem Relation Age of Onset   • Dementia Mother    • Colon cancer Father    • Cancer Father         colon   • Lung cancer Paternal Grandfather    • Colon cancer Paternal Aunt    • Skin cancer Paternal Uncle    • Colon cancer Maternal Aunt       Social History:     Social History     Socioeconomic History   • Marital status: /Civil Union     Spouse name: None   • Number of children: None   • Years of education: None   • Highest education level: None   Occupational History   • None   Tobacco Use   • Smoking status: Light Smoker     Types: Cigars   • Smokeless tobacco: Never   • Tobacco comments:     once a month-cigar   Vaping Use   • Vaping Use: Never used   Substance and Sexual Activity   • Alcohol use: Yes     Comment: Occasional - As per [employer]-wine or beer   • Drug use: Never   • Sexual activity: Yes   Other Topics Concern   • None   Social History Narrative    Annual eye exam: Advise    Annual dental checkup: Sees q6months    - As per [employer]     Social Determinants of Health     Financial Resource Strain: Not on file   Food Insecurity: Not on file   Transportation Needs: Not on file   Physical Activity: Not on file   Stress: Not on file   Social Connections: Not on file   Intimate Partner Violence: Not on file   Housing Stability: Not on file      Current Medications:     Current Outpatient Medications   Medication Sig Dispense Refill   • aspirin 81 mg chewable tablet Chew 81 mg daily     • atenolol (TENORMIN) 50 mg tablet Take 1 tablet (50 mg total) by mouth daily 90 tablet 1   • famotidine (PEPCID) 20 mg tablet Take 1 tablet (20 mg total) by mouth 2 (two) times a day 60 tablet 3   • lisinopril (ZESTRIL) 40 mg tablet Take 1 tablet (40 mg total) by mouth daily 90 tablet 2   • multivitamin (THERAGRAN) TABS Take 2 tablets by mouth daily     • Vitamin D, Cholecalciferol, 50 MCG (2000 UT) CAPS Take 1 capsule by mouth daily     • hydroxychloroquine (PLAQUENIL) 200 mg tablet Take 1 tablet (200 mg total) by mouth 2 (two) times a day with meals (Patient not taking: Reported on 12/6/2022) 180 tablet 1   • ibuprofen (MOTRIN) 600 mg tablet Take 1 tablet (600 mg total) by mouth every 6 (six) hours as needed for mild pain or moderate pain (Patient not taking: Reported on 12/6/2022) 30 tablet 0   • metroNIDAZOLE (METROGEL) 0 75 % gel Apply once daily to face (Patient not taking: Reported on 8/25/2022) 45 g 4     No current facility-administered medications for this visit  Allergies: Allergies   Allergen Reactions   • Omeprazole GI Intolerance     He developed constipation      Physical Exam:     /76 (BP Location: Left arm, Patient Position: Sitting, Cuff Size: Adult)   Pulse 87   Temp 98 8 °F (37 1 °C) (Tympanic)   Resp 18   Ht 5' 6" (1 676 m)   Wt 104 kg (229 lb)   SpO2 96%   BMI 36 96 kg/m²     Physical Exam  Vitals and nursing note reviewed  Constitutional:       General: He is not in acute distress  Appearance: He is well-developed  He is obese  HENT:      Head: Normocephalic and atraumatic  Right Ear: Tympanic membrane, ear canal and external ear normal       Left Ear: Tympanic membrane, ear canal and external ear normal       Nose: Nose normal       Mouth/Throat:      Mouth: Mucous membranes are moist    Eyes:      General: No scleral icterus  Right eye: No discharge  Left eye: No discharge  Extraocular Movements: Extraocular movements intact  Conjunctiva/sclera: Conjunctivae normal       Pupils: Pupils are equal, round, and reactive to light  Cardiovascular:      Rate and Rhythm: Normal rate and regular rhythm  Heart sounds: No murmur heard    Pulmonary:      Effort: Pulmonary effort is normal  No respiratory distress  Breath sounds: Normal breath sounds  Abdominal:      Palpations: Abdomen is soft  Tenderness: There is no abdominal tenderness  Musculoskeletal:         General: No swelling  Cervical back: Neck supple  Skin:     General: Skin is warm and dry  Capillary Refill: Capillary refill takes less than 2 seconds  Neurological:      Mental Status: He is alert     Psychiatric:         Mood and Affect: Mood normal               Svetlana Romero MD  [facility]

## 2022-12-06 NOTE — PROGRESS NOTES
Assessment/Plan:    1  Annual physical exam    2  Essential hypertension  Assessment & Plan:  Blood pressure well controlled  Advised to continue present medication  Advised for low-salt diet  3  Enlarged prostate  Assessment & Plan:  Ultrasound October 2022 revealed prostatomegaly  He has a nocturia  Will refer to urologist     Orders:  -     PSA, Total Screen; Future  -     Ambulatory Referral to Urology; Future    4  Renal lesion  Assessment & Plan: On ultrasound has a 5 mm right renal lesion  Will  refer to urologist     Orders:  -     Ambulatory Referral to Urology; Future    5  Erectile dysfunction, unspecified erectile dysfunction type  Assessment & Plan:  Complaint of erectile dysfunction  Will check  testosterone level and refer to urologist with other urological complaints  Orders:  -     Testosterone, free, total; Future  -     Ambulatory Referral to Urology; Future    6  Gastroesophageal reflux disease with esophagitis without hemorrhage  Assessment & Plan:  Symptoms are well controlled on famotidine  Patient has been watching diet very closely  Orders:  -     Ambulatory referral for colonoscopy; Future    7  Hyperglycemia  Assessment & Plan:  His fasting blood sugar 106  Patient to watch diet for sugar and carbs intake and lose weight  8  Mixed hyperlipidemia  Assessment & Plan:  His last cholesterol was 152 triglycerides elevated to 359 LDL 70 HDL 25  Patient states he was taking fish oil but is concerned about affecting his platelet count so advised to discontinue  Advised for low-cholesterol, low sugar, low-carb diet  Advised to exercise lose weight  9  Nocturia  Assessment & Plan:  Comment of waking up at night 2-3 times for urination  Probably secondary to enlarged prostate ultrasound October 2022 revealed prostatomegaly  Referred to urologist     Orders:  -     Ambulatory Referral to Urology; Future    10   Elevated ALT measurement  Assessment & Plan:  Slightly elevated ALT with normal AST and other liver enzymes  Patient's  had wine night before blood test   Will observe and follow  11  [facility]  Assessment & Plan:  Platelet count 282 K stable  Normal WBC and hemoglobin   Will observe and follow  12  SLE (systemic lupus erythematosus related syndrome) ([facility] )  Assessment & Plan:  Patient has been seen and followed by rheumatologist   He was started on hydroxychloroquine but patient has not started yet as he needs eye examination prior to starting his hydroxychloroquine  I referred him to be seen by an ophthalmologist       13  Screening PSA (prostate specific antigen)  -     PSA, Total Screen; Future    14  Polyp of colon, unspecified part of colon, unspecified type  -     Ambulatory referral for colonoscopy; Future    15  BMI 36 0-36 9,adult  Assessment & Plan:  Patient  was advised to decrease portion size  Advised to decrease carb, sugar, cholesterol intake  Advised to exercise 3-5 times per week  Advised to lose weight  16  Skin rash  Assessment & Plan:  Has a rash on her face  Rosacea versus SLE  He was prescribed MetroGel cream by rheumatology but he has not started yet  Subjective: Patient presents for annual wellness exam as well as follow-up his medical problems  Patient ID: Bassam Cox is a 50 y o  male  HPI   78-year-old white male patient presents for annual wellness exam as well as follow-up his medical problems  He denies any chest pain, shortness of breath, pain in abdomen  Denies any cough, fever, chills  Denies any nausea vomiting diarrhea  He was seen by rheumatologist   He had ultrasound of the kidneys revealed a 5 mm right renal lesion  He has a prostatomegaly      The following portions of the patient's history were reviewed and updated as appropriate:     Past Medical History:  He has a past medical history of Allergic rhinitis, BMI 34 0-34 9,adult (3/7/2022), BMI 35 0-35 9,adult (12/3/2021), BMI 36 0-36 9,adult (08/31/2021), Colon polyps (12/3/2021), Constipation, Elevated ALT measurement (12/6/2022), Elevated liver enzymes, Enlarged prostate (12/6/2022), Erectile dysfunction (12/6/2022), Essential hypertension (02/27/2021), Fatigue, Gastric polyps, Gastroesophageal reflux disease with esophagitis without hemorrhage (12/3/2021), GERD (gastroesophageal reflux disease), Hyperglycemia (02/27/2021), Hypertension, Lupus ([place] [address] ), Mixed hyperlipidemia (02/27/2021), Nocturia (12/6/2022), Obesity, Renal lesion (12/6/2022), Rosacea, Rosacea (3/7/2022), Seizures ([place] [address] ), Skin lesion (08/31/2021), Skin rash (12/7/2022), SLE (systemic lupus erythematosus related syndrome) ([place] [address] ) (02/27/2021), Systemic lupus erythematosus ([place] [address] ), Thrombocytopenia ([place] [address] ) (02/27/2021), and Vitamin D deficiency (02/27/2021)  ,  _______________________________________________________________________  Past Surgical History:   has a past surgical history that includes Colonoscopy (01/12/2018); Colonoscopy; EGD; and Skin biopsy  ,  _______________________________________________________________________  Family History:  family history includes Cancer in his father; Colon cancer in his father, maternal aunt, and paternal aunt; Dementia in his mother; Lung cancer in his paternal grandfather; Skin cancer in his paternal uncle ,  _______________________________________________________________________  Social History:   reports that he has been smoking cigars  He has never used smokeless tobacco  He reports current alcohol use  He reports that he does not use drugs  ,  _______________________________________________________________________  Allergies:  is allergic to omeprazole     _______________________________________________________________________  Current Outpatient Medications   Medication Sig Dispense Refill   • aspirin 81 mg chewable tablet Chew 81 mg daily     • atenolol (TENORMIN) 50 mg tablet Take 1 tablet (50 mg total) by mouth daily 90 tablet 1   • famotidine (PEPCID) 20 mg tablet Take 1 tablet (20 mg total) by mouth 2 (two) times a day 60 tablet 3   • lisinopril (ZESTRIL) 40 mg tablet Take 1 tablet (40 mg total) by mouth daily 90 tablet 2   • multivitamin (THERAGRAN) TABS Take 2 tablets by mouth daily     • Vitamin D, Cholecalciferol, 50 MCG (2000 UT) CAPS Take 1 capsule by mouth daily     • hydroxychloroquine (PLAQUENIL) 200 mg tablet Take 1 tablet (200 mg total) by mouth 2 (two) times a day with meals (Patient not taking: Reported on 12/6/2022) 180 tablet 1   • ibuprofen (MOTRIN) 600 mg tablet Take 1 tablet (600 mg total) by mouth every 6 (six) hours as needed for mild pain or moderate pain (Patient not taking: Reported on 12/6/2022) 30 tablet 0   • metroNIDAZOLE (METROGEL) 0 75 % gel Apply once daily to face (Patient not taking: Reported on 8/25/2022) 45 g 4     No current facility-administered medications for this visit      _______________________________________________________________________  Review of Systems   Constitutional: Negative for chills, fatigue and fever  HENT: Negative for congestion, ear pain, hearing loss, nosebleeds, sinus pain, sore throat and trouble swallowing  Eyes: Negative for discharge, redness and visual disturbance  Respiratory: Negative for cough, chest tightness and shortness of breath  Cardiovascular: Negative for chest pain and palpitations  Gastrointestinal: Negative for abdominal pain, blood in stool, constipation, diarrhea, nausea and vomiting  Genitourinary: Negative for dysuria, flank pain, frequency and hematuria  Musculoskeletal: Positive for arthralgias ( He gets pain in the feet, elbows, knees  )  Negative for myalgias and neck pain  Skin: Positive for rash ( He has a rash on the face  )  Negative for color change  Neurological: Negative for dizziness, speech difficulty, weakness and headaches  Hematological: Does not bruise/bleed easily     Psychiatric/Behavioral: Negative for agitation and behavioral problems  Objective:  Vitals:    12/06/22 1539   BP: 130/80   BP Location: Left arm   Patient Position: Sitting   Cuff Size: Adult   Pulse: 87   Resp: 18   Temp: 98 8 °F (37 1 °C)   TempSrc: Tympanic   SpO2: 96%   Weight: 104 kg (229 lb)   Height: 5' 6" (1 676 m)     Body mass index is 36 96 kg/m²  Physical Exam  Vitals and nursing note reviewed  Constitutional:       General: He is not in acute distress  Appearance: Normal appearance  HENT:      Head: Normocephalic and atraumatic  Right Ear: Ear canal and external ear normal       Left Ear: Ear canal and external ear normal       Nose: Nose normal       Mouth/Throat:      Mouth: Mucous membranes are moist    Eyes:      General: No scleral icterus  Right eye: No discharge  Left eye: No discharge  Extraocular Movements: Extraocular movements intact  Conjunctiva/sclera: Conjunctivae normal    Cardiovascular:      Rate and Rhythm: Normal rate and regular rhythm  Pulses: Normal pulses  Heart sounds: No murmur heard  Pulmonary:      Effort: Pulmonary effort is normal  No respiratory distress  Breath sounds: Normal breath sounds  Abdominal:      General: Bowel sounds are normal       Palpations: Abdomen is soft  Tenderness: There is no abdominal tenderness  Musculoskeletal:         General: Normal range of motion  Cervical back: Normal range of motion and neck supple  No muscular tenderness  Right lower leg: No edema  Left lower leg: No edema  Skin:     General: Skin is warm  Findings: Rash ( He has a dry scaly slightly erythematous rash on both sides of the nose on maxillary area  ) present  Neurological:      General: No focal deficit present  Mental Status: He is alert and oriented to person, place, and time  Motor: No weakness        Coordination: Coordination normal    Psychiatric:         Mood and Affect: Mood normal  Behavior: Behavior normal

## 2022-12-06 NOTE — PATIENT INSTRUCTIONS
Wellness Visit for Adults   AMBULATORY CARE:   A wellness visit  is when you see your healthcare provider to get screened for health problems  Your healthcare provider will also give you advice on how to stay healthy  Write down your questions so you remember to ask them  Ask your healthcare provider how often you should have a wellness visit  What happens at a wellness visit:  Your healthcare provider will ask about your health, and your family history of health problems  This includes high blood pressure, heart disease, and cancer  He or she will ask if you have symptoms that concern you, if you smoke, and about your mood  You may also be asked about your intake of medicines, supplements, food, and alcohol  Any of the following may be done: Your weight  will be checked  Your height may also be checked so your body mass index (BMI) can be calculated  Your BMI shows if you are at a healthy weight  Your blood pressure  and heart rate will be checked  Your temperature may also be checked  Blood and urine tests  may be done  Blood tests may be done to check your cholesterol levels  Abnormal cholesterol levels increase your risk for heart disease and stroke  You may also need a blood or urine test to check for diabetes if you are at increased risk  Urine tests may be done to look for signs of an infection or kidney disease  A physical exam  includes checking your heartbeat and lungs with a stethoscope  Your healthcare provider may also check your skin to look for sun damage  Screening tests  may be recommended  A screening test is done to check for diseases that may not cause symptoms  The screening tests you may need depend on your age, gender, family history, and lifestyle habits  For example, colorectal screening may be recommended if you are 48years old or older  Screening tests you need if you are a woman:   A Pap smear  is used to screen for cervical cancer   Pap smears are usually done every 3 to 5 years depending on your age  You may need them more often if you have had abnormal Pap smear test results in the past  Ask your healthcare provider how often you should have a Pap smear  A mammogram  is an x-ray of your breasts to screen for breast cancer  Experts recommend mammograms every 2 years starting at age 48 years  You may need a mammogram at age 52 years or younger if you have an increased risk for breast cancer  Talk to your healthcare provider about when you should start having mammograms and how often you need them  Vaccines you may need:   Get an influenza vaccine  every year  The influenza vaccine protects you from the flu  Several types of viruses cause the flu  The viruses change over time, so new vaccines are made each year  Get a tetanus-diphtheria (Td) booster vaccine  every 10 years  This vaccine protects you against tetanus and diphtheria  Tetanus is a severe infection that may cause painful muscle spasms and lockjaw  Diphtheria is a severe bacterial infection that causes a thick covering in the back of your mouth and throat  Get a human papillomavirus (HPV) vaccine  if you are female and aged 23 to 32 or male 23 to 24 and never received it  This vaccine protects you from HPV infection  HPV is the most common infection spread by sexual contact  HPV may also cause vaginal, penile, and anal cancers  Get a pneumococcal vaccine  if you are aged 72 years or older  The pneumococcal vaccine is an injection given to protect you from pneumococcal disease  Pneumococcal disease is an infection caused by pneumococcal bacteria  The infection may cause pneumonia, meningitis, or an ear infection  Get a shingles vaccine  if you are 60 or older, even if you have had shingles before  The shingles vaccine is an injection to protect you from the varicella-zoster virus  This is the same virus that causes chickenpox   Shingles is a painful rash that develops in people who had chickenpox or have been exposed to the virus  How to eat healthy:  My Plate is a model for planning healthy meals  It shows the types and amounts of foods that should go on your plate  Fruits and vegetables make up about half of your plate, and grains and protein make up the other half  A serving of dairy is included on the side of your plate  The amount of calories and serving sizes you need depends on your age, gender, weight, and height  Examples of healthy foods are listed below:  Eat a variety of vegetables  such as dark green, red, and orange vegetables  You can also include canned vegetables low in sodium (salt) and frozen vegetables without added butter or sauces  Eat a variety of fresh fruits , canned fruit in 100% juice, frozen fruit, and dried fruit  Include whole grains  At least half of the grains you eat should be whole grains  Examples include whole-wheat bread, wheat pasta, brown rice, and whole-grain cereals such as oatmeal     Eat a variety of protein foods such as seafood (fish and shellfish), lean meat, and poultry without skin (turkey and chicken)  Examples of lean meats include pork leg, shoulder, or tenderloin, and beef round, sirloin, tenderloin, and extra lean ground beef  Other protein foods include eggs and egg substitutes, beans, peas, soy products, nuts, and seeds  Choose low-fat dairy products such as skim or 1% milk or low-fat yogurt, cheese, and cottage cheese  Limit unhealthy fats  such as butter, hard margarine, and shortening  Exercise:  Exercise at least 30 minutes per day on most days of the week  Some examples of exercise include walking, biking, dancing, and swimming  You can also fit in more physical activity by taking the stairs instead of the elevator or parking farther away from stores  Include muscle strengthening activities 2 days each week  Regular exercise provides many health benefits   It helps you manage your weight, and decreases your risk for type 2 diabetes, heart disease, stroke, and high blood pressure  Exercise can also help improve your mood  Ask your healthcare provider about the best exercise plan for you  General health and safety guidelines:   Do not smoke  Nicotine and other chemicals in cigarettes and cigars can cause lung damage  Ask your healthcare provider for information if you currently smoke and need help to quit  E-cigarettes or smokeless tobacco still contain nicotine  Talk to your healthcare provider before you use these products  Limit alcohol  A drink of alcohol is 12 ounces of beer, 5 ounces of wine, or 1½ ounces of liquor  Lose weight, if needed  Being overweight increases your risk of certain health conditions  These include heart disease, high blood pressure, type 2 diabetes, and certain types of cancer  Protect your skin  Do not sunbathe or use tanning beds  Use sunscreen with a SPF 15 or higher  Apply sunscreen at least 15 minutes before you go outside  Reapply sunscreen every 2 hours  Wear protective clothing, hats, and sunglasses when you are outside  Drive safely  Always wear your seatbelt  Make sure everyone in your car wears a seatbelt  A seatbelt can save your life if you are in an accident  Do not use your cell phone when you are driving  This could distract you and cause an accident  Pull over if you need to make a call or send a text message  Practice safe sex  Use latex condoms if are sexually active and have more than one partner  Your healthcare provider may recommend screening tests for sexually transmitted infections (STIs)  Wear helmets, lifejackets, and protective gear  Always wear a helmet when you ride a bike or motorcycle, go skiing, or play sports that could cause a head injury  Wear protective equipment when you play sports  Wear a lifejacket when you are on a boat or doing water sports      © Copyright [employer] 2022 Information is for End User's use only and may not be sold, redistributed or otherwise used for commercial purposes  All illustrations and images included in CareNotes® are the copyrighted property of [employer] Inc  or Melina Delgado  The above information is an  only  It is not intended as medical advice for individual conditions or treatments  Talk to your doctor, nurse or pharmacist before following any medical regimen to see if it is safe and effective for you  Patient was advised to continue present medications  discussed with the patient medications and laboratory data in detail  Follow-up with me in 3 months or as advised  If any blood test was ordered please do 1 week prior to next appointment unless advise to get earlier    If you have any questions please call the office [phone]

## 2022-12-07 PROBLEM — R21 SKIN RASH: Status: ACTIVE | Noted: 2022-12-07

## 2022-12-07 NOTE — ASSESSMENT & PLAN NOTE
Has a rash on her face  Rosacea versus SLE  He was prescribed MetroGel cream by rheumatology but he has not started yet

## 2022-12-07 NOTE — ASSESSMENT & PLAN NOTE
Complaint of erectile dysfunction  Will check  testosterone level and refer to urologist with other urological complaints

## 2022-12-07 NOTE — ASSESSMENT & PLAN NOTE
His last cholesterol was 152 triglycerides elevated to 359 LDL 70 HDL 25  Patient states he was taking fish oil but is concerned about affecting his platelet count so advised to discontinue  Advised for low-cholesterol, low sugar, low-carb diet  Advised to exercise lose weight

## 2022-12-07 NOTE — ASSESSMENT & PLAN NOTE
Patient has been seen and followed by rheumatologist   He was started on hydroxychloroquine but patient has not started yet as he needs eye examination prior to starting his hydroxychloroquine    I referred him to be seen by an ophthalmologist

## 2022-12-07 NOTE — ASSESSMENT & PLAN NOTE
Comment of waking up at night 2-3 times for urination  Probably secondary to enlarged prostate ultrasound October 2022 revealed prostatomegaly    Referred to urologist

## 2022-12-07 NOTE — ASSESSMENT & PLAN NOTE
Slightly elevated ALT with normal AST and other liver enzymes  Patient's  had wine night before blood test   Will observe and follow

## 2022-12-26 PROBLEM — Z00.8 EXAM FOR POPULATION SURVEY: Status: RESOLVED | Noted: 2022-10-27 | Resolved: 2022-12-26

## 2022-12-27 DIAGNOSIS — I10 ESSENTIAL HYPERTENSION: ICD-10-CM

## 2022-12-27 RX ORDER — ATENOLOL 50 MG/1
TABLET ORAL
Qty: 90 TABLET | Refills: 1 | Status: SHIPPED | OUTPATIENT
Start: 2022-12-27

## 2022-12-30 ENCOUNTER — TELEPHONE (OUTPATIENT)
Dept: GASTROENTEROLOGY | Facility: CLINIC | Age: 47
End: 2022-12-30

## 2022-12-30 NOTE — TELEPHONE ENCOUNTER
Recall ltr for colon due to hx of hyperplastic, adenoma polyps and egd due to gerd, Dad and aunts had colon ca with Dr Rodrigez Sep due 1/16/2023

## 2023-01-17 ENCOUNTER — TELEPHONE (OUTPATIENT)
Dept: UROLOGY | Facility: AMBULATORY SURGERY CENTER | Age: 48
End: 2023-01-17

## 2023-01-17 NOTE — TELEPHONE ENCOUNTER
New Patient    What is the reason for the patient’s appointment? Patient being referred for nocturia and enlarged prostate  Also due to US finding of renal lesion  What office location does the patient prefer? [facility]    Imaging/Lab Results: US kidney and bladder    IMPRESSION:     No hydronephrosis or calculi       Slight enlargement of left interpolar cyst with possible thin incomplete septation      5 mm exophytic indeterminant right interpolar lesion  Follow-up ultrasound in 6-9 months recommended to reevaluate the above findings      Prostatomegaly      Suspected fatty liver  Do we accept the patient's insurance or is the patient Self-Pay? Yes, [address] Provider:  Plan Type/Number:  Member ID#: Has the patient had any previous Urologist(s)? No    Have patient records been requested? If not are records showing in Epic:     Has the patient had any outside testing done? n/a    Does the patient have a personal history of cancer?  No    Please advise appropriate time frame for scheduling since there are no upcoming new patient appointments     Patient can be reached at [phone]

## 2023-01-17 NOTE — TELEPHONE ENCOUNTER
Scheduled patient with Dr Jose Manuel Bell 1/24/23 @ [facility] office   Please confirm with patient

## 2023-01-24 ENCOUNTER — OFFICE VISIT (OUTPATIENT)
Dept: UROLOGY | Facility: CLINIC | Age: 48
End: 2023-01-24

## 2023-01-24 VITALS
OXYGEN SATURATION: 97 % | DIASTOLIC BLOOD PRESSURE: 80 MMHG | HEART RATE: 87 BPM | WEIGHT: 231.4 LBS | SYSTOLIC BLOOD PRESSURE: 144 MMHG | HEIGHT: 66 IN | RESPIRATION RATE: 16 BRPM | BODY MASS INDEX: 37.19 KG/M2

## 2023-01-24 DIAGNOSIS — N40.1 BENIGN LOCALIZED PROSTATIC HYPERPLASIA WITH LOWER URINARY TRACT SYMPTOMS (LUTS): ICD-10-CM

## 2023-01-24 DIAGNOSIS — R35.1 NOCTURIA: ICD-10-CM

## 2023-01-24 DIAGNOSIS — N28.9 RENAL LESION: Primary | ICD-10-CM

## 2023-01-24 DIAGNOSIS — N52.9 ERECTILE DYSFUNCTION, UNSPECIFIED ERECTILE DYSFUNCTION TYPE: ICD-10-CM

## 2023-01-24 DIAGNOSIS — N40.0 ENLARGED PROSTATE: ICD-10-CM

## 2023-01-24 RX ORDER — TAMSULOSIN HYDROCHLORIDE 0.4 MG/1
0.4 CAPSULE ORAL
Qty: 90 CAPSULE | Refills: 3 | Status: SHIPPED | OUTPATIENT
Start: 2023-01-24 | End: 2023-04-24

## 2023-01-24 RX ORDER — SILDENAFIL 50 MG/1
50 TABLET, FILM COATED ORAL DAILY PRN
Qty: 10 TABLET | Refills: 5 | Status: SHIPPED | OUTPATIENT
Start: 2023-01-24

## 2023-01-24 NOTE — ASSESSMENT & PLAN NOTE
The patient has a very small renal lesion ultrasound  I personally reviewed the images  Discussed this could represent a small renal tumor  However even if it were to represent kidney cancer at this size we would usually recommend observation until it was larger and more amenable and deserving of intervention  Therefore current plan to follow this up with additional imaging approximately 4 to 6 months from his previous but with axial imaging  Therefore plan for CT scan with contrast in April with BMP prior

## 2023-01-24 NOTE — ASSESSMENT & PLAN NOTE
We discussed how to use PDE5 medication for erectile dysfunction  Risks including lowering the blood pressure as well as headache and flushing were described  Also potential for muscle ache  Recommend the patient avoid using at the same time as flomax

## 2023-01-24 NOTE — PROGRESS NOTES
Assessment/Plan:    Renal lesion  The patient has a very small renal lesion ultrasound  I personally reviewed the images  Discussed this could represent a small renal tumor  However even if it were to represent kidney cancer at this size we would usually recommend observation until it was larger and more amenable and deserving of intervention  Therefore current plan to follow this up with additional imaging approximately 4 to 6 months from his previous but with axial imaging  Therefore plan for CT scan with contrast in April with BMP prior  Erectile dysfunction  We discussed how to use PDE5 medication for erectile dysfunction  Risks including lowering the blood pressure as well as headache and flushing were described  Also potential for muscle ache  Recommend the patient avoid using at the same time as flomax  Enlarged prostate  I described Flomax and the other alpha blockers  I explained the mechanism of action and also the potential side effects including retrograde ejaculation, lower blood pressure (which is usually temporary) and risk for floppy iris syndrome which could be a problem (That can be mitigated) if having cataract surgery  It usually takes 1-2 weeks to see an effect  We using dose once a day and in select patients consider going to b i d  dosing  Subjective:      Patient ID: Eric Knowles is a 46 y o  male  HPI    26-year-old male referred for subcentimeter lesion on renal ultrasound  Renal bladder ultrasound performed in October 2022 for unspecified abdominal pain and left-sided flank pain showed a 5 mm hypoechoic focus in the right kidney  The patient also reports issues with voiding  This includes a weaker stream as well as nocturia  He is not any medications to help him void  He also reports new issues of erectile dysfunction  He has difficulty maintaining an erection  PSA has been ordered but not yet checked      Past Surgical History: Procedure Laterality Date   • COLONOSCOPY  01/12/2018    by Dr Madelin Garcia; needs f/u 1/2023    • COLONOSCOPY     • EGD     • SKIN BIOPSY          Past Medical History:   Diagnosis Date   • Allergic rhinitis    • BMI 34 0-34 9,adult 3/7/2022   • BMI 35 0-35 9,adult 12/3/2021   • BMI 36 0-36 9,adult 08/31/2021   • Colon polyps 12/3/2021   • Constipation    • Elevated ALT measurement 12/6/2022   • Elevated liver enzymes    • Enlarged prostate 12/6/2022   • Erectile dysfunction 12/6/2022   • Essential hypertension 02/27/2021   • Fatigue    • Gastric polyps     history of   • Gastroesophageal reflux disease with esophagitis without hemorrhage 12/3/2021   • GERD (gastroesophageal reflux disease)    • Hyperglycemia 02/27/2021   • Hypertension    • Lupus ([address] )    • Mixed hyperlipidemia 02/27/2021   • Nocturia 12/6/2022   • Obesity    • Renal lesion 12/6/2022   • Rosacea    • Rosacea 3/7/2022   • Seizures ([address] )     6year old sport injury only-no recurrance   • Skin lesion 08/31/2021   • Skin rash 12/7/2022   • SLE (systemic lupus erythematosus related syndrome) ([address] ) 02/27/2021   • Systemic lupus erythematosus ([address] )    • Thrombocytopenia [address] ) 02/27/2021   • Vitamin D deficiency 02/27/2021        AUA SYMPTOM SCORE    Flowsheet Row Most Recent Value   AUA SYMPTOM SCORE    How often have you had a sensation of not emptying your bladder completely after you finished urinating? 2 (P)     How often have you had to urinate again less than two hours after you finished urinating? 4 (P)     How often have you found you stopped and started again several times when you urinate? 2 (P)     How often have you found it difficult to postpone urination? 2 (P)     How often have you had a weak urinary stream? 2 (P)     How often have you had to push or strain to begin urination? 0 (P)     How many times did you most typically get up to urinate from the time you went to bed at night until the time you got up in the morning? 2 (P)     Quality of Life: If you were to spend the rest of your life with your urinary condition just the way it is now, how would you feel about that? 3 (P)     AUA SYMPTOM SCORE 14 (P)            Review of Systems   Constitutional: Negative for chills and fever  HENT: Negative for ear pain and sore throat  Eyes: Negative for pain and visual disturbance  Respiratory: Negative for cough and shortness of breath  Cardiovascular: Negative for chest pain and palpitations  Gastrointestinal: Negative for abdominal pain and vomiting  Genitourinary: Negative for dysuria and hematuria  Musculoskeletal: Negative for arthralgias and back pain  Skin: Negative for color change and rash  Neurological: Negative for seizures and syncope  All other systems reviewed and are negative  Objective:      /80 (BP Location: Left arm, Patient Position: Sitting, Cuff Size: Large)   Pulse 87   Resp 16   Ht 5' 6" (1 676 m)   Wt 105 kg (231 lb 6 4 oz)   SpO2 97%   BMI 37 35 kg/m²     No results found for: PSA         Physical Exam  Vitals reviewed  Constitutional:       Appearance: Normal appearance  He is normal weight  HENT:      Head: Normocephalic and atraumatic  Eyes:      Pupils: Pupils are equal, round, and reactive to light  Abdominal:      General: Abdomen is flat  Neurological:      General: No focal deficit present  Mental Status: He is alert and oriented to person, place, and time  Psychiatric:         Mood and Affect: Mood normal          Thought Content: Thought content normal           I personally viewed the patient's ultrasound showing a small hypoechoic lesion with exophytic component off of the right kidney    Narrative & Impression   RENAL ULTRASOUND WITH PVR     INDICATION:  R10 9: Unspecified abdominal pain    Left flank pain x1 week      COMPARISON: Abdominal ultrasound 11/28/2017     TECHNIQUE:   Ultrasound of the retroperitoneum was performed with a curvilinear transducer utilizing volumetric sweeps and still imaging techniques       FINDINGS:     KIDNEYS:  Symmetric and normal size  Right kidney:  11 6 x 5 1 x 5 2 cm  Volume 161 3 mL  Left kidney:  10 9 x 5 1 x 5 6 cm  Volume 163 5 mL     Right kidney  Normal echogenicity and contour  5 mm hypoechoic focus seen along the outer cortex midpole, indeterminate  This was not clearly evident on the prior study  No internal vascularity seen on color imaging  No hydronephrosis  No shadowing calculi  No perinephric fluid collections      Left kidney  Normal echogenicity and contour  No solid mass is identified  2 6 x 2 9 x 2 2 cm interpolar cyst with possible thin incomplete septation, previously 2 0 x 2 6 x 1 8 cm  No hydronephrosis  No shadowing calculi  No perinephric fluid collections      Increased hepatic echogenicity incidentally noted which may reflect fatty infiltration      URETERS:  Nonvisualized      BLADDER:   Suboptimally distended  No focal thickening or mass lesions  Bilateral ureteral jets detected  Prevoid bladder volume: 90 3 mL  No significant post void volume  Measured post void volume in mL: 16 8     The prostate is enlarged measuring 5 5 x 5 3 x 4 5 cm with hypertrophy of the median lobe invaginating into the base of the bladder         IMPRESSION:     No hydronephrosis or calculi       Slight enlargement of left interpolar cyst with possible thin incomplete septation      5 mm exophytic indeterminant right interpolar lesion  Follow-up ultrasound in 6-9 months recommended to reevaluate the above findings      Prostatomegaly      Suspected fatty liver             Orders  Orders Placed This Encounter   Procedures   • CT abdomen pelvis w wo contrast     Standing Status:   Future     Standing Expiration Date:   1/24/2027     Order Specific Question:   What is the patient's sedation requirement?      Answer:   No Sedation     Order Specific Question:   Contrast information:     Answer:   IV     Order Specific Question:   Did the patient ever have a reaction to x-ray dye? If yes, please verify the type of allergy and order the contrast allergy prep  Answer:   No     Order Specific Question:   Release to patient through [email or web address]     Answer:   Immediate     Order Specific Question:   Reason for Exam (FREE TEXT)     Answer:   reassess smal right renal lesion seen on US   • Basic metabolic panel     This is a patient instruction: Patient fasting for 8 hours or longer recommended       Standing Status:   Future     Standing Expiration Date:   1/24/2024

## 2023-02-09 ENCOUNTER — PREP FOR PROCEDURE (OUTPATIENT)
Dept: GASTROENTEROLOGY | Facility: AMBULARY SURGERY CENTER | Age: 48
End: 2023-02-09

## 2023-02-09 DIAGNOSIS — Z86.010 HISTORY OF COLON POLYPS: Primary | ICD-10-CM

## 2023-02-15 ENCOUNTER — VBI (OUTPATIENT)
Dept: ADMINISTRATIVE | Facility: OTHER | Age: 48
End: 2023-02-15

## 2023-02-20 ENCOUNTER — TELEPHONE (OUTPATIENT)
Dept: GASTROENTEROLOGY | Facility: CLINIC | Age: 48
End: 2023-02-20

## 2023-02-20 DIAGNOSIS — Z12.11 SCREENING FOR COLON CANCER: Primary | ICD-10-CM

## 2023-02-20 NOTE — TELEPHONE ENCOUNTER
Dr Kait Adrian, please send Golytely into pt's pharmacy  Thank you so much!              Scheduled date of EGD/colonoscopy (as of today): 5/16/23  Physician performing EGD/colonoscopy: Dr Kait Adrian   Location of EGD/colonoscopy: [facility]  Desired bowel prep reviewed with patient: [place] my chart   Instructions reviewed with patient by: ls  Clearances:  N/a

## 2023-02-20 NOTE — TELEPHONE ENCOUNTER
[facility] Assessment    Name: Gil Tamayo  YOB: 1975  Last Height: 5' 6" (1 676 m)  Last weight: 105 kg (231 lb 6 4 oz)  BMI: 37 35 kg/m²  Procedure: Colon/egd  Diagnosis: see orders  Date of procedure: 5/16/23  Prep: Golytely my chart  Responsible : yes  Phone#: [phone]  Name completing form: Quiana [place]  Date form completed: 02/20/23      If the patient answers yes to any of these questions, schedule in a hospital  Are you pregnant: No  Do you rely on a wheelchair for mobility: No  Have you been diagnosed with End Stage Renal Disease (ESRD): No  Do you need oxygen during the day: No  Have you had a heart attack or stroke within the past three months: No  Have you had a seizure within the past three months: No  Have you ever been informed by anesthesia that you have a difficult airway: No  Additional Questions  Have you had any cardiac testing or are under the care of a Cardiologist (see cardiac list): No  Cardiac list:   Do you have an implanted cardiac defibrillator: No (Comment:  This patient should be scheduled in the hospital)    Have any bleeding problems, such as anemia or hemophilia (If patient has H&H result below 8, schedule in hospital   H&H must be within 30 days of procedure): No    Had an organ transplant within the past 3 months: No    Do you have any present infections: No  Do you get short of breath when walking a few blocks: No  Have you been diagnosed with diabetes: No  Comments (provide cardiac provider information if applicable):

## 2023-02-24 ENCOUNTER — RA CDI HCC (OUTPATIENT)
Dept: OTHER | Facility: HOSPITAL | Age: 48
End: 2023-02-24

## 2023-02-24 NOTE — PROGRESS NOTES
[facility]  coding opportunities       Chart reviewed, no opportunity found: CHART REVIEWED, NO OPPORTUNITY FOUND        Patients Insurance        Commercial Insurance: [employer]

## 2023-04-01 LAB
BUN SERPL-MCNC: 13 MG/DL (ref 6–24)
BUN/CREAT SERPL: 13 (ref 9–20)
CALCIUM SERPL-MCNC: 9.3 MG/DL (ref 8.7–10.2)
CHLORIDE SERPL-SCNC: 107 MMOL/L (ref 96–106)
CO2 SERPL-SCNC: 23 MMOL/L (ref 20–29)
CREAT SERPL-MCNC: 0.99 MG/DL (ref 0.76–1.27)
EGFR: 93 ML/MIN/1.73
GLUCOSE SERPL-MCNC: 115 MG/DL (ref 70–99)
POTASSIUM SERPL-SCNC: 4.6 MMOL/L (ref 3.5–5.2)
PSA SERPL-MCNC: 4.6 NG/ML (ref 0–4)
SODIUM SERPL-SCNC: 143 MMOL/L (ref 134–144)
TESTOST FREE SERPL-MCNC: 8.5 PG/ML (ref 7.2–24)
TESTOST SERPL-MCNC: 446 NG/DL (ref 264–916)

## 2023-04-03 ENCOUNTER — HOSPITAL ENCOUNTER (OUTPATIENT)
Dept: CT IMAGING | Facility: HOSPITAL | Age: 48
Discharge: HOME | End: 2023-04-03
Attending: UROLOGY

## 2023-04-03 DIAGNOSIS — N28.9 RENAL LESION: ICD-10-CM

## 2023-04-03 RX ADMIN — IOHEXOL 100 ML: 350 INJECTION, SOLUTION INTRAVENOUS at 16:02

## 2023-04-11 PROBLEM — B02.9 HERPES ZOSTER WITHOUT COMPLICATION: Status: ACTIVE | Noted: 2023-04-11

## 2023-04-21 PROBLEM — R97.20 ELEVATED PSA: Status: ACTIVE | Noted: 2023-04-21

## 2023-05-01 ENCOUNTER — TELEPHONE (OUTPATIENT)
Dept: OTHER | Facility: OTHER | Age: 48
End: 2023-05-01

## 2023-05-01 DIAGNOSIS — Z12.11 SCREENING FOR COLON CANCER: Primary | ICD-10-CM

## 2023-05-01 RX ORDER — SODIUM, POTASSIUM,MAG SULFATES 17.5-3.13G
354 SOLUTION, RECONSTITUTED, ORAL ORAL ONCE
Qty: 354 ML | Refills: 0 | Status: SHIPPED | OUTPATIENT
Start: 2023-05-01 | End: 2023-05-16 | Stop reason: ALTCHOICE

## 2023-05-01 NOTE — TELEPHONE ENCOUNTER
Lmom informing pt that Suprep script was sent into his pharmacy and that I would be emailing the instructions  I asked him to please call if does not receive within the next few hours

## 2023-05-10 ENCOUNTER — TELEPHONE (OUTPATIENT)
Dept: GASTROENTEROLOGY | Facility: CLINIC | Age: 48
End: 2023-05-10

## 2023-05-10 NOTE — TELEPHONE ENCOUNTER
lmom confirming pt's colonoscopy/egd scheduled on 5/16/23 at [facility] with Dr Tammy Sams would be calling 1-2 days prior with the arrival time  Informed of clear liquid diet day prior as well as the bowel cleansing preparation  Informed would need a  the day of the procedure due to being under sedation  I asked pt to please call back if has not received instructions or if has any questions

## 2023-05-16 ENCOUNTER — ANESTHESIA (OUTPATIENT)
Dept: GASTROENTEROLOGY | Facility: AMBULATORY SURGERY CENTER | Age: 48
End: 2023-05-16

## 2023-05-16 ENCOUNTER — ANESTHESIA EVENT (OUTPATIENT)
Dept: GASTROENTEROLOGY | Facility: AMBULATORY SURGERY CENTER | Age: 48
End: 2023-05-16

## 2023-05-16 ENCOUNTER — HOSPITAL ENCOUNTER (OUTPATIENT)
Dept: GASTROENTEROLOGY | Facility: AMBULATORY SURGERY CENTER | Age: 48
Discharge: HOME | End: 2023-05-16

## 2023-05-16 VITALS
RESPIRATION RATE: 18 BRPM | BODY MASS INDEX: 36.96 KG/M2 | TEMPERATURE: 97.1 F | DIASTOLIC BLOOD PRESSURE: 70 MMHG | WEIGHT: 230 LBS | HEIGHT: 66 IN | HEART RATE: 105 BPM | OXYGEN SATURATION: 98 % | SYSTOLIC BLOOD PRESSURE: 129 MMHG

## 2023-05-16 DIAGNOSIS — Z86.010 HISTORY OF COLON POLYPS: ICD-10-CM

## 2023-05-16 DIAGNOSIS — K21.00 GASTROESOPHAGEAL REFLUX DISEASE WITH ESOPHAGITIS WITHOUT HEMORRHAGE: Primary | ICD-10-CM

## 2023-05-16 PROBLEM — E66.9 CLASS 2 OBESITY IN ADULT: Status: ACTIVE | Noted: 2023-05-16

## 2023-05-16 PROBLEM — E66.812 CLASS 2 OBESITY IN ADULT: Status: ACTIVE | Noted: 2023-05-16

## 2023-05-16 RX ORDER — SODIUM CHLORIDE, SODIUM LACTATE, POTASSIUM CHLORIDE, CALCIUM CHLORIDE 600; 310; 30; 20 MG/100ML; MG/100ML; MG/100ML; MG/100ML
20 INJECTION, SOLUTION INTRAVENOUS CONTINUOUS
Status: DISCONTINUED | OUTPATIENT
Start: 2023-05-16 | End: 2023-05-20 | Stop reason: HOSPADM

## 2023-05-16 RX ORDER — SODIUM CHLORIDE, SODIUM LACTATE, POTASSIUM CHLORIDE, CALCIUM CHLORIDE 600; 310; 30; 20 MG/100ML; MG/100ML; MG/100ML; MG/100ML
125 INJECTION, SOLUTION INTRAVENOUS CONTINUOUS
Status: DISCONTINUED | OUTPATIENT
Start: 2023-05-16 | End: 2023-05-16

## 2023-05-16 RX ORDER — SODIUM CHLORIDE 9 MG/ML
50 INJECTION, SOLUTION INTRAVENOUS CONTINUOUS
Status: DISCONTINUED | OUTPATIENT
Start: 2023-05-16 | End: 2023-05-20 | Stop reason: HOSPADM

## 2023-05-16 RX ORDER — FAMOTIDINE 20 MG/1
20 TABLET, FILM COATED ORAL 2 TIMES DAILY
Qty: 60 TABLET | Refills: 11 | Status: SHIPPED | OUTPATIENT
Start: 2023-05-16

## 2023-05-16 RX ORDER — PROPOFOL 10 MG/ML
INJECTION, EMULSION INTRAVENOUS AS NEEDED
Status: DISCONTINUED | OUTPATIENT
Start: 2023-05-16 | End: 2023-05-16

## 2023-05-16 RX ORDER — LIDOCAINE HYDROCHLORIDE 20 MG/ML
INJECTION, SOLUTION EPIDURAL; INFILTRATION; INTRACAUDAL; PERINEURAL AS NEEDED
Status: DISCONTINUED | OUTPATIENT
Start: 2023-05-16 | End: 2023-05-16

## 2023-05-16 RX ADMIN — LIDOCAINE HYDROCHLORIDE 100 MG: 20 INJECTION, SOLUTION EPIDURAL; INFILTRATION; INTRACAUDAL; PERINEURAL at 07:32

## 2023-05-16 RX ADMIN — PROPOFOL 150 MG: 10 INJECTION, EMULSION INTRAVENOUS at 07:39

## 2023-05-16 RX ADMIN — PROPOFOL 200 MG: 10 INJECTION, EMULSION INTRAVENOUS at 07:32

## 2023-05-16 RX ADMIN — PROPOFOL 100 MG: 10 INJECTION, EMULSION INTRAVENOUS at 07:55

## 2023-05-16 RX ADMIN — SODIUM CHLORIDE, SODIUM LACTATE, POTASSIUM CHLORIDE, CALCIUM CHLORIDE: 600; 310; 30; 20 INJECTION, SOLUTION INTRAVENOUS at 07:28

## 2023-05-16 RX ADMIN — PROPOFOL 150 MG: 10 INJECTION, EMULSION INTRAVENOUS at 07:45

## 2023-05-16 NOTE — ANESTHESIA POSTPROCEDURE EVALUATION
Post-Op Assessment Note    CV Status:  Stable  Pain Score: 0    Pain management: adequate     Mental Status:  Alert and awake   Hydration Status:  Euvolemic   PONV Controlled:  Controlled   Airway Patency:  Patent      Post Op Vitals Reviewed: Yes      Staff: CRNA         No notable events documented      BP   127/82   Temp  98 0   Pulse  110   Resp   17   SpO2   96

## 2023-05-16 NOTE — H&P
History and Physical - [facility]  Carl Countess Schwab 46 y o  male MRN: [id number]                  HPI: Leonarda Sotomayor is a 46y o  year old male who presents for history of GERD and family history of colon cancer      REVIEW OF SYSTEMS: Per the HPI, and otherwise unremarkable      Historical Information   Past Medical History:   Diagnosis Date   • Allergic rhinitis    • BMI 34 0-34 9,adult 03/07/2022   • BMI 35 0-35 9,adult 12/03/2021   • BMI 36 0-36 9,adult 08/31/2021   • Colon polyps 12/03/2021   • Constipation    • Elevated ALT measurement 12/06/2022   • Elevated liver enzymes    • Enlarged prostate 12/06/2022   • Erectile dysfunction 12/06/2022   • Essential hypertension 02/27/2021   • Fatigue    • Gastric polyps     history of   • Gastroesophageal reflux disease with esophagitis without hemorrhage 12/03/2021   • GERD (gastroesophageal reflux disease)    • Herpes zoster without complication 06/34/2966   • History of shingles    • Hyperglycemia 02/27/2021   • Hypertension    • Lupus ([address] )    • Mixed hyperlipidemia 02/27/2021   • Nocturia 12/06/2022   • Obesity    • Renal lesion 12/06/2022   • Rosacea    • Rosacea 03/07/2022   • Seizures ([address] )     6year old sport injury only-no recurrance   • Skin lesion 08/31/2021   • Skin rash 12/07/2022   • SLE (systemic lupus erythematosus related syndrome) ([address] ) 02/27/2021   • Systemic lupus erythematosus ([facility] )    • Thrombocytopenia ([facility] ) 02/27/2021   • Vitamin D deficiency 02/27/2021     Past Surgical History:   Procedure Laterality Date   • COLONOSCOPY  01/12/2018    by Dr Cotto Age; needs f/u 1/2023    • EGD     • SKIN BIOPSY       Social History   Social History     Substance and Sexual Activity   Alcohol Use Yes    Comment: Occasional - As per [employer]-wine or beer     Social History     Substance and Sexual Activity   Drug Use Never     Social History     Tobacco Use   Smoking Status Former   • Types: Cigars   Smokeless Tobacco Never "    Family History   Problem Relation Age of Onset   • Dementia Mother    • Colon cancer Father    • Cancer Father         colon   • Lung cancer Paternal Grandfather    • Colon cancer Paternal Aunt    • Skin cancer Paternal Uncle    • Colon cancer Maternal Aunt        Meds/Allergies       Current Outpatient Medications:   •  aspirin 81 mg chewable tablet  •  atenolol (TENORMIN) 50 mg tablet  •  famotidine (PEPCID) 20 mg tablet  •  lisinopril (ZESTRIL) 40 mg tablet  •  tamsulosin (FLOMAX) 0 4 mg  •  Vitamin D, Cholecalciferol, 50 MCG (2000 UT) CAPS  •  multivitamin (THERAGRAN) TABS    Current Facility-Administered Medications:   •  lactated ringers infusion, 125 mL/hr, Intravenous, Continuous    Allergies   Allergen Reactions   • Omeprazole GI Intolerance     He developed constipation       Objective     /85   Pulse 94   Temp (!) 97 1 °F (36 2 °C) (Temporal)   Resp 18   Ht 5' 6\" (1 676 m)   Wt 104 kg (230 lb)   SpO2 97%   BMI 37 12 kg/m²       PHYSICAL EXAM    Gen: NAD  Head: NCAT  CV: RRR  CHEST: Clear  ABD: soft, NT/ND  EXT: no edema      ASSESSMENT/PLAN:  This is a 46y o  year old male here for colonoscopy and EGD, and he is stable and optimized for his procedure        "

## 2023-05-16 NOTE — ANESTHESIA PREPROCEDURE EVALUATION
Procedure:  EGD  COLONOSCOPY    Relevant Problems   ANESTHESIA (within normal limits)      CARDIO   (+) Acute midline thoracic back pain   (+) Essential hypertension   (+) Mixed hyperlipidemia      GI/HEPATIC   (+) Gastroesophageal reflux disease with esophagitis without hemorrhage      /RENAL   (+) Renal lesion      HEMATOLOGY   (+) Thrombocytopenia (HCC)      MUSCULOSKELETAL   (+) Acute midline thoracic back pain      PULMONARY   (+) Upper respiratory tract infection      Musculoskeletal and Integument   (+) Rosacea   (+) SLE (systemic lupus erythematosus related syndrome) (HCC)      Other   (+) Class 2 obesity in adult   (+) Enlarged prostate        Physical Exam    Airway    Mallampati score: III  TM Distance: >3 FB  Neck ROM: full     Dental   No notable dental hx     Cardiovascular      Pulmonary      Other Findings        Anesthesia Plan  ASA Score- 2     Anesthesia Type- IV sedation with anesthesia with ASA Monitors  Additional Monitors:   Airway Plan:           Plan Factors-Exercise tolerance (METS): >4 METS  Chart reviewed  Existing labs reviewed  Patient summary reviewed  Patient is not a current smoker  Induction-     Postoperative Plan-     Informed Consent- Anesthetic plan and risks discussed with patient  I personally reviewed this patient with the CRNA  Discussed and agreed on the Anesthesia Plan with the CRNA  Louis Quijano

## 2023-05-23 ENCOUNTER — RA CDI HCC (OUTPATIENT)
Dept: OTHER | Facility: HOSPITAL | Age: 48
End: 2023-05-23

## 2023-05-23 NOTE — PROGRESS NOTES
[facility]  coding opportunities          Chart Reviewed number of suggestions sent to Provider: 1     Patients Insurance        Commercial Insurance: [id number]     [id number]

## 2023-06-24 LAB
CHOLEST SERPL-MCNC: 180 MG/DL (ref 100–199)
HDLC SERPL-MCNC: 32 MG/DL
LDLC SERPL CALC-MCNC: 111 MG/DL (ref 0–99)
PSA SERPL-MCNC: 4.6 NG/ML (ref 0–4)
SL AMB VLDL CHOLESTEROL CALC: 37 MG/DL (ref 5–40)
TRIGL SERPL-MCNC: 208 MG/DL (ref 0–149)

## 2023-06-26 ENCOUNTER — TELEPHONE (OUTPATIENT)
Dept: UROLOGY | Facility: MEDICAL CENTER | Age: 48
End: 2023-06-26

## 2023-06-26 NOTE — TELEPHONE ENCOUNTER
Patient called stating he did his psa on Saturday at [facility] for his appointment on Wednesday

## 2023-06-27 ENCOUNTER — TELEPHONE (OUTPATIENT)
Dept: INTERNAL MEDICINE CLINIC | Facility: CLINIC | Age: 48
End: 2023-06-27

## 2023-06-27 NOTE — TELEPHONE ENCOUNTER
Pt had his labs done and his PSA is elevated and he is concerned - his wife is trying to schedule him an apt but he needs 4 pm or later  I can't give him that time until the end of Aug     Any chance you can do a virtual with him after your 4:30 pt today to go over labs?

## 2023-06-28 ENCOUNTER — OFFICE VISIT (OUTPATIENT)
Dept: UROLOGY | Facility: CLINIC | Age: 48
End: 2023-06-28
Payer: COMMERCIAL

## 2023-06-28 VITALS
HEIGHT: 66 IN | SYSTOLIC BLOOD PRESSURE: 128 MMHG | BODY MASS INDEX: 37.22 KG/M2 | HEART RATE: 87 BPM | OXYGEN SATURATION: 98 % | WEIGHT: 231.6 LBS | DIASTOLIC BLOOD PRESSURE: 84 MMHG

## 2023-06-28 DIAGNOSIS — I10 ESSENTIAL HYPERTENSION: ICD-10-CM

## 2023-06-28 DIAGNOSIS — R97.20 ELEVATED PSA: Primary | ICD-10-CM

## 2023-06-28 PROCEDURE — 99213 OFFICE O/P EST LOW 20 MIN: CPT | Performed by: PHYSICIAN ASSISTANT

## 2023-06-28 RX ORDER — ATENOLOL 50 MG/1
TABLET ORAL
Qty: 90 TABLET | Refills: 1 | Status: SHIPPED | OUTPATIENT
Start: 2023-06-28 | End: 2023-07-03

## 2023-06-28 RX ORDER — ALPRAZOLAM 1 MG/1
TABLET ORAL
Qty: 2 TABLET | Refills: 0 | Status: SHIPPED | OUTPATIENT
Start: 2023-06-28

## 2023-06-28 NOTE — PROGRESS NOTES
UROLOGY PROGRESS NOTE   Patient Identifiers: Romaine Martini (MRN [id number])  Date of Service: 6/28/2023    Subjective:   69-year-old man seen with prostate hypertrophy on Flomax  He has a elevated PSA of 4 6 with nonoperative compare  He is here for follow-up and his repeat PSA is also 4 6  His voiding pattern is stable  He denies any family history of prostate cancer      Reason for visit: Elevated PSA follow-up    Objective:     VITALS:    Vitals:    06/28/23 1440   BP: 128/84   Pulse: 87   SpO2: 98%           LABS:  Lab Results   Component Value Date    HGB 15 6 11/01/2022    HCT 44 9 11/01/2022    WBC 4 5 11/01/2022     (L) 11/01/2022   ]    Lab Results   Component Value Date    K 4 6 03/30/2023     (H) 03/30/2023    CO2 23 03/30/2023    BUN 13 03/30/2023    CREATININE 0 99 03/30/2023   ]        INPATIENT MEDS:    Current Outpatient Medications:   •  ALPRAZolam (XANAX) 1 mg tablet, Take both tablets 1 hour prior to the procedure, Disp: 2 tablet, Rfl: 0  •  aspirin 81 mg chewable tablet, Chew 81 mg daily, Disp: , Rfl:   •  atenolol (TENORMIN) 50 mg tablet, TAKE ONE TABLET BY MOUTH EVERY DAY, Disp: 90 tablet, Rfl: 1  •  famotidine (PEPCID) 20 mg tablet, Take 1 tablet (20 mg total) by mouth 2 (two) times a day, Disp: 60 tablet, Rfl: 3  •  lisinopril (ZESTRIL) 40 mg tablet, Take 1 tablet (40 mg total) by mouth daily, Disp: 90 tablet, Rfl: 2  •  tamsulosin (FLOMAX) 0 4 mg, Take 1 capsule (0 4 mg total) by mouth daily at bedtime, Disp: 90 capsule, Rfl: 3  •  Vitamin D, Cholecalciferol, 50 MCG (2000 UT) CAPS, Take 1 capsule by mouth daily, Disp: , Rfl:   •  famotidine (PEPCID) 20 mg tablet, Take 1 tablet (20 mg total) by mouth 2 (two) times a day (Patient not taking: Reported on 6/28/2023), Disp: 60 tablet, Rfl: 11  •  multivitamin (THERAGRAN) TABS, Take 2 tablets by mouth daily (Patient not taking: Reported on 6/28/2023), Disp: , Rfl:       Physical Exam:   /84 (BP Location: Right arm, "Patient Position: Sitting, Cuff Size: Adult)   Pulse 87   Ht 5' 6\" (1 676 m)   Wt 105 kg (231 lb 9 6 oz)   SpO2 98%   BMI 37 38 kg/m²   GEN: no acute distress    RESP: breathing comfortably with no accessory muscle use    ABD: soft, non-tender, non-distended   INCISION:    EXT: no significant peripheral edema       RADIOLOGY:   None    Assessment:   #1    Elevated PSA    Plan:   -I have ordered a multiparametric MRI and explained the scoring system  -I will not call him for PI-RADS 1 or 2 follow-up in 6 months with a PSA prior to visit  -Anything else I will call him and will arrange for a fusion biopsy  -          "

## 2023-07-01 DIAGNOSIS — I10 ESSENTIAL HYPERTENSION: ICD-10-CM

## 2023-07-03 RX ORDER — ATENOLOL 50 MG/1
TABLET ORAL
Qty: 90 TABLET | Refills: 1 | Status: SHIPPED | OUTPATIENT
Start: 2023-07-03

## 2023-07-22 LAB
BUN SERPL-MCNC: 13 MG/DL (ref 6–24)
BUN/CREAT SERPL: 15 (ref 9–20)
CALCIUM SERPL-MCNC: 10 MG/DL (ref 8.7–10.2)
CHLORIDE SERPL-SCNC: 103 MMOL/L (ref 96–106)
CO2 SERPL-SCNC: 25 MMOL/L (ref 20–29)
CREAT SERPL-MCNC: 0.85 MG/DL (ref 0.76–1.27)
EGFR: 106 ML/MIN/1.73
GLUCOSE SERPL-MCNC: 110 MG/DL (ref 70–99)
POTASSIUM SERPL-SCNC: 4.9 MMOL/L (ref 3.5–5.2)
SODIUM SERPL-SCNC: 142 MMOL/L (ref 134–144)

## 2023-08-03 ENCOUNTER — HOSPITAL ENCOUNTER (OUTPATIENT)
Dept: RADIOLOGY | Age: 48
Discharge: HOME | End: 2023-08-03
Payer: COMMERCIAL

## 2023-08-03 DIAGNOSIS — R97.20 ELEVATED PSA: ICD-10-CM

## 2023-08-03 PROCEDURE — 76377 3D RENDER W/INTRP POSTPROCES: CPT

## 2023-08-03 PROCEDURE — 72197 MRI PELVIS W/O & W/DYE: CPT

## 2023-08-03 PROCEDURE — G1004 CDSM NDSC: HCPCS

## 2023-08-03 PROCEDURE — A9585 GADOBUTROL INJECTION: HCPCS | Performed by: PHYSICIAN ASSISTANT

## 2023-08-03 RX ADMIN — GADOBUTROL 10 ML: 604.72 INJECTION INTRAVENOUS at 14:48

## 2023-08-14 ENCOUNTER — TELEPHONE (OUTPATIENT)
Dept: UROLOGY | Facility: AMBULATORY SURGERY CENTER | Age: 48
End: 2023-08-14

## 2023-08-14 DIAGNOSIS — R97.20 ELEVATED PSA: Primary | ICD-10-CM

## 2023-08-14 NOTE — TELEPHONE ENCOUNTER
Advised patient of results and the plan per Lizandro's last note. Patient in agreement and will keep follow up in December as scheduled. Knows to get PSA prior. Asked for another order since he had to get a BMP prior to MRI and the PSA was accidentally drawn as well. So a new order was placed.  Knows to call office between now and December with any concerns

## 2023-08-14 NOTE — TELEPHONE ENCOUNTER
Patient last seen on 6/28/23 with Sharon Kay in [place] ([place])    Patient requesting to speak to a nurse about his MRI results     Patient requesting call back at [phone].  Patient is available after 3 pm tomorrow

## 2023-08-14 NOTE — TELEPHONE ENCOUNTER
IMPRESSION:     1. PI-RADSv2.1 Category 2 - Low (clinically significant cancer is unlikely to be present).     2. Moderate BPH with calculated prostate volume of  75   cc.       Per last note from [address] [place] [address]:   #1. Elevated PSA     Plan:   -I have ordered a multiparametric MRI and explained the scoring system  -I will not call him for PI-RADS 1 or 2 follow-up in 6 months with a PSA prior to visit  -Anything else I will call him and will arrange for a fusion biopsy      Called and left message with patient to go over results with him.      MRI is ok and came back PIRADs 2 (meaning presence of cancer is unlikely) and per Corlis Bence last note he needs to be scheduled for a follow up in 6 months with a PSA prior

## 2023-08-29 ENCOUNTER — OFFICE VISIT (OUTPATIENT)
Dept: INTERNAL MEDICINE CLINIC | Facility: CLINIC | Age: 48
End: 2023-08-29
Payer: COMMERCIAL

## 2023-08-29 VITALS
HEIGHT: 66 IN | BODY MASS INDEX: 36.64 KG/M2 | OXYGEN SATURATION: 94 % | SYSTOLIC BLOOD PRESSURE: 122 MMHG | RESPIRATION RATE: 18 BRPM | DIASTOLIC BLOOD PRESSURE: 80 MMHG | TEMPERATURE: 98.2 F | HEART RATE: 80 BPM | WEIGHT: 228 LBS

## 2023-08-29 DIAGNOSIS — R73.9 HYPERGLYCEMIA: ICD-10-CM

## 2023-08-29 DIAGNOSIS — I10 ESSENTIAL HYPERTENSION: Primary | ICD-10-CM

## 2023-08-29 DIAGNOSIS — K21.00 GASTROESOPHAGEAL REFLUX DISEASE WITH ESOPHAGITIS WITHOUT HEMORRHAGE: ICD-10-CM

## 2023-08-29 DIAGNOSIS — E55.9 VITAMIN D DEFICIENCY: ICD-10-CM

## 2023-08-29 DIAGNOSIS — L71.9 ROSACEA: ICD-10-CM

## 2023-08-29 DIAGNOSIS — R97.20 ELEVATED PSA: ICD-10-CM

## 2023-08-29 DIAGNOSIS — K63.5 POLYP OF COLON, UNSPECIFIED PART OF COLON, UNSPECIFIED TYPE: ICD-10-CM

## 2023-08-29 DIAGNOSIS — M32.9 SLE (SYSTEMIC LUPUS ERYTHEMATOSUS RELATED SYNDROME) (HCC): ICD-10-CM

## 2023-08-29 DIAGNOSIS — E78.2 MIXED HYPERLIPIDEMIA: ICD-10-CM

## 2023-08-29 PROCEDURE — 99214 OFFICE O/P EST MOD 30 MIN: CPT | Performed by: INTERNAL MEDICINE

## 2023-08-29 NOTE — PROGRESS NOTES
Assessment/Plan:    1. Essential hypertension  -     CBC and differential; Future  -     Comprehensive metabolic panel; Future    2. Gastroesophageal reflux disease with esophagitis without hemorrhage  -     CBC and differential; Future  -     Comprehensive metabolic panel; Future    3. Elevated PSA    4. Vitamin D deficiency  -     Comprehensive metabolic panel; Future  -     Vitamin D 25 hydroxy; Future    5. Mixed hyperlipidemia  -     Lipid panel; Future    6. SLE (systemic lupus erythematosus related syndrome) (HCC)  -     CBC and differential; Future  -     Comprehensive metabolic panel; Future    7. BMI 36.0-36.9,adult    8. Polyp of colon, unspecified part of colon, unspecified type    9. Hyperglycemia  -     Comprehensive metabolic panel; Future  -     Hemoglobin A1C; Future    10. Rosacea        His blood pressure is well controlled. Advised to continue present medications and low-salt diet. GE reflux symptoms are well controlled on famotidine 20 mg twice a day. He had EGD May 2023. Advised to watch diet and reflux precautions. Vitamin D deficiency resolved after being on vitamin D supplement. Advised to continue vitamin D daily. Last cholesterol 180, triglyceride 208, HDL 32, . He has been watching diet for sugar and carbs intake. Will follow lipid panel. His fasting blood sugar was 110 last time advised to watch diet for sugar and carbs intake. We will check blood sugar and hemoglobin A1c. He had a colonoscopy May 2023 was advised follow-up after 5 years. He has a rosacea for which she uses topical cream prescribed by dermatologist.  He was seen by urologist for elevated PSA had MRI of the prostate so far no evidence of carcinoma. For SLE  he does not have any other symptoms of lupus at present. He had seen rheumatology in the past.    Subjective: Patient present for follow-up. Patient ID: Angelia Jimenez is a 46 y.o. male.     HPI  78-year-old white male patient presents follow-up his medical problems. He denies any chest pain, shortness of breath, pain in abdomen. Denies any cough, fever, chills. Denies nausea vomiting diarrhea or constipation. He was seen by urologist for elevated PSA had MRI prostate revealed no evidence of carcinoma. The following portions of the patient's history were reviewed and updated as appropriate:     Past Medical History:  He has a past medical history of Allergic rhinitis, BMI 34.0-34.9,adult (03/07/2022), BMI 35.0-35.9,adult (12/03/2021), BMI 36.0-36.9,adult (08/31/2021), Colon polyps (12/03/2021), Constipation, Elevated ALT measurement (12/06/2022), Elevated liver enzymes, Enlarged prostate (12/06/2022), Erectile dysfunction (12/06/2022), Essential hypertension (02/27/2021), Fatigue, Gastric polyps, Gastroesophageal reflux disease with esophagitis without hemorrhage (12/03/2021), GERD (gastroesophageal reflux disease), Herpes zoster without complication (01/62/8990), History of shingles, Hyperglycemia (02/27/2021), Hypertension, Lupus ([address]), Mixed hyperlipidemia (02/27/2021), Nocturia (12/06/2022), Obesity, Renal lesion (12/06/2022), Rosacea, Rosacea (03/07/2022), Seizures ([address]), Skin lesion (08/31/2021), Skin rash (12/07/2022), SLE (systemic lupus erythematosus related syndrome) ([address]) (02/27/2021), Systemic lupus erythematosus ([address]), Thrombocytopenia ([address]) (02/27/2021), and Vitamin D deficiency (02/27/2021). ,  _______________________________________________________________________  Past Surgical History:   has a past surgical history that includes Colonoscopy (01/12/2018); EGD; and Skin biopsy. ,  _______________________________________________________________________  Family History:  family history includes Cancer in his father; Colon cancer in his father, maternal aunt, and paternal aunt; Dementia in his mother; Lung cancer in his paternal grandfather; Skin cancer in his paternal uncle.,  _______________________________________________________________________  Social History:   reports that he has quit smoking. His smoking use included cigars. He has never used smokeless tobacco. He reports current alcohol use. He reports that he does not use drugs. ,  _______________________________________________________________________  Allergies:  is allergic to omeprazole. .  _______________________________________________________________________  Current Outpatient Medications   Medication Sig Dispense Refill   • aspirin 81 mg chewable tablet Chew 81 mg daily     • atenolol (TENORMIN) 50 mg tablet TAKE ONE TABLET BY MOUTH EVERY DAY 90 tablet 1   • famotidine (PEPCID) 20 mg tablet Take 1 tablet (20 mg total) by mouth 2 (two) times a day 60 tablet 3   • lisinopril (ZESTRIL) 40 mg tablet Take 1 tablet (40 mg total) by mouth daily 90 tablet 2   • multivitamin (THERAGRAN) TABS Take 2 tablets by mouth daily     • tamsulosin (FLOMAX) 0.4 mg Take 1 capsule (0.4 mg total) by mouth daily at bedtime 90 capsule 3   • Vitamin D, Cholecalciferol, 50 MCG (2000 UT) CAPS Take 1 capsule by mouth daily       No current facility-administered medications for this visit.     _______________________________________________________________________  Review of Systems   Constitutional: Negative for chills, fatigue and fever. HENT: Negative for congestion, ear pain, hearing loss, nosebleeds, sinus pain, sore throat and trouble swallowing. Eyes: Negative for discharge, redness and visual disturbance. Respiratory: Negative for cough, chest tightness and shortness of breath. Cardiovascular: Negative for chest pain and palpitations. Gastrointestinal: Negative for abdominal pain, blood in stool, constipation, diarrhea, nausea and vomiting. Genitourinary: Negative for dysuria, flank pain, frequency and hematuria. Musculoskeletal: Negative for arthralgias, myalgias and neck pain.    Skin: Positive for rash ( Has a rash on face from  rosacea. ). Negative for color change. Neurological: Negative for dizziness, speech difficulty, weakness and headaches. Hematological: Does not bruise/bleed easily. Psychiatric/Behavioral: Negative for agitation and behavioral problems. Objective:  Vitals:    08/29/23 1605   BP: 122/80   BP Location: Left arm   Patient Position: Sitting   Cuff Size: Large   Pulse: 80   Resp: 18   Temp: 98.2 °F (36.8 °C)   TempSrc: Temporal   SpO2: 94%   Weight: 103 kg (228 lb)   Height: 5' 6" (1.676 m)     Body mass index is 36.8 kg/m². Physical Exam  Vitals and nursing note reviewed. Constitutional:       General: He is not in acute distress. Appearance: Normal appearance. HENT:      Head: Normocephalic and atraumatic. Right Ear: Ear canal and external ear normal.      Left Ear: Ear canal and external ear normal.      Nose: Nose normal.      Mouth/Throat:      Mouth: Mucous membranes are moist.   Eyes:      General: No scleral icterus. Right eye: No discharge. Left eye: No discharge. Extraocular Movements: Extraocular movements intact. Conjunctiva/sclera: Conjunctivae normal.   Cardiovascular:      Rate and Rhythm: Normal rate and regular rhythm. Pulses: Normal pulses. Heart sounds: No murmur heard. Pulmonary:      Effort: Pulmonary effort is normal. No respiratory distress. Breath sounds: Normal breath sounds. Abdominal:      General: Bowel sounds are normal.      Palpations: Abdomen is soft. Tenderness: There is no abdominal tenderness. Musculoskeletal:         General: Normal range of motion. Cervical back: Normal range of motion and neck supple. No muscular tenderness. Right lower leg: No edema. Left lower leg: No edema. Skin:     General: Skin is warm. Findings: Rash ( Has a slight erythematous rash  on both side of nose without vesicles or any discharge. ) present.    Neurological:      General: No focal deficit present. Mental Status: He is alert and oriented to person, place, and time. Motor: No weakness. Coordination: Coordination normal.   Psychiatric:         Mood and Affect: Mood normal.         Behavior: Behavior normal.          I spent 30 minutes with the patient today.   More than 50% time spent for reviewing of external notes, reviewing of the results of diagnostics test, management of care, patient education and ordering of test.

## 2023-08-29 NOTE — PATIENT INSTRUCTIONS
Patient was advised to continue present medications. discussed with the patient medications and laboratory data in detail. Follow-up with me in 3 months or as advised. If any blood test was ordered please do 1 week prior to next appointment unless advise to get earlier.   If you have any questions please call the office [phone]

## 2023-09-06 ENCOUNTER — TELEPHONE (OUTPATIENT)
Dept: INTERNAL MEDICINE CLINIC | Facility: CLINIC | Age: 48
End: 2023-09-06

## 2023-09-06 DIAGNOSIS — I10 ESSENTIAL HYPERTENSION: ICD-10-CM

## 2023-09-06 RX ORDER — LISINOPRIL 40 MG/1
40 TABLET ORAL DAILY
Qty: 90 TABLET | Refills: 2 | Status: SHIPPED | OUTPATIENT
Start: 2023-09-06

## 2023-09-06 NOTE — TELEPHONE ENCOUNTER
Fax received from [facility] requesting refill of Lisinopril 40mg. Refill sent at this time.

## 2023-10-23 ENCOUNTER — HOSPITAL ENCOUNTER (EMERGENCY)
Facility: HOSPITAL | Age: 48
Discharge: HOME | End: 2023-10-23
Attending: EMERGENCY MEDICINE
Payer: COMMERCIAL

## 2023-10-23 VITALS
WEIGHT: 229 LBS | SYSTOLIC BLOOD PRESSURE: 131 MMHG | TEMPERATURE: 98.4 F | RESPIRATION RATE: 16 BRPM | OXYGEN SATURATION: 97 % | BODY MASS INDEX: 36.96 KG/M2 | DIASTOLIC BLOOD PRESSURE: 82 MMHG | HEART RATE: 82 BPM

## 2023-10-23 DIAGNOSIS — M54.31 SCIATIC PAIN, RIGHT: Primary | ICD-10-CM

## 2023-10-23 LAB
HOLD SPECIMEN: NORMAL
HOLD SPECIMEN: NORMAL

## 2023-10-23 PROCEDURE — 99283 EMERGENCY DEPT VISIT LOW MDM: CPT

## 2023-10-23 PROCEDURE — 36415 COLL VENOUS BLD VENIPUNCTURE: CPT

## 2023-10-23 PROCEDURE — 99284 EMERGENCY DEPT VISIT MOD MDM: CPT | Performed by: EMERGENCY MEDICINE

## 2023-10-23 RX ORDER — LIDOCAINE 50 MG/G
1 PATCH TOPICAL ONCE
Status: DISCONTINUED | OUTPATIENT
Start: 2023-10-23 | End: 2023-10-23 | Stop reason: HOSPADM

## 2023-10-23 RX ADMIN — LIDOCAINE 1 PATCH: 700 PATCH TOPICAL at 14:10

## 2023-10-23 NOTE — ED ATTENDING ATTESTATION
10/23/2023  IMaru DO, saw and evaluated the patient. I have discussed the patient with the resident/non-physician practitioner and agree with the resident's/non-physician practitioner's findings, Plan of Care, and MDM as documented in the resident's/non-physician practitioner's note, except where noted. All available labs and Radiology studies were reviewed. I was present for key portions of any procedure(s) performed by the resident/non-physician practitioner and I was immediately available to provide assistance. At this point I agree with the current assessment done in the Emergency Department. I have conducted an independent evaluation of this patient a history and physical is as follows:    Patient is a 39-year-old male is a history of hypertension who presents with back pain. Patient states that his pain started on Saturday, 10/21/2023. He states that he was on his feet all day at work on Friday but had no specific injuries. He describes pain in his right buttock which radiates down his posterior thigh and around to his medial thigh. Pain improves when he is resting and worsens when he is ambulating or standing on his feet. He has had similar pain previously, which was diagnosed as bursitis. However he states that this episode is more constant and severe than previous. He denies any numbness, tingling, weakness, bowel or bladder dysfunction. On exam, patient is in no acute distress. Heart is regular rate and rhythm. Breath sounds normal.  Abdomen is soft, nontender, nondistended. No rebound or guarding. No midline tenderness in the lumbar spine. No tenderness in the SI region or paraspinal musculature. Negative straight leg raise. Motor, sensation normal.  Reflexes normal.    Given history and physical, suspect musculoskeletal etiology. Neurovascularly intact. Do not suspect AAA, aortic dissection, renal pathology, perforated viscus.  Low suspicion for cauda equina or epidural compression syndrome as there is no bowel or bladder dysfunction, bilateral symptoms or saddle paresthesias. No red flag features such as trauma, fever, immunocompromised state, history of malignancy. Do not feel that emergent imaging indicated at this time. Patient advised to follow up with PCP and return to ED if symptoms progress. Portions of the above record have been created with voice recognition software. Occasional wrong word or "sound alike" substitutions may have occurred due to the inherent limitations of voice recognition software. Read the chart carefully and recognize, using context, where substitutions may have occurred.       ED Course         Critical Care Time  Procedures

## 2023-10-23 NOTE — Clinical Note
Patti Chan was seen and treated in our emergency department on 10/23/2023. Diagnosis:     Valeriy Mo  may return to work on return date, is off the rest of the shift today. He may return on this date: 10/26/2023         If you have any questions or concerns, please don't hesitate to call.       Lazarus Wong MD    ______________________________           _______________          _______________  [facility] Representative                              Date                                Time

## 2023-10-23 NOTE — ED PROVIDER NOTES
History  Chief Complaint   Patient presents with    Leg Pain     Pt presents with right leg/hip pain states it gets warm when he is on it all day at work. States he had something similar before and it was related to a bursa sac. Ryan Reese is a 46 y.o. male presenting with right leg/hip pain since Friday. PMHx pertinent for SLE, history of shingles, thrombocytopenia. Onset of symptoms was Friday, 3 days ago. Describes symptoms as sharp severe lancing pain originating in his buttock region and radiating down over the anterior aspect of his thigh into the inner thigh when he stands in a particular position. No weakness or numbness distally with the pain. Pain is only associated with standing, does not have pain when sitting. There is not specific time of onset. Patient denies associated symptoms including fevers, chills, rash, urinary or bowel incontinence, urinary frequency, dysuria, abdominal pain, or new numbness, tingling or weakness in his extremities. Patient has been trying Tylenol for pain, without significant improvement, most improvement is through rest and avoiding the positions he knows will cause the pain. Reports normal sleep and normal appetite. Ambulating at his baseline, though hesitant to  certain positions in anticipation of pain. The following portions of the patient's history were reviewed and updated as appropriate: allergies, current medications, past family history, past medical history, past social history, past surgical history, and problem list.    History collected from patient, translation services not necessary. Per patient interview and chart review:   Allergies include:   -- Omeprazole -- GI Intolerance    --  He developed constipation    Immunizations:    Immunization History  Administered            Date(s) Administered    COVID-19 PFIZER VACCINE 0.3 ML IM                          04/15/2021  05/07/2021  12/18/2021      INFLUENZA             11/01/2022 Immunizations Reviewed. Prior to Admission Medications   Prescriptions Last Dose Informant Patient Reported? Taking?    Vitamin D, Cholecalciferol, 50 MCG (2000 UT) CAPS  Self Yes No   Sig: Take 1 capsule by mouth daily   aspirin 81 mg chewable tablet  Self Yes No   Sig: Chew 81 mg daily   atenolol (TENORMIN) 50 mg tablet   No No   Sig: TAKE ONE TABLET BY MOUTH EVERY DAY   famotidine (PEPCID) 20 mg tablet  Self No No   Sig: Take 1 tablet (20 mg total) by mouth 2 (two) times a day   lisinopril (ZESTRIL) 40 mg tablet   No No   Sig: Take 1 tablet (40 mg total) by mouth daily   multivitamin (THERAGRAN) TABS  Self Yes No   Sig: Take 2 tablets by mouth daily   tamsulosin (FLOMAX) 0.4 mg  Self No No   Sig: Take 1 capsule (0.4 mg total) by mouth daily at bedtime      Facility-Administered Medications: None       Past Medical History:   Diagnosis Date    Allergic rhinitis     BMI 34.0-34.9,adult 03/07/2022    BMI 35.0-35.9,adult 12/03/2021    BMI 36.0-36.9,adult 08/31/2021    Colon polyps 12/03/2021    Constipation     Elevated ALT measurement 12/06/2022    Elevated liver enzymes     Enlarged prostate 12/06/2022    Erectile dysfunction 12/06/2022    Essential hypertension 02/27/2021    Fatigue     Gastric polyps     history of    Gastroesophageal reflux disease with esophagitis without hemorrhage 12/03/2021    GERD (gastroesophageal reflux disease)     Herpes zoster without complication 42/90/8314    History of shingles     Hyperglycemia 02/27/2021    Hypertension     Lupus ([address])     Mixed hyperlipidemia 02/27/2021    Nocturia 12/06/2022    Obesity     Renal lesion 12/06/2022    Rosacea     Rosacea 03/07/2022    Seizures ([address])     6year old sport injury only-no recurrance    Skin lesion 08/31/2021    Skin rash 12/07/2022    SLE (systemic lupus erythematosus related syndrome) ([address]) 02/27/2021    Systemic lupus erythematosus (HCC)     Thrombocytopenia ([address]) 02/27/2021    Vitamin D deficiency 02/27/2021       Past Surgical History:   Procedure Laterality Date    COLONOSCOPY  01/12/2018    by Dr. Brower Members; needs f/u 1/2023     EGD      SKIN BIOPSY         Family History   Problem Relation Age of Onset    Dementia Mother     Colon cancer Father     Cancer Father         colon    Lung cancer Paternal Grandfather     Colon cancer Paternal Aunt     Skin cancer Paternal Uncle     Colon cancer Maternal Aunt      I have reviewed and agree with the history as documented. E-Cigarette/Vaping    E-Cigarette Use Never User      E-Cigarette/Vaping Substances    Nicotine No     THC No     CBD No     Flavoring No     Other No     Unknown No      Social History     Tobacco Use    Smoking status: Former     Types: Cigars    Smokeless tobacco: Never   Vaping Use    Vaping Use: Never used   Substance Use Topics    Alcohol use: Yes     Comment: Occasional - As per [employer]-wine or beer    Drug use: Never        Review of Systems   Constitutional:  Negative for chills and fever. HENT:  Negative for congestion, rhinorrhea and sore throat. Eyes:  Negative for pain and visual disturbance. Respiratory:  Negative for cough, chest tightness, shortness of breath, wheezing and stridor. Cardiovascular:  Negative for chest pain, palpitations and leg swelling. Gastrointestinal:  Negative for abdominal pain, constipation, diarrhea, nausea and vomiting. Genitourinary:  Negative for dysuria and hematuria. Musculoskeletal:  Positive for myalgias (Sharp pain starting in hip and going anteriorly over thigh to medial thigh). Negative for arthralgias and back pain. Skin:  Negative for color change, pallor and rash (Denies rash). Neurological:  Negative for dizziness, syncope, light-headedness, numbness and headaches. Psychiatric/Behavioral:  Negative for behavioral problems. All other systems reviewed and are negative.       Physical Exam  ED Triage Vitals   Temperature Pulse Respirations Blood Pressure SpO2   10/23/23 1126 10/23/23 1126 10/23/23 1126 10/23/23 1126 10/23/23 1126   98.4 °F (36.9 °C) 71 16 129/78 96 %      Temp Source Heart Rate Source Patient Position - Orthostatic VS BP Location FiO2 (%)   10/23/23 1126 10/23/23 1126 10/23/23 1230 10/23/23 1126 --   Oral Monitor Sitting Right arm       Pain Score       --                    Orthostatic Vital Signs  Vitals:    10/23/23 1126 10/23/23 1230   BP: 129/78 131/82   Pulse: 71 82   Patient Position - Orthostatic VS:  Sitting       Physical Exam  Vitals and nursing note reviewed. Constitutional:       Appearance: Normal appearance. He is well-developed. He is not ill-appearing or toxic-appearing. HENT:      Head: Normocephalic and atraumatic. Nose: No rhinorrhea. Mouth/Throat:      Mouth: Mucous membranes are moist.      Pharynx: Oropharynx is clear. Eyes:      Extraocular Movements: Extraocular movements intact. Conjunctiva/sclera: Conjunctivae normal.   Cardiovascular:      Rate and Rhythm: Normal rate and regular rhythm. Pulses: Normal pulses. Heart sounds: Normal heart sounds. No murmur heard. Pulmonary:      Effort: Pulmonary effort is normal. No respiratory distress. Breath sounds: Normal breath sounds. Abdominal:      General: Abdomen is flat. Bowel sounds are normal.      Palpations: Abdomen is soft. Tenderness: There is no abdominal tenderness. Musculoskeletal:      Cervical back: Neck supple. Right hip: Normal. No tenderness or bony tenderness. Normal range of motion. Normal strength. Left hip: Normal. No tenderness or bony tenderness. Normal range of motion. Normal strength. Right upper leg: Normal. No tenderness or bony tenderness. Left upper leg: Normal. No tenderness or bony tenderness. Right knee: Normal. No erythema or bony tenderness. Normal range of motion. No tenderness. Left knee: Normal. No erythema or bony tenderness. Normal range of motion. No tenderness. Right lower leg: No edema. Left lower leg: No edema. Skin:     General: Skin is warm and dry. Capillary Refill: Capillary refill takes less than 2 seconds. Neurological:      General: No focal deficit present. Mental Status: He is alert and oriented to person, place, and time. Sensory: Sensation is intact. No sensory deficit. Motor: Motor function is intact. No weakness or atrophy. Gait: Gait normal.   Psychiatric:         Mood and Affect: Mood normal.         Behavior: Behavior normal.         ED Medications  Medications - No data to display    Diagnostic Studies  Results Reviewed       Procedure Component Value Units Date/Time    [place] draw [[id number]] Collected: 10/23/23 1130    Lab Status: Final result Specimen: Blood from Arm, Left Updated: 10/23/23 1301    Narrative: The following orders were created for panel order [place] draw. Procedure                               Abnormality         Status                     ---------                               -----------         ------                     Evelena Matthew Top on UKZK[[id number]]                           Final result               Green / Yellow tube on GBBG[[id number]]                      Final result               Lavender Top 3 ml on LPWL[[id number]]                        Final result                 Please view results for these tests on the individual orders. No orders to display         Procedures  Procedures      ED Course  ED Course as of 10/23/23 2057   Mon Oct 23, 2023   1304 Blood Pressure: 129/78  129/78, HR 71, RR 16, 98.4 F, 96% RA on presentation. 1305 Blood Pressure: 131/82  /82, HR 82, RR 16, 97% RA at 1230   1336 Patient seen at bedside. Heart regular rate and rhythm. Lungs clear bilaterally  Abdomen soft, non-tender, non-distended, no rebound, no bruit, normal bowel sounds. Pulses, sensation and strength intact in all four extremities. No outward signs of trauma, no rashes.   Straight leg raise negative bilaterally. Right leg and hip non-tender to palpation. Strength equal to left leg on flexion, extension of hip, knee and ankle. No peripheral edema. Will give lidocaine patch. 1454 Re-evaluated at bedside. Discussed findings of physical exam and symptoms. Discussed importance of following up with PCP in the next few days. Return precautions given. All questions answered. Medical Decision Making  Initial ED assessment:  Patient presents with 3 days of sharp severe lancing pain starting in hip and radiating over the anterior aspect of his thigh into the inner thigh when he stands in a particular position. No fevers, chills, rash, urinary or bowel incontinence, urinary frequency, dysuria, abdominal pain, or new numbness, tingling or weakness in his extremities. Afebrile, vital signs within normal limits. Physical exam showed heart regular rate and rhythm. Lungs clear bilaterally  Abdomen soft, non-tender, non-distended, no rebound, no bruit, normal bowel sounds. Pulses, sensation and strength intact in all four extremities. No outward signs of trauma, no rashes. Straight leg raise negative bilaterally. Right leg and hip non-tender to palpation. Strength equal to left leg on flexion, extension of hip, knee and ankle. No peripheral edema. Initial DDx includes but is not limited to: Most likely sciatica or similar nerve pain given symptoms and exam. Also considered, herniated disc, arthritis, spinal stenosis, strain, sprain. Low suspicion for fracture, cauda equina syndrome, epidural abscess, transverse myelitis, AAA. Will give lidocaine patch for pain. Do not think imaging or labwork is warranted at this time. Will have him follow-up with PCP out patient with strict return precautions. Updates:    See ED course for further updates.     Final ED summary/disposition: After evaluation and workup in the emergency department patient appears well, is nontoxic appearing, patient expresses understanding and agrees with plan of care at this time. In light of this patient would benefit from outpatient management. Discussed importance of follow-up in the next few days, as well as strict return precautions especially related to back and hip pain. Patient expressed understanding, all questions answered. Risk  Prescription drug management. Disposition  Final diagnoses:   Sciatic pain, right     Time reflects when diagnosis was documented in both MDM as applicable and the Disposition within this note       Time User Action Codes Description Comment    10/23/2023  2:49 PM Kevin Dinh Destiney [M54.31] Sciatic pain, right           ED Disposition       ED Disposition   Discharge    Condition   Stable    Date/Time   Mon Oct 23, 2023  1:55 PM    Comment   Sera Jason Schwab discharge to home/self care.                    Follow-up Information       Follow up With Specialties Details Why Contact Info    Dayna Thrasher MD Internal Medicine Schedule an appointment as soon as possible for a visit in 2 days Re-evaluation of symptoms [address] [facility] [address] [facility]  [phone]              Discharge Medication List as of 10/23/2023  2:51 PM        CONTINUE these medications which have NOT CHANGED    Details   aspirin 81 mg chewable tablet Chew 81 mg daily, Historical Med      atenolol (TENORMIN) 50 mg tablet TAKE ONE TABLET BY MOUTH EVERY DAY, Normal      famotidine (PEPCID) 20 mg tablet Take 1 tablet (20 mg total) by mouth 2 (two) times a day, Starting Mon 4/17/2023, Normal      lisinopril (ZESTRIL) 40 mg tablet Take 1 tablet (40 mg total) by mouth daily, Starting Wed 9/6/2023, Normal      multivitamin (THERAGRAN) TABS Take 2 tablets by mouth daily, Historical Med      tamsulosin (FLOMAX) 0.4 mg Take 1 capsule (0.4 mg total) by mouth daily at bedtime, Starting Tue 1/24/2023, Until Tue 8/29/2023, Normal      Vitamin D, Cholecalciferol, 50 MCG (2000 UT) CAPS Take 1 capsule by mouth daily, Historical Med           No discharge procedures on file. PDMP Review       None             ED Provider  Attending physically available and evaluated Marley Gan. I managed the patient along with the ED Attending.     Electronically Signed by           Edilia Favre, MD  10/23/23 6470

## 2023-10-24 NOTE — DISCHARGE INSTRUCTIONS
You were evaluated in the emergency department for: right leg pain. You can access your current and pending results through [facility]. A radiologist will take a second look at your X-Rays, if you had any, and you will be contacted with any new findings. You should follow-up with your primary care provider, as soon as possible, for re-evaluation. If you do not have a primary care provider, I have referred you to [facility]. Please, return to the emergency department if you experience new or worsening symptoms, fever, chest pain, shortness of breath, difficulty breathing, dizziness, abdominal pain, persistent nausea/vomiting, syncope or passing out, blood in your urine or stool, coughing up blood, leg swelling/pain, urinary retention, bowel or bladder incontinence, numbness between your legs. 24-Oct-2023 16:53

## 2023-11-20 ENCOUNTER — RA CDI HCC (OUTPATIENT)
Dept: OTHER | Facility: HOSPITAL | Age: 48
End: 2023-11-20

## 2023-11-20 NOTE — PROGRESS NOTES
[address] [facility] coding opportunities       Chart reviewed, no opportunity found: CHART REVIEWED, NO OPPORTUNITY FOUND        Patients Insurance        Commercial Insurance: [address]

## 2023-11-29 ENCOUNTER — OFFICE VISIT (OUTPATIENT)
Dept: INTERNAL MEDICINE CLINIC | Facility: CLINIC | Age: 48
End: 2023-11-29
Payer: COMMERCIAL

## 2023-11-29 VITALS
TEMPERATURE: 98.7 F | RESPIRATION RATE: 18 BRPM | BODY MASS INDEX: 36.96 KG/M2 | HEART RATE: 90 BPM | SYSTOLIC BLOOD PRESSURE: 124 MMHG | WEIGHT: 230 LBS | HEIGHT: 66 IN | OXYGEN SATURATION: 96 % | DIASTOLIC BLOOD PRESSURE: 80 MMHG

## 2023-11-29 DIAGNOSIS — L71.9 ROSACEA: ICD-10-CM

## 2023-11-29 DIAGNOSIS — R97.20 ELEVATED PSA: ICD-10-CM

## 2023-11-29 DIAGNOSIS — I10 ESSENTIAL HYPERTENSION: Primary | ICD-10-CM

## 2023-11-29 DIAGNOSIS — E55.9 VITAMIN D DEFICIENCY: ICD-10-CM

## 2023-11-29 DIAGNOSIS — D69.6 THROMBOCYTOPENIA (HCC): ICD-10-CM

## 2023-11-29 DIAGNOSIS — R73.9 HYPERGLYCEMIA: ICD-10-CM

## 2023-11-29 DIAGNOSIS — K21.00 GASTROESOPHAGEAL REFLUX DISEASE WITH ESOPHAGITIS WITHOUT HEMORRHAGE: ICD-10-CM

## 2023-11-29 DIAGNOSIS — M32.9 SLE (SYSTEMIC LUPUS ERYTHEMATOSUS RELATED SYNDROME) (HCC): ICD-10-CM

## 2023-11-29 DIAGNOSIS — E78.2 MIXED HYPERLIPIDEMIA: ICD-10-CM

## 2023-11-29 PROCEDURE — 99213 OFFICE O/P EST LOW 20 MIN: CPT | Performed by: INTERNAL MEDICINE

## 2023-11-29 RX ORDER — ATENOLOL 50 MG/1
50 TABLET ORAL DAILY
Qty: 90 TABLET | Refills: 1 | Status: SHIPPED | OUTPATIENT
Start: 2023-11-29

## 2023-11-29 RX ORDER — LISINOPRIL 40 MG/1
40 TABLET ORAL DAILY
Qty: 90 TABLET | Refills: 1 | Status: SHIPPED | OUTPATIENT
Start: 2023-11-29

## 2023-11-29 NOTE — PATIENT INSTRUCTIONS
Patient was advised to continue present medications. discussed with the patient medications and laboratory data in detail. Follow-up with me in 3 months or as advised. If any blood test was ordered please do 1 week prior to next appointment unless advise to get earlier.   If you have any questions please call the office [phone]

## 2023-11-29 NOTE — PROGRESS NOTES
Assessment/Plan:    1. Essential hypertension  -     CBC and differential; Future  -     Comprehensive metabolic panel; Future  -     UA (URINE) with reflex to Scope  -     atenolol (TENORMIN) 50 mg tablet; Take 1 tablet (50 mg total) by mouth daily  -     lisinopril (ZESTRIL) 40 mg tablet; Take 1 tablet (40 mg total) by mouth daily    2. Gastroesophageal reflux disease with esophagitis without hemorrhage    3. SLE (systemic lupus erythematosus related syndrome) (HCC)  -     Comprehensive metabolic panel; Future  -     UA (URINE) with reflex to Scope    4. Thrombocytopenia (HCC)  -     CBC and differential; Future    5. Elevated PSA    6. Mixed hyperlipidemia  -     Lipid panel; Future    7. Hyperglycemia  -     Comprehensive metabolic panel; Future  -     Hemoglobin A1C; Future    8. Vitamin D deficiency  -     Comprehensive metabolic panel; Future  -     Vitamin D 25 hydroxy; Future    9. BMI 37.0-37.9, adult    10. Rosacea      BMI Counseling: Body mass index is 37.12 kg/m². The BMI is above normal. Nutrition recommendations include decreasing portion sizes, decreasing fast food intake, consuming healthier snacks, limiting drinks that contain sugar, moderation in carbohydrate intake, reducing intake of saturated and trans fat and reducing intake of cholesterol. Exercise recommendations include exercising 3-5 times per week. No pharmacotherapy was ordered. Rationale for BMI follow-up plan is due to patient being overweight or obese. Depression Screening and Follow-up Plan: Patient was screened for depression during today's encounter. They screened negative with a PHQ-2 score of 0. Blood pressure well-controlled. Advised to continue present medications and low-salt diet. Stop taking famotidine as GE reflux is all better since he has been watching diet. For SLE I advised patient to follow-up with her rheumatologist as she gets joints pain. He has a thrombocytopenia will follow CBC.   Has had normal WBC and hemoglobin. Could be from lupus. CT scan April 2023 revealed normal liver and spleen. He has been seen and followed by urologist for elevated PSA. Last cholesterol 180, triglycerides up to elevated to 208 HDL 32 . He tried physical unable to tolerate. Advised for low-cholesterol diet. Follow lipid panel. Last positive blood sugar 110. Advised to watch diet for sugar and carbs intake will follow blood sugar and check hemoglobin A1c. Vitamin D deficiency resolved on vitamin D supplement. Will follow vitamin D level. He takes vitamin D 2000 IU daily. He has rosacea on the face uses MetroGel as needed. Subjective: Patient presents for follow-up. Patient ID: Melony Nava is a 46 y.o. male. HPI  22-year-old white male patient presents for follow-up of his medical problems. He denies any chest pain, shortness of breath, pain in abdomen. Denies any cough, fever, chills. Denies any nausea vomiting diarrhea.     The following portions of the patient's history were reviewed and updated as appropriate:     Past Medical History:  He has a past medical history of Allergic rhinitis, BMI 34.0-34.9,adult (03/07/2022), BMI 35.0-35.9,adult (12/03/2021), BMI 36.0-36.9,adult (08/31/2021), BMI 37.0-37.9, adult (11/29/2023), Colon polyps (12/03/2021), Constipation, Elevated ALT measurement (12/06/2022), Elevated liver enzymes, Enlarged prostate (12/06/2022), Erectile dysfunction (12/06/2022), Essential hypertension (02/27/2021), Fatigue, Gastric polyps, Gastroesophageal reflux disease with esophagitis without hemorrhage (12/03/2021), GERD (gastroesophageal reflux disease), Herpes zoster without complication (81/36/3988), History of shingles, Hyperglycemia (02/27/2021), Hypertension, Lupus ([address]), Mixed hyperlipidemia (02/27/2021), Nocturia (12/06/2022), Obesity, Renal lesion (12/06/2022), Rosacea, Rosacea (03/07/2022), Seizures ([address]), Skin lesion (08/31/2021), Skin rash (12/07/2022), SLE (systemic lupus erythematosus related syndrome) ([address]) (02/27/2021), Systemic lupus erythematosus ([address]), Thrombocytopenia ([address]) (02/27/2021), and Vitamin D deficiency (02/27/2021). ,  _______________________________________________________________________  Past Surgical History:   has a past surgical history that includes Colonoscopy (01/12/2018); EGD; and Skin biopsy. ,  _______________________________________________________________________  Family History:  family history includes Cancer in his father; Colon cancer in his father, maternal aunt, and paternal aunt; Dementia in his mother; Lung cancer in his paternal grandfather; Skin cancer in his paternal uncle.,  _______________________________________________________________________  Social History:   reports that he has quit smoking. His smoking use included cigars. He has never used smokeless tobacco. He reports current alcohol use. He reports that he does not use drugs. ,  _______________________________________________________________________  Allergies:  is allergic to omeprazole. .  _______________________________________________________________________  Current Outpatient Medications   Medication Sig Dispense Refill    aspirin 81 mg chewable tablet Chew 81 mg daily      atenolol (TENORMIN) 50 mg tablet Take 1 tablet (50 mg total) by mouth daily 90 tablet 1    lisinopril (ZESTRIL) 40 mg tablet Take 1 tablet (40 mg total) by mouth daily 90 tablet 1    multivitamin (THERAGRAN) TABS Take 2 tablets by mouth daily      tamsulosin (FLOMAX) 0.4 mg Take 1 capsule (0.4 mg total) by mouth daily at bedtime 90 capsule 3    Vitamin D, Cholecalciferol, 50 MCG (2000 UT) CAPS Take 1 capsule by mouth daily       No current facility-administered medications for this visit.     _______________________________________________________________________  Review of Systems   Constitutional:  Negative for chills and fever.    HENT:  Negative for congestion, ear pain, hearing loss, nosebleeds, sinus pain, sore throat and trouble swallowing. Eyes:  Negative for discharge, redness and visual disturbance. Respiratory:  Negative for cough, chest tightness and shortness of breath. Cardiovascular:  Negative for chest pain and palpitations. Gastrointestinal:  Negative for abdominal pain, blood in stool, constipation, diarrhea, nausea and vomiting. Genitourinary:  Negative for dysuria, flank pain, frequency and hematuria. Musculoskeletal:  Positive for arthralgias. Negative for myalgias and neck pain. Skin:  Negative for color change and rash. Neurological:  Negative for dizziness, speech difficulty, weakness and headaches. Hematological:  Does not bruise/bleed easily. Psychiatric/Behavioral:  Negative for agitation and behavioral problems. Objective:  Vitals:    11/29/23 0853   BP: 124/80   BP Location: Left arm   Patient Position: Sitting   Cuff Size: Large   Pulse: 90   Resp: 18   Temp: 98.7 °F (37.1 °C)   TempSrc: Temporal   SpO2: 96%   Weight: 104 kg (230 lb)   Height: 5' 6" (1.676 m)     Body mass index is 37.12 kg/m². Physical Exam  Vitals and nursing note reviewed. Constitutional:       General: He is not in acute distress. Appearance: He is obese. HENT:      Head: Normocephalic and atraumatic. Right Ear: Ear canal and external ear normal.      Left Ear: Ear canal and external ear normal.      Nose: Nose normal.      Mouth/Throat:      Mouth: Mucous membranes are moist.   Eyes:      General: No scleral icterus. Right eye: No discharge. Left eye: No discharge. Extraocular Movements: Extraocular movements intact. Conjunctiva/sclera: Conjunctivae normal.   Cardiovascular:      Rate and Rhythm: Normal rate and regular rhythm. Pulses: Normal pulses. Heart sounds: Normal heart sounds. No murmur heard. Pulmonary:      Effort: Pulmonary effort is normal. No respiratory distress. Breath sounds: Normal breath sounds.  No wheezing, rhonchi or rales. Abdominal:      General: Bowel sounds are normal.      Palpations: Abdomen is soft. Tenderness: There is no abdominal tenderness. Musculoskeletal:         General: Normal range of motion. Cervical back: Normal range of motion and neck supple. No muscular tenderness. Right lower leg: No edema. Left lower leg: No edema. Skin:     General: Skin is warm. Findings: Rash (He has a erythematous macular rash on nose as well as both side of the nose.) present. Neurological:      General: No focal deficit present. Mental Status: He is alert and oriented to person, place, and time. Motor: No weakness.       Coordination: Coordination normal.   Psychiatric:         Mood and Affect: Mood normal.         Behavior: Behavior normal.

## 2023-12-26 ENCOUNTER — TELEPHONE (OUTPATIENT)
Dept: UROLOGY | Facility: AMBULATORY SURGERY CENTER | Age: 48
End: 2023-12-26

## 2023-12-26 NOTE — TELEPHONE ENCOUNTER
Tried calling patient at following number regarding obtaining his psa lab work prior to appointment however after it rings it goes to dead air. Will try again later.

## 2023-12-28 ENCOUNTER — OFFICE VISIT (OUTPATIENT)
Dept: UROLOGY | Facility: CLINIC | Age: 48
End: 2023-12-28
Payer: COMMERCIAL

## 2023-12-28 VITALS
HEART RATE: 93 BPM | BODY MASS INDEX: 37.41 KG/M2 | HEIGHT: 66 IN | WEIGHT: 232.8 LBS | OXYGEN SATURATION: 97 % | SYSTOLIC BLOOD PRESSURE: 138 MMHG | DIASTOLIC BLOOD PRESSURE: 84 MMHG

## 2023-12-28 DIAGNOSIS — N40.1 BENIGN LOCALIZED PROSTATIC HYPERPLASIA WITH LOWER URINARY TRACT SYMPTOMS (LUTS): ICD-10-CM

## 2023-12-28 PROCEDURE — 99213 OFFICE O/P EST LOW 20 MIN: CPT | Performed by: PHYSICIAN ASSISTANT

## 2023-12-28 RX ORDER — TAMSULOSIN HYDROCHLORIDE 0.4 MG/1
0.4 CAPSULE ORAL
Qty: 90 CAPSULE | Refills: 3 | Status: SHIPPED | OUTPATIENT
Start: 2023-12-28 | End: 2024-03-27

## 2023-12-28 NOTE — PROGRESS NOTES
UROLOGY PROGRESS NOTE   Patient Identifiers: Cosmo Schwab (MRN [id number])  Date of Service: 12/28/2023    Subjective:   48-year-old man history of prostate hypertrophy on Flomax.  He had elevated PSA of 4.6.  Multiparametric MRI in August showed PI-RADS 2.  With moderate enlargement up to 75 g.    Reason for visit: Elevated PSA follow-up    Objective:     VITALS:    There were no vitals filed for this visit.        LABS:  Lab Results   Component Value Date    HGB 15.6 11/01/2022    HCT 44.9 11/01/2022    WBC 4.5 11/01/2022     (L) 11/01/2022   ]    Lab Results   Component Value Date    K 4.9 07/21/2023     07/21/2023    CO2 25 07/21/2023    BUN 13 07/21/2023    CREATININE 0.85 07/21/2023   ]        INPATIENT MEDS:    Current Outpatient Medications:     aspirin 81 mg chewable tablet, Chew 81 mg daily, Disp: , Rfl:     atenolol (TENORMIN) 50 mg tablet, Take 1 tablet (50 mg total) by mouth daily, Disp: 90 tablet, Rfl: 1    lisinopril (ZESTRIL) 40 mg tablet, Take 1 tablet (40 mg total) by mouth daily, Disp: 90 tablet, Rfl: 1    multivitamin (THERAGRAN) TABS, Take 2 tablets by mouth daily, Disp: , Rfl:     tamsulosin (FLOMAX) 0.4 mg, Take 1 capsule (0.4 mg total) by mouth daily at bedtime, Disp: 90 capsule, Rfl: 3    Vitamin D, Cholecalciferol, 50 MCG (2000 UT) CAPS, Take 1 capsule by mouth daily, Disp: , Rfl:       Physical Exam:   There were no vitals taken for this visit.  GEN: no acute distress    RESP: breathing comfortably with no accessory muscle use    ABD: soft, non-tender, non-distended   INCISION:    EXT: no significant peripheral edema       RADIOLOGY:    IMPRESSION:     1. PI-RADSv2.1 Category 2 - Low (clinically significant cancer is unlikely to be present).     2. Moderate BPH with calculated prostate volume of  75   cc.    Assessment:   #1.  Elevated PSA  #2.  Prostate hypertrophy  3.  ED    Plan:   -PSA with primary care  -Reorder tamsulosin  -Follow-up in 6 months  -

## 2024-02-28 DIAGNOSIS — N40.1 BENIGN LOCALIZED PROSTATIC HYPERPLASIA WITH LOWER URINARY TRACT SYMPTOMS (LUTS): ICD-10-CM

## 2024-02-28 RX ORDER — TAMSULOSIN HYDROCHLORIDE 0.4 MG/1
0.4 CAPSULE ORAL
Qty: 90 CAPSULE | Refills: 1 | Status: SHIPPED | OUTPATIENT
Start: 2024-02-28

## 2024-03-07 ENCOUNTER — RA CDI HCC (OUTPATIENT)
Dept: OTHER | Facility: HOSPITAL | Age: 49
End: 2024-03-07

## 2024-03-07 NOTE — PROGRESS NOTES
HCC coding opportunities          Chart Reviewed number of suggestions sent to Provider: 1  E66.01     Patients Insurance        Commercial Insurance: [employer]

## 2024-05-28 ENCOUNTER — TELEPHONE (OUTPATIENT)
Dept: INTERNAL MEDICINE CLINIC | Facility: CLINIC | Age: 49
End: 2024-05-28

## 2024-05-28 DIAGNOSIS — I10 ESSENTIAL HYPERTENSION: ICD-10-CM

## 2024-05-28 RX ORDER — LISINOPRIL 40 MG/1
40 TABLET ORAL DAILY
Qty: 90 TABLET | Refills: 0 | Status: SHIPPED | OUTPATIENT
Start: 2024-05-28

## 2024-05-28 NOTE — TELEPHONE ENCOUNTER
Patient called to request a refill for their Lisinopril advised a refill was requested on 05/28/2024 and is pending approval. Patient verbalized understanding and is in agreement.

## 2024-06-03 ENCOUNTER — TELEPHONE (OUTPATIENT)
Dept: INTERNAL MEDICINE CLINIC | Facility: CLINIC | Age: 49
End: 2024-06-03

## 2024-06-03 ENCOUNTER — PATIENT MESSAGE (OUTPATIENT)
Dept: INTERNAL MEDICINE CLINIC | Facility: CLINIC | Age: 49
End: 2024-06-03

## 2024-06-25 ENCOUNTER — TELEPHONE (OUTPATIENT)
Age: 49
End: 2024-06-25

## 2024-06-25 NOTE — TELEPHONE ENCOUNTER
Pt called in regards to a cancelled appointment on 7/1 due to a change in the provider's schedule. Pt was offered 6/26 or 6/27 but declined due to not having labs done. Provider's next available is then in December, please review for appropriate follow up appointment.     Call back: [phone]

## 2024-07-17 ENCOUNTER — APPOINTMENT (OUTPATIENT)
Dept: LAB | Facility: HOSPITAL | Age: 49
End: 2024-07-17
Payer: COMMERCIAL

## 2024-07-17 DIAGNOSIS — K21.00 GASTROESOPHAGEAL REFLUX DISEASE WITH ESOPHAGITIS WITHOUT HEMORRHAGE: ICD-10-CM

## 2024-07-17 DIAGNOSIS — E55.9 VITAMIN D DEFICIENCY: ICD-10-CM

## 2024-07-17 DIAGNOSIS — R73.9 HYPERGLYCEMIA: ICD-10-CM

## 2024-07-17 DIAGNOSIS — M32.9 SLE (SYSTEMIC LUPUS ERYTHEMATOSUS RELATED SYNDROME) (HCC): ICD-10-CM

## 2024-07-17 DIAGNOSIS — D69.6 THROMBOCYTOPENIA (HCC): ICD-10-CM

## 2024-07-17 DIAGNOSIS — E78.2 MIXED HYPERLIPIDEMIA: ICD-10-CM

## 2024-07-17 DIAGNOSIS — R97.20 ELEVATED PSA: ICD-10-CM

## 2024-07-17 DIAGNOSIS — I10 ESSENTIAL HYPERTENSION: ICD-10-CM

## 2024-07-17 LAB
25(OH)D3 SERPL-MCNC: 55.7 NG/ML (ref 30–100)
ALBUMIN SERPL BCG-MCNC: 4.3 G/DL (ref 3.5–5)
ALP SERPL-CCNC: 59 U/L (ref 34–104)
ALT SERPL W P-5'-P-CCNC: 30 U/L (ref 7–52)
ANION GAP SERPL CALCULATED.3IONS-SCNC: 6 MMOL/L (ref 4–13)
AST SERPL W P-5'-P-CCNC: 21 U/L (ref 13–39)
BACTERIA UR QL AUTO: ABNORMAL /HPF
BASOPHILS # BLD AUTO: 0.03 THOUSANDS/ÂΜL (ref 0–0.1)
BASOPHILS NFR BLD AUTO: 1 % (ref 0–1)
BILIRUB SERPL-MCNC: 0.89 MG/DL (ref 0.2–1)
BILIRUB UR QL STRIP: NEGATIVE
BUN SERPL-MCNC: 16 MG/DL (ref 5–25)
CALCIUM SERPL-MCNC: 9.6 MG/DL (ref 8.4–10.2)
CHLORIDE SERPL-SCNC: 106 MMOL/L (ref 96–108)
CHOLEST SERPL-MCNC: 149 MG/DL
CLARITY UR: CLEAR
CO2 SERPL-SCNC: 29 MMOL/L (ref 21–32)
COLOR UR: YELLOW
CREAT SERPL-MCNC: 0.95 MG/DL (ref 0.6–1.3)
EOSINOPHIL # BLD AUTO: 0.13 THOUSAND/ÂΜL (ref 0–0.61)
EOSINOPHIL NFR BLD AUTO: 2 % (ref 0–6)
ERYTHROCYTE [DISTWIDTH] IN BLOOD BY AUTOMATED COUNT: 12.3 % (ref 11.6–15.1)
EST. AVERAGE GLUCOSE BLD GHB EST-MCNC: 108 MG/DL
GFR SERPL CREATININE-BSD FRML MDRD: 92 ML/MIN/1.73SQ M
GLUCOSE P FAST SERPL-MCNC: 105 MG/DL (ref 65–99)
GLUCOSE UR STRIP-MCNC: NEGATIVE MG/DL
HBA1C MFR BLD: 5.4 %
HCT VFR BLD AUTO: 44.3 % (ref 36.5–49.3)
HDLC SERPL-MCNC: 32 MG/DL
HGB BLD-MCNC: 15.6 G/DL (ref 12–17)
HGB UR QL STRIP.AUTO: NEGATIVE
IMM GRANULOCYTES # BLD AUTO: 0.05 THOUSAND/UL (ref 0–0.2)
IMM GRANULOCYTES NFR BLD AUTO: 1 % (ref 0–2)
KETONES UR STRIP-MCNC: NEGATIVE MG/DL
LDLC SERPL CALC-MCNC: 92 MG/DL (ref 0–100)
LEUKOCYTE ESTERASE UR QL STRIP: ABNORMAL
LYMPHOCYTES # BLD AUTO: 1.46 THOUSANDS/ÂΜL (ref 0.6–4.47)
LYMPHOCYTES NFR BLD AUTO: 27 % (ref 14–44)
MCH RBC QN AUTO: 31.4 PG (ref 26.8–34.3)
MCHC RBC AUTO-ENTMCNC: 35.2 G/DL (ref 31.4–37.4)
MCV RBC AUTO: 89 FL (ref 82–98)
MONOCYTES # BLD AUTO: 0.49 THOUSAND/ÂΜL (ref 0.17–1.22)
MONOCYTES NFR BLD AUTO: 9 % (ref 4–12)
MUCOUS THREADS UR QL AUTO: ABNORMAL
NEUTROPHILS # BLD AUTO: 3.22 THOUSANDS/ÂΜL (ref 1.85–7.62)
NEUTS SEG NFR BLD AUTO: 60 % (ref 43–75)
NITRITE UR QL STRIP: NEGATIVE
NON-SQ EPI CELLS URNS QL MICRO: ABNORMAL /HPF
NONHDLC SERPL-MCNC: 117 MG/DL
NRBC BLD AUTO-RTO: 0 /100 WBCS
PH UR STRIP.AUTO: 6 [PH]
PLATELET # BLD AUTO: 129 THOUSANDS/UL (ref 149–390)
PMV BLD AUTO: 11.2 FL (ref 8.9–12.7)
POTASSIUM SERPL-SCNC: 4.2 MMOL/L (ref 3.5–5.3)
PROT SERPL-MCNC: 6.8 G/DL (ref 6.4–8.4)
PROT UR STRIP-MCNC: NEGATIVE MG/DL
PSA SERPL-MCNC: 6.05 NG/ML (ref 0–4)
RBC # BLD AUTO: 4.97 MILLION/UL (ref 3.88–5.62)
RBC #/AREA URNS AUTO: ABNORMAL /HPF
SODIUM SERPL-SCNC: 141 MMOL/L (ref 135–147)
SP GR UR STRIP.AUTO: >=1.03 (ref 1–1.03)
TRIGL SERPL-MCNC: 127 MG/DL
UROBILINOGEN UR QL STRIP.AUTO: 0.2 E.U./DL
WBC # BLD AUTO: 5.38 THOUSAND/UL (ref 4.31–10.16)
WBC #/AREA URNS AUTO: ABNORMAL /HPF

## 2024-07-17 PROCEDURE — 80061 LIPID PANEL: CPT

## 2024-07-17 PROCEDURE — 36415 COLL VENOUS BLD VENIPUNCTURE: CPT

## 2024-07-17 PROCEDURE — 83036 HEMOGLOBIN GLYCOSYLATED A1C: CPT

## 2024-07-17 PROCEDURE — 85025 COMPLETE CBC W/AUTO DIFF WBC: CPT

## 2024-07-17 PROCEDURE — 80053 COMPREHEN METABOLIC PANEL: CPT

## 2024-07-17 PROCEDURE — 82306 VITAMIN D 25 HYDROXY: CPT

## 2024-07-17 PROCEDURE — 84153 ASSAY OF PSA TOTAL: CPT

## 2024-07-23 ENCOUNTER — RA CDI HCC (OUTPATIENT)
Dept: OTHER | Facility: HOSPITAL | Age: 49
End: 2024-07-23

## 2024-07-23 DIAGNOSIS — E66.01 MORBID OBESITY (HCC): Primary | ICD-10-CM

## 2024-07-23 NOTE — PROGRESS NOTES
HCC coding opportunities          Chart Reviewed number of suggestions sent to Provider: 1     Patients Insurance        Commercial Insurance: [employer]     E66.01

## 2024-07-24 ENCOUNTER — OFFICE VISIT (OUTPATIENT)
Dept: UROLOGY | Facility: CLINIC | Age: 49
End: 2024-07-24
Payer: COMMERCIAL

## 2024-07-24 VITALS
HEIGHT: 66 IN | HEART RATE: 85 BPM | SYSTOLIC BLOOD PRESSURE: 132 MMHG | OXYGEN SATURATION: 97 % | DIASTOLIC BLOOD PRESSURE: 74 MMHG | BODY MASS INDEX: 36.16 KG/M2 | WEIGHT: 225 LBS

## 2024-07-24 DIAGNOSIS — R97.20 ELEVATED PSA: ICD-10-CM

## 2024-07-24 DIAGNOSIS — N28.1 KIDNEY CYST, ACQUIRED: Primary | ICD-10-CM

## 2024-07-24 DIAGNOSIS — N39.0 URINARY TRACT INFECTION WITHOUT HEMATURIA, SITE UNSPECIFIED: ICD-10-CM

## 2024-07-24 PROCEDURE — 99213 OFFICE O/P EST LOW 20 MIN: CPT | Performed by: PHYSICIAN ASSISTANT

## 2024-07-24 RX ORDER — CEPHALEXIN 500 MG/1
500 CAPSULE ORAL EVERY 12 HOURS SCHEDULED
Qty: 14 CAPSULE | Refills: 0 | Status: SHIPPED | OUTPATIENT
Start: 2024-07-24 | End: 2024-07-31

## 2024-07-24 NOTE — PROGRESS NOTES
"  UROLOGY PROGRESS NOTE   Patient Identifiers: Cosmo Schwab (MRN [id number])  Date of Service: 7/24/2024    Subjective:   49-year-old man history of BPH.  He had an elevated PSA and MRI showing PI-RADS 2.  Prostate was 75 g.  Prior PSA 4.6.  Current PSA 6.0.  Recent urinalysis showed some inflammatory cells although he is asymptomatic.  He also reports sexual activity a few days prior to the PSA test.    Reason for visit: Elevated PSA follow-up    Objective:     VITALS:    Vitals:    07/24/24 1518   BP: 132/74   Pulse: 85   SpO2: 97%           LABS:  Lab Results   Component Value Date    HGB 15.6 07/17/2024    HCT 44.3 07/17/2024    WBC 5.38 07/17/2024     (L) 07/17/2024   ]    Lab Results   Component Value Date    K 4.2 07/17/2024     07/17/2024    CO2 29 07/17/2024    BUN 16 07/17/2024    CREATININE 0.95 07/17/2024    CALCIUM 9.6 07/17/2024   ]        INPATIENT MEDS:    Current Outpatient Medications:     aspirin 81 mg chewable tablet, Chew 81 mg daily, Disp: , Rfl:     atenolol (TENORMIN) 50 mg tablet, Take 1 tablet (50 mg total) by mouth daily, Disp: 90 tablet, Rfl: 1    lisinopril (ZESTRIL) 40 mg tablet, Take 1 tablet (40 mg total) by mouth daily, Disp: 90 tablet, Rfl: 0    multivitamin (THERAGRAN) TABS, Take 2 tablets by mouth daily, Disp: , Rfl:     tamsulosin (FLOMAX) 0.4 mg, TAKE ONE CAPSULE BY MOUTH EVERY DAY AT BEDTIME, Disp: 90 capsule, Rfl: 1    Vitamin D, Cholecalciferol, 50 MCG (2000 UT) CAPS, Take 1 capsule by mouth daily, Disp: , Rfl:       Physical Exam:   /74 (BP Location: Left arm, Patient Position: Sitting, Cuff Size: Standard)   Pulse 85   Ht 5' 6\" (1.676 m)   Wt 102 kg (225 lb)   SpO2 97%   BMI 36.32 kg/m²   GEN: no acute distress    RESP: breathing comfortably with no accessory muscle use    ABD: soft, non-tender, non-distended   INCISION:    EXT: no significant peripheral edema     RADIOLOGY:   IMPRESSION:  1. One sharply circumscribed subcentimeter renal " hypodensity measuring 7 mm x 4 mm in the right kidney corresponding to that seen by ultrasound in October. This lesion is too small to accurately characterize, but is statistically most likely to represent   benign cortical renal cyst.  According to the guidelines published in the White Paper of the ACR Incidental Findings Committee (Radiology 2010), no further workup of these types of lesions is recommended. If clinical concern continues to exist, I would   recommend follow-up with ultrasound rather than CT to avoid unnecessary radiation.    Assessment:   #1.  Elevated PSA  #2.  Right kidney cyst    Plan:   -I put him on antibiotics for a week and will repeat his PSA in 6 weeks if remains elevated will need a office biopsy.  If returns to baseline he will follow-up in 6 months  -He had a too small to characterize renal hypodensity which required follow-up that is overdue  -He will have a kidney and bladder ultrasound in 6 weeks as well  -

## 2024-07-30 ENCOUNTER — OFFICE VISIT (OUTPATIENT)
Dept: INTERNAL MEDICINE CLINIC | Facility: CLINIC | Age: 49
End: 2024-07-30
Payer: COMMERCIAL

## 2024-07-30 VITALS
HEIGHT: 66 IN | SYSTOLIC BLOOD PRESSURE: 120 MMHG | DIASTOLIC BLOOD PRESSURE: 80 MMHG | RESPIRATION RATE: 18 BRPM | TEMPERATURE: 98.3 F | BODY MASS INDEX: 35.84 KG/M2 | WEIGHT: 223 LBS | HEART RATE: 80 BPM | OXYGEN SATURATION: 97 %

## 2024-07-30 DIAGNOSIS — E78.2 MIXED HYPERLIPIDEMIA: ICD-10-CM

## 2024-07-30 DIAGNOSIS — L71.9 ROSACEA: ICD-10-CM

## 2024-07-30 DIAGNOSIS — D69.6 THROMBOCYTOPENIA (HCC): ICD-10-CM

## 2024-07-30 DIAGNOSIS — I10 ESSENTIAL HYPERTENSION: ICD-10-CM

## 2024-07-30 DIAGNOSIS — R73.9 HYPERGLYCEMIA: ICD-10-CM

## 2024-07-30 DIAGNOSIS — K63.5 POLYP OF COLON, UNSPECIFIED PART OF COLON, UNSPECIFIED TYPE: ICD-10-CM

## 2024-07-30 DIAGNOSIS — Z00.00 ANNUAL PHYSICAL EXAM: Primary | ICD-10-CM

## 2024-07-30 DIAGNOSIS — R97.20 ELEVATED PSA: ICD-10-CM

## 2024-07-30 DIAGNOSIS — E55.9 VITAMIN D DEFICIENCY: ICD-10-CM

## 2024-07-30 DIAGNOSIS — M32.9 SLE (SYSTEMIC LUPUS ERYTHEMATOSUS RELATED SYNDROME) (HCC): ICD-10-CM

## 2024-07-30 PROCEDURE — 99213 OFFICE O/P EST LOW 20 MIN: CPT | Performed by: INTERNAL MEDICINE

## 2024-07-30 PROCEDURE — 99396 PREV VISIT EST AGE 40-64: CPT | Performed by: INTERNAL MEDICINE

## 2024-07-30 NOTE — PROGRESS NOTES
Adult Annual Physical  Name: Cosmo Schwab      : 1975      MRN: [id number]  Encounter Provider: Tara Caceres MD  Encounter Date: 2024   Encounter department: [facility] INTERNAL MEDICINE    Assessment & Plan   1. Annual physical exam  2. Essential hypertension  -     Basic metabolic panel; Future  -     CBC and differential; Future  3. Polyp of colon, unspecified part of colon, unspecified type  4. Thrombocytopenia (HCC)  -     CBC and differential; Future  5. SLE (systemic lupus erythematosus related syndrome) (HCC)  6. Elevated PSA  7. Mixed hyperlipidemia  8. Hyperglycemia  -     Basic metabolic panel; Future  9. Vitamin D deficiency  10. BMI 35.0-35.9,adult  Assessment & Plan:  Patient  was advised to decrease portion size.  Advised to decrease carb, sugar, cholesterol intake.  Advised to exercise 3-5 times per week.  Advised to lose weight.  11. Rosacea    Discussion/summary/plan:    Blood pressure well-controlled advised to continue present medications and low-salt diet.  He had a colonoscopy May 2023 was advised follow-up after 5 years.  He has a chronic thrombocytopenia platelet count stable and improved from 2022.  Platelet counts 129 K with normal WBC and hemoglobin so we will observe and follow.  Presently patient is asymptomatic.  Had seen hematology in the past.  Has a chronic rash on the face most likely rosacea was seen by dermatologist uses cream as needed.  Hyperlipidemia much better his cholesterol now 149, triglyceride 127, HDL 32, LDL 92 so advised to continue low-cholesterol low-carb diet exercise and lose weight.  He has elevated PSA has been seen and followed by urologist started on antibiotic for possible UTI and will have follow-up PSA and also is going for ultrasound the kidneys and bladder as well.  His fasting blood sugar 105 but hemoglobin A1c is 5.4 advised to watch diet for sugar and carbs intake.  Vitamin D deficiency  resolved vitamin D level 55 so advised to continue same dose of vitamin D supplement.    Immunizations and preventive care screenings were discussed with patient today. Appropriate education was printed on patient's after visit summary.    Discussed risks and benefits of prostate cancer screening. We discussed the controversial history of PSA screening for prostate cancer in the [place] as well as the risk of over detection and over treatment of prostate cancer by way of PSA screening.  The patient understands that PSA blood testing is an imperfect way to screen for prostate cancer and that elevated PSA levels in the blood may also be caused by infection, inflammation, prostatic trauma or manipulation, urological procedures, or by benign prostatic enlargement.    The role of the digital rectal examination in prostate cancer screening was also discussed and I discussed with him that there is large interobserver variability in the findings of digital rectal examination.    Counseling:  Alcohol/drug use: discussed moderation in alcohol intake, the recommendations for healthy alcohol use, and avoidance of illicit drug use.  Dental Health: discussed importance of regular tooth brushing, flossing, and dental visits.  Injury prevention: discussed safety/seat belts, safety helmets, smoke detectors, carbon dioxide detectors, and smoking near bedding or upholstery.  Sexual health: discussed sexually transmitted diseases, partner selection, use of condoms, avoidance of unintended pregnancy, and contraceptive alternatives.  Exercise: the importance of regular exercise/physical activity was discussed. Recommend exercise 3-5 times per week for at least 30 minutes.          History of Present Illness     Patient presents for annual wellness exam as well as follow-up his medical problems.      Adult Annual Physical  Review of Systems   Constitutional:  Negative for chills, fatigue and fever.   HENT:  Negative for congestion,  ear pain, hearing loss, nosebleeds, sinus pain, sore throat and trouble swallowing.    Eyes:  Negative for discharge, redness and visual disturbance.   Respiratory:  Negative for cough, chest tightness and shortness of breath.    Cardiovascular:  Negative for chest pain and palpitations.   Gastrointestinal:  Negative for abdominal pain, blood in stool, constipation, diarrhea, nausea and vomiting.   Genitourinary:  Negative for dysuria, flank pain and hematuria.   Musculoskeletal:  Negative for arthralgias, myalgias and neck pain.   Skin:  Positive for rash (Gets rash on the face). Negative for color change.   Neurological:  Negative for dizziness, speech difficulty, weakness and headaches.   Hematological:  Does not bruise/bleed easily.   Psychiatric/Behavioral:  Negative for agitation and behavioral problems.        Pertinent Medical History         Medical History Reviewed by provider this encounter:  Tobacco       Current Outpatient Medications on File Prior to Visit   Medication Sig Dispense Refill   • aspirin 81 mg chewable tablet Chew 81 mg daily     • atenolol (TENORMIN) 50 mg tablet Take 1 tablet (50 mg total) by mouth daily 90 tablet 1   • cephalexin (KEFLEX) 500 mg capsule Take 1 capsule (500 mg total) by mouth every 12 (twelve) hours for 7 days 14 capsule 0   • lisinopril (ZESTRIL) 40 mg tablet Take 1 tablet (40 mg total) by mouth daily 90 tablet 0   • multivitamin (THERAGRAN) TABS Take 2 tablets by mouth daily     • tamsulosin (FLOMAX) 0.4 mg TAKE ONE CAPSULE BY MOUTH EVERY DAY AT BEDTIME 90 capsule 1   • Vitamin D, Cholecalciferol, 50 MCG (2000 UT) CAPS Take 1 capsule by mouth daily       No current facility-administered medications on file prior to visit.      Social History     Tobacco Use   • Smoking status: Former     Types: Cigars   • Smokeless tobacco: Never   Vaping Use   • Vaping status: Never Used   Substance and Sexual Activity   • Alcohol use: Yes     Comment: Occasional - As per  "[employer]-wine or beer   • Drug use: Yes     Types: Marijuana     Comment: medical card   • Sexual activity: Yes       Objective     /80 (BP Location: Left arm, Patient Position: Sitting, Cuff Size: Large)   Pulse 80   Temp 98.3 °F (36.8 °C) (Temporal)   Resp 18   Ht 5' 6\" (1.676 m)   Wt 101 kg (223 lb)   SpO2 97%   BMI 35.99 kg/m²     Physical Exam  Vitals and nursing note reviewed.   Constitutional:       General: He is not in acute distress.     Appearance: He is well-developed. He is obese.   HENT:      Head: Normocephalic and atraumatic.      Right Ear: Ear canal and external ear normal.      Left Ear: Ear canal and external ear normal.      Nose: Nose normal.      Mouth/Throat:      Mouth: Mucous membranes are moist.      Pharynx: Oropharynx is clear.   Eyes:      General: No scleral icterus.        Right eye: No discharge.         Left eye: No discharge.      Extraocular Movements: Extraocular movements intact.      Conjunctiva/sclera: Conjunctivae normal.   Cardiovascular:      Rate and Rhythm: Normal rate and regular rhythm.      Pulses: Normal pulses.      Heart sounds: Normal heart sounds. No murmur heard.  Pulmonary:      Effort: Pulmonary effort is normal. No respiratory distress.      Breath sounds: Normal breath sounds. No wheezing, rhonchi or rales.   Abdominal:      General: Bowel sounds are normal. There is no distension.      Palpations: Abdomen is soft.      Tenderness: There is no abdominal tenderness.   Musculoskeletal:         General: No swelling. Normal range of motion.      Cervical back: Normal range of motion and neck supple.      Right lower leg: No edema.      Left lower leg: No edema.   Skin:     General: Skin is warm and dry.      Capillary Refill: Capillary refill takes less than 2 seconds.      Findings: Rash (Has a slightly erythematous macular rash on the nose and face area.) present.   Neurological:      General: No focal deficit present.      Mental Status: He " is alert and oriented to person, place, and time.      Motor: No weakness.      Coordination: Coordination normal.   Psychiatric:         Mood and Affect: Mood normal.         Behavior: Behavior normal.          No

## 2024-07-30 NOTE — PATIENT INSTRUCTIONS
Patient was advised to continue present medications. discussed with the patient medications and laboratory data in detail.  Follow-up with me in 6 months or as advised.  If any blood test was ordered please do 1 week prior to next appointment unless advise to get earlier.  If you have any questions please call the office [phone]

## 2024-08-02 ENCOUNTER — HOSPITAL ENCOUNTER (OUTPATIENT)
Dept: ULTRASOUND IMAGING | Facility: HOSPITAL | Age: 49
End: 2024-08-02
Payer: COMMERCIAL

## 2024-08-02 DIAGNOSIS — N28.1 KIDNEY CYST, ACQUIRED: ICD-10-CM

## 2024-08-02 PROCEDURE — 76775 US EXAM ABDO BACK WALL LIM: CPT

## 2024-08-09 ENCOUNTER — APPOINTMENT (OUTPATIENT)
Dept: LAB | Facility: HOSPITAL | Age: 49
End: 2024-08-09
Payer: COMMERCIAL

## 2024-08-09 DIAGNOSIS — R97.20 ELEVATED PSA: ICD-10-CM

## 2024-08-09 LAB — PSA SERPL-MCNC: 6.11 NG/ML (ref 0–4)

## 2024-08-09 PROCEDURE — 84153 ASSAY OF PSA TOTAL: CPT

## 2024-08-09 PROCEDURE — 36415 COLL VENOUS BLD VENIPUNCTURE: CPT

## 2024-08-13 ENCOUNTER — TELEPHONE (OUTPATIENT)
Age: 49
End: 2024-08-13

## 2024-08-13 NOTE — TELEPHONE ENCOUNTER
Last seen by Lizandro at end of July. Plan per Lizandro-    Plan:   -I put him on antibiotics for a week and will repeat his PSA in 6 weeks if remains elevated will need a office biopsy.  If returns to baseline he will follow-up in 6 months  -He had a too small to characterize renal hypodensity which required follow-up that is overdue  -He will have a kidney and bladder ultrasound in 6 weeks as well      Please review PSA

## 2024-08-13 NOTE — TELEPHONE ENCOUNTER
Pt under care of: Lizandro    Pt calling due to:    Patient calling in for results of PSA from 8/9/24. Requesting phone call back with results and recommendations.         Pt can be reached at: PSA results

## 2024-08-14 NOTE — TELEPHONE ENCOUNTER
Please call to schedule next available biopsy. I believe he is a patient of Dr. Watson so it can be either here or the [facility]

## 2024-08-14 NOTE — TELEPHONE ENCOUNTER
LEFT MESSAGE TO CALL BACK AND SCHEDULE. DR SANDERS HAS AN OPENING [facility] AT THE [place] OFFICE ON 10/2/24 IF STILL AVAILABLE. IF NOT, NEXT AVAILABLE AT [place] OR [place] THEN.

## 2024-08-29 ENCOUNTER — APPOINTMENT (EMERGENCY)
Dept: RADIOLOGY | Facility: HOSPITAL | Age: 49
End: 2024-08-29
Payer: COMMERCIAL

## 2024-08-29 ENCOUNTER — HOSPITAL ENCOUNTER (EMERGENCY)
Facility: HOSPITAL | Age: 49
Discharge: HOME | End: 2024-08-29
Attending: EMERGENCY MEDICINE
Payer: COMMERCIAL

## 2024-08-29 VITALS
HEART RATE: 89 BPM | RESPIRATION RATE: 18 BRPM | OXYGEN SATURATION: 97 % | SYSTOLIC BLOOD PRESSURE: 129 MMHG | TEMPERATURE: 98.8 F | DIASTOLIC BLOOD PRESSURE: 73 MMHG

## 2024-08-29 DIAGNOSIS — J18.9 PNEUMONIA: Primary | ICD-10-CM

## 2024-08-29 PROCEDURE — 99284 EMERGENCY DEPT VISIT MOD MDM: CPT | Performed by: EMERGENCY MEDICINE

## 2024-08-29 PROCEDURE — 71046 X-RAY EXAM CHEST 2 VIEWS: CPT

## 2024-08-29 PROCEDURE — 99283 EMERGENCY DEPT VISIT LOW MDM: CPT

## 2024-08-29 RX ORDER — FLUTICASONE PROPIONATE 50 MCG
1 SPRAY, SUSPENSION (ML) NASAL DAILY
Status: DISCONTINUED | OUTPATIENT
Start: 2024-08-29 | End: 2024-08-29 | Stop reason: HOSPADM

## 2024-08-29 RX ORDER — GUAIFENESIN/DEXTROMETHORPHAN 100-10MG/5
10 SYRUP ORAL ONCE
Status: COMPLETED | OUTPATIENT
Start: 2024-08-29 | End: 2024-08-29

## 2024-08-29 RX ORDER — GUAIFENESIN/DEXTROMETHORPHAN 100-10MG/5
5 SYRUP ORAL 4 TIMES DAILY PRN
Qty: 118 ML | Refills: 0 | Status: SHIPPED | OUTPATIENT
Start: 2024-08-29

## 2024-08-29 RX ADMIN — AMOXICILLIN AND CLAVULANATE POTASSIUM 1 TABLET: 875; 125 TABLET, FILM COATED ORAL at 17:55

## 2024-08-29 RX ADMIN — FLUTICASONE PROPIONATE 1 SPRAY: 50 SPRAY, METERED NASAL at 17:41

## 2024-08-29 RX ADMIN — GUAIFENESIN AND DEXTROMETHORPHAN 10 ML: 100; 10 SYRUP ORAL at 17:31

## 2024-08-29 NOTE — ED PROVIDER NOTES
History  Chief Complaint   Patient presents with    Cough     Patient states he was sick between August 13-15th with flu like symptoms. All symptoms resolved besides cough. Patient now has productive cough. Called PCP no available appointments, PCP requesting chest XR to rule out pneumonia.      49-year-old male comes in for evaluation of cough patient states approximately 2 weeks ago he had a viral URI which seem to clear however he continues to cough and is concerned he may have to have pneumonia.  No fevers at this time called PCP who told him to come to the emergency department and get a chest x-ray.      History provided by:  Patient   used: No    Cough  Cough characteristics:  Non-productive  Sputum characteristics:  Nondescript  Severity:  Moderate  Onset quality:  Gradual  Duration:  2 weeks  Timing:  Constant  Progression:  Worsening  Chronicity:  New  Smoker: no    Context: upper respiratory infection    Relieved by:  None tried  Associated symptoms: no chest pain, no eye discharge, no fever, no headaches, no rash, no shortness of breath and no wheezing    Risk factors: recent infection        Prior to Admission Medications   Prescriptions Last Dose Informant Patient Reported? Taking?   Vitamin D, Cholecalciferol, 50 MCG (2000 UT) CAPS 8/28/2024 Self Yes Yes   Sig: Take 1 capsule by mouth daily   aspirin 81 mg chewable tablet 8/29/2024 Self Yes Yes   Sig: Chew 81 mg daily   atenolol (TENORMIN) 50 mg tablet 8/28/2024 Self No Yes   Sig: Take 1 tablet (50 mg total) by mouth daily   lisinopril (ZESTRIL) 40 mg tablet 8/28/2024  No Yes   Sig: Take 1 tablet (40 mg total) by mouth daily   multivitamin (THERAGRAN) TABS 8/28/2024 Self Yes Yes   Sig: Take 2 tablets by mouth daily   tamsulosin (FLOMAX) 0.4 mg 8/28/2024  No Yes   Sig: TAKE ONE CAPSULE BY MOUTH EVERY DAY AT BEDTIME      Facility-Administered Medications: None       Past Medical History:   Diagnosis Date    Allergic rhinitis      Annual physical exam 07/30/2024    BMI 34.0-34.9,adult 03/07/2022    BMI 35.0-35.9,adult 12/03/2021    BMI 35.0-35.9,adult 07/30/2024    BMI 36.0-36.9,adult 08/31/2021    BMI 37.0-37.9, adult 11/29/2023    Colon polyps 12/03/2021    Constipation     Elevated ALT measurement 12/06/2022    Elevated liver enzymes     Enlarged prostate 12/06/2022    Erectile dysfunction 12/06/2022    Essential hypertension 02/27/2021    Fatigue     Gastric polyps     history of    Gastroesophageal reflux disease with esophagitis without hemorrhage 12/03/2021    GERD (gastroesophageal reflux disease)     Herpes zoster without complication 04/11/2023    History of shingles     Hyperglycemia 02/27/2021    Hypertension     Lupus (HCC)     Mixed hyperlipidemia 02/27/2021    Nocturia 12/06/2022    Obesity     Renal lesion 12/06/2022    Rosacea     Rosacea 03/07/2022    Seizures (HCC)     8 year old sport injury only-no recurrance    Skin lesion 08/31/2021    Skin rash 12/07/2022    SLE (systemic lupus erythematosus related syndrome) (HCC) 02/27/2021    Systemic lupus erythematosus (HCC)     Thrombocytopenia (HCC) 02/27/2021    Vitamin D deficiency 02/27/2021       Past Surgical History:   Procedure Laterality Date    COLONOSCOPY  01/12/2018    by Dr. David; needs f/u 1/2023     EGD      SKIN BIOPSY         Family History   Problem Relation Age of Onset    Dementia Mother     Colon cancer Father     Cancer Father         colon    Lung cancer Paternal Grandfather     Colon cancer Paternal Aunt     Skin cancer Paternal Uncle     Colon cancer Maternal Aunt      I have reviewed and agree with the history as documented.    E-Cigarette/Vaping    E-Cigarette Use Never User      E-Cigarette/Vaping Substances    Nicotine No     THC No     CBD No     Flavoring No     Other No     Unknown No      Social History     Tobacco Use    Smoking status: Former     Types: Cigars    Smokeless tobacco: Never   Vaping Use    Vaping status: Never Used   Substance  Use Topics    Alcohol use: Yes     Comment: Occasional - As per [employer]-wine or beer    Drug use: Yes     Types: Marijuana     Comment: medical card       Review of Systems   Constitutional:  Negative for activity change, appetite change and fever.   HENT:  Negative for congestion and facial swelling.    Eyes:  Negative for discharge and redness.   Respiratory:  Positive for cough. Negative for shortness of breath and wheezing.    Cardiovascular:  Negative for chest pain and leg swelling.   Gastrointestinal:  Negative for abdominal distention, abdominal pain and blood in stool.   Endocrine: Negative for cold intolerance and polydipsia.   Genitourinary:  Negative for difficulty urinating and hematuria.   Musculoskeletal:  Negative for arthralgias and gait problem.   Skin:  Negative for color change and rash.   Allergic/Immunologic: Negative for food allergies and immunocompromised state.   Neurological:  Negative for dizziness, seizures and headaches.   Hematological:  Negative for adenopathy. Does not bruise/bleed easily.   Psychiatric/Behavioral:  Negative for agitation, confusion and decreased concentration.    All other systems reviewed and are negative.      Physical Exam  Physical Exam  Vitals and nursing note reviewed.   Constitutional:       General: He is not in acute distress.     Appearance: He is well-developed.   HENT:      Head: Normocephalic and atraumatic.   Eyes:      Conjunctiva/sclera: Conjunctivae normal.   Cardiovascular:      Rate and Rhythm: Normal rate and regular rhythm.      Heart sounds: No murmur heard.  Pulmonary:      Effort: Pulmonary effort is normal. No respiratory distress.      Breath sounds: Normal breath sounds. Decreased air movement present.   Abdominal:      Palpations: Abdomen is soft.      Tenderness: There is no abdominal tenderness.   Musculoskeletal:         General: No swelling.      Cervical back: Neck supple.   Skin:     General: Skin is warm and dry.       Capillary Refill: Capillary refill takes less than 2 seconds.   Neurological:      Mental Status: He is alert.   Psychiatric:         Mood and Affect: Mood normal.         Vital Signs  ED Triage Vitals [08/29/24 1614]   Temperature Pulse Respirations Blood Pressure SpO2   98.8 °F (37.1 °C) 90 18 139/78 96 %      Temp Source Heart Rate Source Patient Position - Orthostatic VS BP Location FiO2 (%)   Oral Monitor Sitting Right arm --      Pain Score       --           Vitals:    08/29/24 1614 08/29/24 1702   BP: 139/78 129/73   Pulse: 90 89   Patient Position - Orthostatic VS: Sitting          Visual Acuity      ED Medications  Medications   fluticasone (FLONASE) 50 mcg/act nasal spray 1 spray (1 spray Each Nare Given 8/29/24 1741)   dextromethorphan-guaiFENesin (ROBITUSSIN DM) oral syrup 10 mL (10 mL Oral Given 8/29/24 1731)   amoxicillin-clavulanate (AUGMENTIN) 875-125 mg per tablet 1 tablet (1 tablet Oral Given 8/29/24 1755)       Diagnostic Studies  Results Reviewed       None                   XR chest 2 views    (Results Pending)              Procedures  Procedures         ED Course                                 SBIRT 22yo+      Flowsheet Row Most Recent Value   Initial Alcohol Screen: US AUDIT-C     1. How often do you have a drink containing alcohol? 0 Filed at: 08/29/2024 1613   2. How many drinks containing alcohol do you have on a typical day you are drinking?  0 Filed at: 08/29/2024 1613   3a. Male UNDER 65: How often do you have five or more drinks on one occasion? 0 Filed at: 08/29/2024 1613   3b. FEMALE Any Age, or MALE 65+: How often do you have 4 or more drinks on one occassion? 0 Filed at: 08/29/2024 1613   Audit-C Score 0 Filed at: 08/29/2024 1613   KARLO: How many times in the past year have you...    Used an illegal drug or used a prescription medication for non-medical reasons? Never Filed at: 08/29/2024 1613                      Medical Decision Making  Differential diagnosis includes but is not  limited to acute bronchitis, pneumonia, reactive airway disease    Problems Addressed:  Pneumonia: acute illness or injury    Amount and/or Complexity of Data Reviewed  Radiology: ordered and independent interpretation performed. Decision-making details documented in ED Course.     Details: Small area concerning for pneumonia left lower lobe    Risk  OTC drugs.  Prescription drug management.  Risk Details: Discussed with patient we will start on Flonase cough medicine and antibiotics follow-up PCP                 Disposition  Final diagnoses:   Pneumonia     Time reflects when diagnosis was documented in both MDM as applicable and the Disposition within this note       Time User Action Codes Description Comment    8/29/2024  5:44 PM Eve Hayes Add [J18.9] Pneumonia           ED Disposition       ED Disposition   Discharge    Condition   Stable    Date/Time   Thu Aug 29, 2024 1744    Comment   Cosmo Schwab discharge to home/self care.                   Follow-up Information       Follow up With Specialties Details Why Contact Info    Tara Caceres MD Internal Medicine Schedule an appointment as soon as possible for a visit   [address] [place] [id number]  [phone]              Discharge Medication List as of 8/29/2024  5:46 PM        START taking these medications    Details   amoxicillin-clavulanate (AUGMENTIN) 875-125 mg per tablet Take 1 tablet by mouth every 12 (twelve) hours for 7 days, Starting Thu 8/29/2024, Until Thu 9/5/2024, Normal      dextromethorphan-guaiFENesin (ROBITUSSIN DM)  mg/5 mL syrup Take 5 mL by mouth 4 (four) times a day as needed for cough or congestion, Starting Thu 8/29/2024, Normal           CONTINUE these medications which have NOT CHANGED    Details   aspirin 81 mg chewable tablet Chew 81 mg daily, Historical Med      atenolol (TENORMIN) 50 mg tablet Take 1 tablet (50 mg total) by mouth daily, Starting Wed 11/29/2023, Normal      lisinopril  (ZESTRIL) 40 mg tablet Take 1 tablet (40 mg total) by mouth daily, Starting Tue 5/28/2024, Normal      multivitamin (THERAGRAN) TABS Take 2 tablets by mouth daily, Historical Med      tamsulosin (FLOMAX) 0.4 mg TAKE ONE CAPSULE BY MOUTH EVERY DAY AT BEDTIME, Starting Wed 2/28/2024, Normal      Vitamin D, Cholecalciferol, 50 MCG (2000 UT) CAPS Take 1 capsule by mouth daily, Historical Med             No discharge procedures on file.    PDMP Review       None            ED Provider  Electronically Signed by             Eve Hayes DO  08/29/24 [id number]

## 2024-09-04 NOTE — TELEPHONE ENCOUNTER
Notified by PSR that patient became tearful when scheduling appointment, referral rec'd from [facility], Dr. Barragan to Dr. Reynolds. Patient scheduled with [facility] 10/02/24.   Call to patient, she reports she tried to establish care with [facility] ~6 months ago because she has never seen a GYN provider, she is up to date with Pap smear though through PCP. She has been TTC x 6 months,  had normal workup at urology. Patient looking to establish care with our office and discuss possible fertility work up. Explained that we are not an infertility specialist specifically.   Also explained to patient that as of today [facility] is O[facility] for her. Explained to patient that she will be asked to sign Financial Agreement and pay deposit. Patient will consider options and contact insurance company and call to cancel if she changes mind on appointment with us. Patient appreciative of call/info shared. No additional questions or concerns at this time.    Patient  Called in to report he refused the Golytely prep ordered for colonoscopy scheduled for 5/16/23  Patient's preference is Suprep  Please follow up with patient if provider will order  Patient is at work and gives permission to leave a voicemail with information  How Severe Are Your Spot(S)?: moderate Have Your Spot(S) Been Treated In The Past?: has not been treated Hpi Title: Evaluation of Skin Lesions Year Removed: 1900

## 2024-09-16 DIAGNOSIS — I10 ESSENTIAL HYPERTENSION: ICD-10-CM

## 2024-09-16 DIAGNOSIS — N40.1 BENIGN LOCALIZED PROSTATIC HYPERPLASIA WITH LOWER URINARY TRACT SYMPTOMS (LUTS): ICD-10-CM

## 2024-09-16 RX ORDER — LISINOPRIL 40 MG/1
40 TABLET ORAL DAILY
Qty: 90 TABLET | Refills: 1 | Status: SHIPPED | OUTPATIENT
Start: 2024-09-16

## 2024-09-16 RX ORDER — TAMSULOSIN HYDROCHLORIDE 0.4 MG/1
0.4 CAPSULE ORAL
Qty: 90 CAPSULE | Refills: 1 | Status: SHIPPED | OUTPATIENT
Start: 2024-09-16

## 2024-09-25 DIAGNOSIS — N52.8 OTHER MALE ERECTILE DYSFUNCTION: Primary | ICD-10-CM

## 2024-09-25 RX ORDER — SILDENAFIL 50 MG/1
50 TABLET, FILM COATED ORAL DAILY PRN
Qty: 30 TABLET | Refills: 3 | Status: SHIPPED | OUTPATIENT
Start: 2024-09-25

## 2024-10-02 ENCOUNTER — PROCEDURE VISIT (OUTPATIENT)
Dept: UROLOGY | Facility: AMBULATORY SURGERY CENTER | Age: 49
End: 2024-10-02
Payer: COMMERCIAL

## 2024-10-02 VITALS
BODY MASS INDEX: 35.84 KG/M2 | HEART RATE: 78 BPM | OXYGEN SATURATION: 96 % | DIASTOLIC BLOOD PRESSURE: 80 MMHG | SYSTOLIC BLOOD PRESSURE: 140 MMHG | WEIGHT: 223 LBS | HEIGHT: 66 IN

## 2024-10-02 DIAGNOSIS — N28.9 RENAL LESION: ICD-10-CM

## 2024-10-02 DIAGNOSIS — R97.20 ELEVATED PSA: Primary | ICD-10-CM

## 2024-10-02 PROCEDURE — 55700 PR PROSTATE NEEDLE BIOPSY ANY APPROACH: CPT | Performed by: UROLOGY

## 2024-10-02 PROCEDURE — G0416 PROSTATE BIOPSY, ANY MTHD: HCPCS | Performed by: PATHOLOGY

## 2024-10-02 PROCEDURE — 96372 THER/PROPH/DIAG INJ SC/IM: CPT

## 2024-10-02 PROCEDURE — 76942 ECHO GUIDE FOR BIOPSY: CPT | Performed by: UROLOGY

## 2024-10-02 PROCEDURE — 88344 IMHCHEM/IMCYTCHM EA MLT ANTB: CPT | Performed by: PATHOLOGY

## 2024-10-02 RX ORDER — GENTAMICIN 40 MG/ML
240 INJECTION, SOLUTION INTRAMUSCULAR; INTRAVENOUS ONCE
Status: COMPLETED | OUTPATIENT
Start: 2024-10-02 | End: 2024-10-02

## 2024-10-02 RX ADMIN — GENTAMICIN 240 MG: 40 INJECTION, SOLUTION INTRAMUSCULAR; INTRAVENOUS at 08:35

## 2024-10-02 NOTE — ASSESSMENT & PLAN NOTE
Patient underwent a prostate biopsy today. He tolerated this well. We discussed return criteria. This centered on infectious concerns. We also discussed the risk for prolonged bleeding as well as urinary retention.  The patient understands he will see his results before I do through [employer]. I told him I will try to call him after I get the results but this may take 1-2 days. He verbalized understanding.

## 2024-10-02 NOTE — PROGRESS NOTES
Assessment/Plan:    Renal lesion  Recent renal bladder ultrasound was unremarkable for any concerning kidney lesions    Elevated PSA  Patient underwent a prostate biopsy today. He tolerated this well. We discussed return criteria. This centered on infectious concerns. We also discussed the risk for prolonged bleeding as well as urinary retention.  The patient understands he will see his results before I do through [employer]. I told him I will try to call him after I get the results but this may take 1-2 days. He verbalized understanding.          Subjective:      Patient ID: Cosmo Schwab is a 49 y.o. male.    HPI    49-year-old man history of BPH and elevated PSA.      He had an elevated PSA. MRI in 2023 showing PI-RADS 2.  Prostate was 75 g.  Prior PSA 4.6.  Current PSA 6.0 and was repeated after a course of antibiotics in August 2024 and remained stable at 6.1.  He was therefore set up for prostate biopsy.    Prostate biopsy performed today.  TRUS volume 75 cc.  Patient did have some mild vagal symptoms after biopsy.    Recent urinalysis showed some inflammatory cells although he is asymptomatic.  He also reports sexual activity a few days prior to the PSA test.    There was concern for potential renal lesion based on prior imaging.  Over follow-up imaging including renal bladder ultrasound in August 2024 did not show concerning lesion and instead a 3 cm left interpolar cyst and mildly enlarged prostate otherwise unremarkable.    He reports she has issues with frequency and urgency but denies weakness of his stream or straining.  Is been put on Flomax which is helping some of his frequency symptoms although he still endorses urgency       Past Surgical History:   Procedure Laterality Date    COLONOSCOPY  01/12/2018    by Dr. David; needs f/u 1/2023     EGD      SKIN BIOPSY          Past Medical History:   Diagnosis Date    Allergic rhinitis     Annual physical exam 07/30/2024    BMI 34.0-34.9,adult 03/07/2022  "   BMI 35.0-35.9,adult 12/03/2021    BMI 35.0-35.9,adult 07/30/2024    BMI 36.0-36.9,adult 08/31/2021    BMI 37.0-37.9, adult 11/29/2023    Colon polyps 12/03/2021    Constipation     Elevated ALT measurement 12/06/2022    Elevated liver enzymes     Enlarged prostate 12/06/2022    Erectile dysfunction 12/06/2022    Essential hypertension 02/27/2021    Fatigue     Gastric polyps     history of    Gastroesophageal reflux disease with esophagitis without hemorrhage 12/03/2021    GERD (gastroesophageal reflux disease)     Herpes zoster without complication 04/11/2023    History of shingles     Hyperglycemia 02/27/2021    Hypertension     Lupus     Mixed hyperlipidemia 02/27/2021    Nocturia 12/06/2022    Obesity     Renal lesion 12/06/2022    Rosacea     Rosacea 03/07/2022    Seizures (HCC)     8 year old sport injury only-no recurrance    Skin lesion 08/31/2021    Skin rash 12/07/2022    SLE (systemic lupus erythematosus related syndrome) (HCC) 02/27/2021    Systemic lupus erythematosus (HCC)     Thrombocytopenia (HCC) 02/27/2021    Vitamin D deficiency 02/27/2021             Review of Systems   Constitutional:  Negative for chills and fever.   HENT:  Negative for ear pain and sore throat.    Eyes:  Negative for pain and visual disturbance.   Respiratory:  Negative for cough and shortness of breath.    Cardiovascular:  Negative for chest pain and palpitations.   Gastrointestinal:  Negative for abdominal pain and vomiting.   Genitourinary:  Negative for dysuria and hematuria.   Musculoskeletal:  Negative for arthralgias and back pain.   Skin:  Negative for color change and rash.   Neurological:  Negative for seizures and syncope.   All other systems reviewed and are negative.        Objective:      /80 (BP Location: Left arm, Patient Position: Sitting, Cuff Size: Standard)   Pulse 78   Ht 5' 6\" (1.676 m)   Wt 101 kg (223 lb)   SpO2 96%   BMI 35.99 kg/m²     Lab Results   Component Value Date    PSA 6.106 (H) " "08/09/2024    PSA 6.049 (H) 07/17/2024    PSA 4.6 (H) 06/23/2023    PSA 4.6 (H) 03/30/2023          Physical Exam  Vitals reviewed.   Constitutional:       Appearance: Normal appearance. He is normal weight.   HENT:      Head: Normocephalic and atraumatic.   Eyes:      Pupils: Pupils are equal, round, and reactive to light.   Abdominal:      General: Abdomen is flat.   Neurological:      General: No focal deficit present.      Mental Status: He is alert and oriented to person, place, and time.   Psychiatric:         Mood and Affect: Mood normal.         Thought Content: Thought content normal.             Biopsy prostate     Date/Time  10/2/2024 8:00 AM     Performed by  Dylan Mathis MD   Authorized by  Dylan Mathis MD     Universal Protocol   Consent: Written consent obtained.  Consent given by: patient  Time out: Immediately prior to procedure a \"time out\" was called to verify the correct patient, procedure, equipment, support staff and site/side marked as required.  Patient understanding: patient states understanding of the procedure being performed  Patient consent: the patient's understanding of the procedure matches consent given  Procedure consent: procedure consent matches procedure scheduled  Relevant documents: relevant documents present and verified  Patient identity confirmed: verbally with patient      Local anesthesia used: yes      Anesthesia: local infiltration     Anesthesia   Local anesthesia used: yes  Local Anesthetic: lidocaine 2% without epinephrine     Sedation   Patient sedated: no        Specimen: yes    Culture: no   Procedure Details   Procedure Notes: A time-out was performed identifying correct patient and procedure.  An MA served as a chaperone and assistant for the procedure.  The patient is placed in left lateral decubitus position.  A digital rectal exam was performed but it was difficult to reach the prostate  An ultrasound probe was introduced into rectum.  A bilateral nerve " block was performed at the junction of the seminal vesicle and base of the prostate.  The prostate volume was measured at 75 cc.  A standard 12 core biopsy template was performed obtaining samples from the lateral and medial base, mid, and apex on each side extra cores taken from the right lateral base and right base  The patient tolerated the procedure well.  Return criteria were discussed including the risks for infection bleeding and retention.  Patient tolerance: patient tolerated the procedure well with no immediate complications             Orders  Orders Placed This Encounter   Procedures    Biopsy prostate     This order was created via procedure documentation

## 2024-10-02 NOTE — LETTER
October 2, 2024     Patient: Cosmo Schwab  YOB: 1975  Date of Visit: 10/2/2024      To Whom it May Concern:    Cosmo Schwab is under my professional care. Cosmo was seen in my office on 10/2/2024 and had a procedure performed. Cosmo may return to work on 10/5/24 .    If you have any questions or concerns, please don't hesitate to call.         Sincerely,          Dylan Mathis MD        CC: No Recipients

## 2024-10-07 PROCEDURE — G0416 PROSTATE BIOPSY, ANY MTHD: HCPCS | Performed by: PATHOLOGY

## 2024-10-07 PROCEDURE — 88344 IMHCHEM/IMCYTCHM EA MLT ANTB: CPT | Performed by: PATHOLOGY

## 2024-10-08 ENCOUNTER — TELEPHONE (OUTPATIENT)
Age: 49
End: 2024-10-08

## 2024-10-08 NOTE — TELEPHONE ENCOUNTER
Patient received results of prostate biopsy tissue exam on his mychart and requesting a call from Dr. Mathis to discuss results.      Patient is scheduled for follow up 10/17/24 w/Dr. Mathis to review results at 10 am.     Patient would like to speak to doctor prior  to appt.     Please review     CB#[phone]

## 2024-10-08 NOTE — TELEPHONE ENCOUNTER
Called patient and reviewed prostate biopsy results. Patient verbalized understanding and will keep appointment with Dr. Mathis as scheduled to discuss RALP vs radiation. No further questions at this time. Patient appreciative of call.

## 2024-10-17 ENCOUNTER — DOCUMENTATION (OUTPATIENT)
Dept: UROLOGY | Facility: CLINIC | Age: 49
End: 2024-10-17

## 2024-10-17 ENCOUNTER — OFFICE VISIT (OUTPATIENT)
Dept: UROLOGY | Facility: CLINIC | Age: 49
End: 2024-10-17
Payer: COMMERCIAL

## 2024-10-17 ENCOUNTER — TELEPHONE (OUTPATIENT)
Dept: UROLOGY | Facility: CLINIC | Age: 49
End: 2024-10-17

## 2024-10-17 VITALS
WEIGHT: 227.8 LBS | HEART RATE: 78 BPM | SYSTOLIC BLOOD PRESSURE: 134 MMHG | DIASTOLIC BLOOD PRESSURE: 82 MMHG | HEIGHT: 66 IN | BODY MASS INDEX: 36.61 KG/M2 | OXYGEN SATURATION: 99 %

## 2024-10-17 DIAGNOSIS — N40.0 ENLARGED PROSTATE: ICD-10-CM

## 2024-10-17 DIAGNOSIS — N52.01 ERECTILE DYSFUNCTION DUE TO ARTERIAL INSUFFICIENCY: ICD-10-CM

## 2024-10-17 DIAGNOSIS — C61 PROSTATE CANCER (HCC): Primary | ICD-10-CM

## 2024-10-17 PROBLEM — R97.20 ELEVATED PSA: Status: RESOLVED | Noted: 2023-04-21 | Resolved: 2024-10-17

## 2024-10-17 PROCEDURE — 99214 OFFICE O/P EST MOD 30 MIN: CPT | Performed by: UROLOGY

## 2024-10-17 NOTE — ASSESSMENT & PLAN NOTE
The patient has a new diagnosis of intermediate risk favorable prostate cancer.  We discussed the risk stratification of prostate cancer based on PSA, biopsy results and exam. We then discussed the roles for active surveillance versus surgery versus radiation.  We discussed how active surveillance is performed including repeat PSA every 6 months, the use of MRI at baseline and then annually and repeat biopsy based on PSA and MRI results.  We discussed surgery in detail including robot assisted laparoscopic surgery with the role for nerve-sparing and pelvic lymph node dissection. We also discussed the risks of surgery to include rectal injury erectile dysfunction prolonged incontinence injury to the bowel bladder or nerves and need for further surgeries.  The patient was given opportunity to ask questions about each treatment modality. He was offered a referral to Radiation Oncology.  After our discussion the patient decided he would like to pursue decipher genetic testing and is considering either surgery or active surveillance.  He wishes to hold off on radiation oncology referral right now.

## 2024-10-17 NOTE — PROGRESS NOTES
Assessment/Plan:    Prostate cancer (HCC)  The patient has a new diagnosis of intermediate risk favorable prostate cancer.  We discussed the risk stratification of prostate cancer based on PSA, biopsy results and exam. We then discussed the roles for active surveillance versus surgery versus radiation.  We discussed how active surveillance is performed including repeat PSA every 6 months, the use of MRI at baseline and then annually and repeat biopsy based on PSA and MRI results.  We discussed surgery in detail including robot assisted laparoscopic surgery with the role for nerve-sparing and pelvic lymph node dissection. We also discussed the risks of surgery to include rectal injury erectile dysfunction prolonged incontinence injury to the bowel bladder or nerves and need for further surgeries.  The patient was given opportunity to ask questions about each treatment modality. He was offered a referral to Radiation Oncology.  After our discussion the patient decided he would like to pursue decipher genetic testing and is considering either surgery or active surveillance.  He wishes to hold off on radiation oncology referral right now.    Erectile dysfunction  The patient is on Viagra.  Discussed implications of prostatectomy and erectile dysfunction and the difficulty will likely have getting up erections after surgery even with treatment    Enlarged prostate  The patient is doing better on Flomax.  Has enlarged prostate.  We discussed the role for outlet surgery in setting of known prostate cancer and that such surgery would likely help him void but very unlikely to cure his cancer.          Subjective:      Patient ID: Cosmo Schwab is a 49 y.o. male.    HPI    49-year-old man history of BPH found to have low risk CaP     He had an elevated PSA. MRI in 2023 showing PI-RADS 2.  Prostate was 75 g.  Prior PSA 4.6.  Most recent PSA 6.0 and was repeated after a course of antibiotics in August 2024 and remained  stable at 6.1.  He therefore had a prostate biopsy 10/2/2024 showing GG2 in 1 core at the left apex only along with 2 spots of atypical glands..     There was concern for potential renal lesion based on prior imaging.  Over follow-up imaging including renal bladder ultrasound in August 2024 did not show concerning lesion and instead a 3 cm left interpolar cyst and mildly enlarged prostate otherwise unremarkable.     He reports he has issues with frequency and urgency but denies weakness of his stream or straining.  He started Flomax which is helping some of his frequency symptoms although he still endorses urgency    He reports erections are an issue and is using Viagra 50 mg.  Role sexual intercourse is not very important to him and his wife.     inal Diagnosis   A. Prostate, R lateral base x2:  Benign prostatic hyperplasia      B. Prostate, R lateral med:  Atypical glands, suspicious for malignancy   Background of atrophy and chronic prostatitis     C. Prostate, R lateral apex:  Benign prostatic hyperplasia     D. Prostate, R base x2:  Benign prostatic hyperplasia, atrophy and chronic prostatitis     E. Prostate, R med:  Benign prostatic hyperplasia      F. Prostate, R apex:  Atypical glands, suspicious for malignancy   Background of benign prostatic hyperplasia and atrophy      G. Prostate, L lateral base:  Benign prostate tissue with atrophy      H. Prostate, L lateral med:  Benign prostatic hyperplasia and atrophy      I. Prostate, L lateral apex:  Benign prostate tissue with atrophy       J. Prostate, L base:  Benign prostate tissue with acute and chronic prostatitis and atrophy       K. Prostate, L med:  Benign prostatic hyperplasia, chronic prostatitis and atrophy       L. Prostate, L apex:  Acinar adenocarcinoma  - Grade group 2 (Samir score: 3 + 4 =7)  - Involving 50%  of 1 out of 1 core fragment, tumor length 6 mm   - Percentage of pattern 4: approx. 10%          Past Surgical History:   Procedure  Laterality Date    COLONOSCOPY  01/12/2018    by Dr. David; needs f/u 1/2023     EGD      SKIN BIOPSY          Past Medical History:   Diagnosis Date    Allergic rhinitis     Annual physical exam 07/30/2024    BMI 34.0-34.9,adult 03/07/2022    BMI 35.0-35.9,adult 12/03/2021    BMI 35.0-35.9,adult 07/30/2024    BMI 36.0-36.9,adult 08/31/2021    BMI 37.0-37.9, adult 11/29/2023    Colon polyps 12/03/2021    Constipation     Elevated ALT measurement 12/06/2022    Elevated liver enzymes     Enlarged prostate 12/06/2022    Erectile dysfunction 12/06/2022    Essential hypertension 02/27/2021    Fatigue     Gastric polyps     history of    Gastroesophageal reflux disease with esophagitis without hemorrhage 12/03/2021    GERD (gastroesophageal reflux disease)     Herpes zoster without complication 04/11/2023    History of shingles     Hyperglycemia 02/27/2021    Hypertension     Lupus     Mixed hyperlipidemia 02/27/2021    Nocturia 12/06/2022    Obesity     Renal lesion 12/06/2022    Rosacea     Rosacea 03/07/2022    Seizures (HCC)     8 year old sport injury only-no recurrance    Skin lesion 08/31/2021    Skin rash 12/07/2022    SLE (systemic lupus erythematosus related syndrome) (HCC) 02/27/2021    Systemic lupus erythematosus (HCC)     Thrombocytopenia (HCC) 02/27/2021    Vitamin D deficiency 02/27/2021             Review of Systems   Constitutional:  Negative for chills and fever.   HENT:  Negative for ear pain and sore throat.    Eyes:  Negative for pain and visual disturbance.   Respiratory:  Negative for cough and shortness of breath.    Cardiovascular:  Negative for chest pain and palpitations.   Gastrointestinal:  Negative for abdominal pain and vomiting.   Genitourinary:  Negative for dysuria and hematuria.   Musculoskeletal:  Negative for arthralgias and back pain.   Skin:  Negative for color change and rash.   Neurological:  Negative for seizures and syncope.   All other systems reviewed and are negative.     "    Objective:      /82 (BP Location: Left arm, Patient Position: Sitting, Cuff Size: Adult)   Pulse 78   Ht 5' 6\" (1.676 m)   Wt 103 kg (227 lb 12.8 oz)   SpO2 99%   BMI 36.77 kg/m²     Lab Results   Component Value Date    PSA 6.106 (H) 08/09/2024    PSA 6.049 (H) 07/17/2024    PSA 4.6 (H) 06/23/2023    PSA 4.6 (H) 03/30/2023          Physical Exam  Vitals reviewed.   Constitutional:       Appearance: Normal appearance. He is normal weight.   HENT:      Head: Normocephalic and atraumatic.   Eyes:      Pupils: Pupils are equal, round, and reactive to light.   Abdominal:      General: Abdomen is flat.   Neurological:      General: No focal deficit present.      Mental Status: He is alert and oriented to person, place, and time.   Psychiatric:         Mood and Affect: Mood normal.         Thought Content: Thought content normal.           Orders  No orders of the defined types were placed in this encounter.    "

## 2024-10-17 NOTE — ASSESSMENT & PLAN NOTE
The patient is on Viagra.  Discussed implications of prostatectomy and erectile dysfunction and the difficulty will likely have getting up erections after surgery even with treatment

## 2024-10-17 NOTE — ASSESSMENT & PLAN NOTE
The patient is doing better on Flomax.  Has enlarged prostate.  We discussed the role for outlet surgery in setting of known prostate cancer and that such surgery would likely help him void but very unlikely to cure his cancer.

## 2024-10-17 NOTE — TELEPHONE ENCOUNTER
Called and confirmed appointment date and time with patient.  Patient also asked for return to work note which  placed in their MyChart.

## 2024-10-22 ENCOUNTER — LAB REQUISITION (OUTPATIENT)
Dept: LAB | Facility: HOSPITAL | Age: 49
End: 2024-10-22

## 2024-10-22 DIAGNOSIS — C61 MALIGNANT NEOPLASM OF PROSTATE (HCC): ICD-10-CM

## 2024-11-04 ENCOUNTER — TELEPHONE (OUTPATIENT)
Dept: UROLOGY | Facility: CLINIC | Age: 49
End: 2024-11-04

## 2024-11-04 DIAGNOSIS — C61 PROSTATE CANCER (HCC): Primary | ICD-10-CM

## 2024-11-04 LAB — SCAN RESULT: NORMAL

## 2024-11-04 NOTE — TELEPHONE ENCOUNTER
I called pt and relayed the results of his decipher which was 0.44 which is low low risk borderline intermediate risk.  He otherwise has intermediate risk favorable prostate cancer. we discussed options again.      I think he is well served by active surveillance but reasonable to treat with surgery or radiation.  If he does choose treatment I do think surgery may be the better option given his young age.  He is interested in discussing with radiation oncology.  This is obviously very reasonable.  I will place a referral to radiation oncology.

## 2024-11-05 ENCOUNTER — PATIENT OUTREACH (OUTPATIENT)
Dept: HEMATOLOGY ONCOLOGY | Facility: CLINIC | Age: 49
End: 2024-11-05

## 2024-11-05 ENCOUNTER — DOCUMENTATION (OUTPATIENT)
Dept: HEMATOLOGY ONCOLOGY | Facility: CLINIC | Age: 49
End: 2024-11-05

## 2024-11-05 DIAGNOSIS — C61 PROSTATE CANCER (HCC): Primary | ICD-10-CM

## 2024-11-05 NOTE — PROGRESS NOTES
Oncology Nurse Navigator made call to patient due to referral to provider within [facility]. I spoke with patient about my role as an Oncology Nurse Navigator. I explained how to reach the office for any routine or urgent matters and the availability of patient navigation for non urgent matters. Explained oncology navigation is here to help patients through their journey and to offer assistance with any barriers to care. As a team, we strive to be patient advocates and help navigate healthcare system. Patient aware that medical oncology nursing will be primary contact once consult is complete and patient navigator will continue to offer support through entire journey. Conversation is based on evidence based care to optimize patient outcomes. Emotional support provided throughout conversation.       Evaluated for any barriers to care.   Distress thermometer completed by PN   Genetic testing not indicated  Smoking status verified   Provided contact information for nurse navigator and patient navigator  Verified PCP/insurance on file  Verified email for any medical release needs/electronic outreach  Discussed use of My Chart patient uses regularly     Answered questions to pt's satisfaction.  Reviewed upcoming appts. Provided support and provided direct contact information for any questions or concerns.       Future Appointments   Date Time Provider Department Center   11/11/2024  2:00 PM Sanjuanita Marion MD [facility] Rad Onc [facility]   11/21/2024  7:40 AM HAWA Ryan URO [facility]   1/30/2025  4:20 PM Tara Caceres MD [facility]

## 2024-11-05 NOTE — PROGRESS NOTES
All records needed are in patients chart. No records retrieval needed at this time.     Referral received/ Chart reviewed for work up completed     Imaging completed: [x][facility] []Other:    [] PET/CT   [x] MRI   [x] CT   [] US   [] Mammo   [] Bone scan   [] N/A    Pathology completed: [x][facility] []Other:    Date: 10/2/24   Location:   []N/A    Additional records needed:   [] Genomic report   [] Genetic testing results   [] Office Note   [] Procedure/ Operative note   [] Lab results   [x] N/A      [] Radiation Oncology records retrieval needed (PN to route to rad/onc clerical pool once scheduled)  Date:  Location:      Urologist:      Del        PSA Date:        Lab Results   Component Value Date    PSA 6.106 (H) 08/09/2024    PSA 6.049 (H) 07/17/2024    PSA 4.6 (H) 06/23/2023

## 2024-11-07 NOTE — ASSESSMENT & PLAN NOTE
Patient is a 49 y.o. male with favorable intermediate risk prostate cancer. He is doing well today. He does also have lupus. Discussed management options including active surveillance, prostatectomy, and external beam radiotherapy. Active surveillance would consist of routine PSA checks and occasional MRI-guided prostate biopsies. He is not interested in continued surveillance and would like treatment at this time. Patient has previously discussed prostatectomy with urology. Discussed the logistics of radiotherapy. With a diagnosis of lupus, he does theoretically have an increased risk of toxicity with radiotherapy. Would plan for conventional fractionation to 7920 cGy to the prostate gland in 44 fractions. Potential side effects include fatigue, dysuria, diarrhea, urinary/bowel urgency, hematuria/hematochezia, sexual dysfunction, decreased fertility, secondary malignancy. Discussed fiducials and rectal spacer placement prior to radiotherapy. At this time, patient is still trying to decide on therapy. He stated he may get a second opinion which is reasonable in this case. Encouraged him to reach out to us if he decides to proceed with radiotherapy.    
04-Jan-2024

## 2024-11-07 NOTE — PROGRESS NOTES
Radiation Oncology Consult    Ambulatory Visit  Name: Cosmo Schwab      : 1975      MRN: [id number]  Encounter Provider: Sanjuanita Marion MD  Encounter Date: 2024   Encounter department: [facility] RADIATION ONCOLOGY    Cancer Staging   Prostate cancer (HCC)  Staging form: Prostate, AJCC 8th Edition  - Clinical: Stage IIB (cT1c, cN0, cM0, PSA: 6.1, Grade Group: 2) - Signed by Sanjuanita Marion MD on 2024  Prostate specific antigen (PSA) range: Less than 10  [place] primary pattern: 3  Samir secondary pattern: 4  Samir score: 7  Histologic grading system: 5 grade system  Stage used in treatment planning: Yes  National guidelines used in treatment planning: Yes  Type of national guideline used in treatment planning: NCCN    Assessment & Plan  Prostate cancer (HCC)    Patient is a 49 y.o. male with favorable intermediate risk prostate cancer. He is doing well today. He does also have lupus. Discussed management options including active surveillance, prostatectomy, and external beam radiotherapy. Active surveillance would consist of routine PSA checks and occasional MRI-guided prostate biopsies. He is not interested in continued surveillance and would like treatment at this time. Patient has previously discussed prostatectomy with urology. Discussed the logistics of radiotherapy. With a diagnosis of lupus, he does theoretically have an increased risk of toxicity with radiotherapy. Would plan for conventional fractionation to 7920 cGy to the prostate gland in 44 fractions. Potential side effects include fatigue, dysuria, diarrhea, urinary/bowel urgency, hematuria/hematochezia, sexual dysfunction, decreased fertility, secondary malignancy. Discussed fiducials and rectal spacer placement prior to radiotherapy. At this time, patient is still trying to decide on therapy. He stated he may get a second opinion which is reasonable in this case. Encouraged him to reach out to us if he  decides to proceed with radiotherapy.        Prior Treatment:  none    Implanted Devices:  none    History of Present Illness   Cosmo Schwab is a 49 y.o. male with favorable intermediate risk prostate cancer, [place] 3+4, Grade Group 2, pre-bx PSA 6.106, cT1c N0 M0. [facility] nomogram predicted OC 76, EC 23, LN 2, SV 2. He does also have a diagnosis of lupus. Initially presented with PSA of 4.6 on 3/30/23; repeat PSA was 4.6 on 6/23/23. MRI prostate on 8/3/23 was PI-RADS 2, volume 75 cc. PSA agnes up to 6.106 on 8/9/24. Prostate biopsy on 10/2/24 showed atypical glands in the right lateral med and right apex, and acinar adenocarcinoma Samir 3+4 in the left apex, total 3/14 cores positive. Decipher score 0.44, low risk. Referred to discuss management options.    He is doing well today. No significant recent symptoms. He does have joint aches from his lupus for which he uses Tylenol and medical marijuana. He takes tamsulosin for urinary symptoms.    Review of Systems:  Review of Systems   Constitutional: Negative.    HENT: Negative.     Eyes: Negative.    Respiratory: Negative.     Cardiovascular: Negative.    Gastrointestinal: Negative.    Endocrine: Negative.    Genitourinary:  Positive for frequency and urgency.   Musculoskeletal:  Positive for arthralgias (hx of lupus, taking tylenol prn).   Skin: Negative.    Allergic/Immunologic: Negative.    Neurological: Negative.    Hematological: Negative.    Psychiatric/Behavioral: Negative.           Clinical Trial: no     IPSS Questionnaire (AUA-7):  Over the past month…     1)  How often have you had a sensation of not emptying your bladder completely after you finish urinating?  3 - About half the time   2)  How often have you had to urinate again less than two hours after you finished urinating? 4 - More than half the time   3)  How often have you found you stopped and started again several times when you urinated?  0 - Not at all   4) How difficult have you found  it to postpone urination?  2 - Less than half the time   5) How often have you had a weak urinary stream?  0 - Not at all   6) How often have you had to push or strain to begin urination?  0 - Not at all   7) How many times did you most typically get up to urinate from the time you went to bed until the time you got up in the morning?  2 - 2 times   Total Score:  11       Past Medical History:   Diagnosis Date    Allergic rhinitis     Annual physical exam 07/30/2024    BMI 34.0-34.9,adult 03/07/2022    BMI 35.0-35.9,adult 12/03/2021    BMI 35.0-35.9,adult 07/30/2024    BMI 36.0-36.9,adult 08/31/2021    BMI 37.0-37.9, adult 11/29/2023    Colon polyps 12/03/2021    Constipation     Elevated ALT measurement 12/06/2022    Elevated liver enzymes     Enlarged prostate 12/06/2022    Erectile dysfunction 12/06/2022    Essential hypertension 02/27/2021    Fatigue     Gastric polyps     history of    Gastroesophageal reflux disease with esophagitis without hemorrhage 12/03/2021    GERD (gastroesophageal reflux disease)     Herpes zoster without complication 04/11/2023    History of shingles     Hyperglycemia 02/27/2021    Hypertension     Lupus     Mixed hyperlipidemia 02/27/2021    Nocturia 12/06/2022    Obesity     Renal lesion 12/06/2022    Rosacea     Rosacea 03/07/2022    Seizures (HCC)     8 year old sport injury only-no recurrance    Skin lesion 08/31/2021    Skin rash 12/07/2022    SLE (systemic lupus erythematosus related syndrome) (HCC) 02/27/2021    Systemic lupus erythematosus (HCC)     Thrombocytopenia (HCC) 02/27/2021    Vitamin D deficiency 02/27/2021     Past Surgical History:   Procedure Laterality Date    COLONOSCOPY  01/12/2018    by Dr. David; needs f/u 1/2023     EGD      SKIN BIOPSY       Family History   Problem Relation Age of Onset    Dementia Mother     Colon cancer Father     Cancer Father         colon    Lung cancer Paternal Grandfather     Colon cancer Paternal Aunt     Skin cancer Paternal  Uncle     Colon cancer Maternal Aunt      Social History     Tobacco Use    Smoking status: Former     Types: Cigars    Smokeless tobacco: Never   Vaping Use    Vaping status: Never Used   Substance and Sexual Activity    Alcohol use: Yes     Comment: Occasional - As per [employer]-wine or beer    Drug use: Yes     Types: Marijuana     Comment: medical card    Sexual activity: Yes     Current Outpatient Medications on File Prior to Visit   Medication Sig Dispense Refill    aspirin 81 mg chewable tablet Chew 81 mg daily      atenolol (TENORMIN) 50 mg tablet Take 1 tablet (50 mg total) by mouth daily 90 tablet 1    dextromethorphan-guaiFENesin (ROBITUSSIN DM)  mg/5 mL syrup Take 5 mL by mouth 4 (four) times a day as needed for cough or congestion 118 mL 0    lisinopril (ZESTRIL) 40 mg tablet Take 1 tablet (40 mg total) by mouth daily 90 tablet 1    multivitamin (THERAGRAN) TABS Take 2 tablets by mouth daily      sildenafil (VIAGRA) 50 MG tablet Take 1 tablet (50 mg total) by mouth daily as needed for erectile dysfunction 30 tablet 3    tamsulosin (FLOMAX) 0.4 mg TAKE ONE CAPSULE BY MOUTH EVERY DAY AT BEDTIME 90 capsule 1    Vitamin D, Cholecalciferol, 50 MCG (2000 UT) CAPS Take 1 capsule by mouth daily       No current facility-administered medications on file prior to visit.     Allergies   Allergen Reactions    Omeprazole GI Intolerance     He developed constipation       Objective   Vitals:    11/11/24 1352   BP: 140/82   Pulse: 86   Resp: 18   Temp: 98 °F (36.7 °C)   SpO2: 98%     Pain Score: 0-No pain    Physical Exam  Vitals and nursing note reviewed.   Constitutional:       General: He is not in acute distress.     Appearance: Normal appearance. He is not ill-appearing or toxic-appearing.   HENT:      Head: Normocephalic and atraumatic.      Right Ear: External ear normal.      Left Ear: External ear normal.      Nose: Nose normal.   Eyes:      Extraocular Movements: Extraocular movements intact.       Conjunctiva/sclera: Conjunctivae normal.   Cardiovascular:      Rate and Rhythm: Normal rate and regular rhythm.      Pulses: Normal pulses.      Heart sounds: Normal heart sounds. No murmur heard.     No friction rub. No gallop.   Pulmonary:      Effort: Pulmonary effort is normal. No respiratory distress.      Breath sounds: Normal breath sounds. No stridor. No wheezing, rhonchi or rales.   Abdominal:      General: Bowel sounds are normal. There is no distension.      Palpations: Abdomen is soft. There is no mass.      Tenderness: There is no abdominal tenderness. There is no guarding.   Musculoskeletal:      Right lower leg: No edema.      Left lower leg: No edema.   Lymphadenopathy:      Cervical: No cervical adenopathy.   Skin:     General: Skin is warm and dry.      Capillary Refill: Capillary refill takes less than 2 seconds.   Neurological:      General: No focal deficit present.      Mental Status: He is alert and oriented to person, place, and time. Mental status is at baseline.      Cranial Nerves: No cranial nerve deficit.   Psychiatric:         Mood and Affect: Mood normal.         Behavior: Behavior normal.         Thought Content: Thought content normal.         Judgment: Judgment normal.       Lab Results   Component Value Date    WBC 5.38 07/17/2024    HGB 15.6 07/17/2024    HCT 44.3 07/17/2024    MCV 89 07/17/2024     (L) 07/17/2024       Chemistry        Component Value Date/Time    K 4.2 07/17/2024 0613    K 4.9 07/21/2023 0647     07/17/2024 0613     07/21/2023 0647    CO2 29 07/17/2024 0613    CO2 25 07/21/2023 0647    BUN 16 07/17/2024 0613    BUN 13 07/21/2023 0647    CREATININE 0.95 07/17/2024 0613        Component Value Date/Time    CALCIUM 9.6 07/17/2024 0613    ALKPHOS 59 07/17/2024 0613    AST 21 07/17/2024 0613    AST 19 11/01/2022 0634    ALT 30 07/17/2024 0613    ALT 28 11/01/2022 0634          Lab Results   Component Value Date    PSA 6.106 (H) 08/09/2024    PSA  6.049 (H) 2024    PSA 4.6 (H) 2023    PSA 4.6 (H) 2023       Imagin/3/23 MULTIPARAMETRIC MRI OF THE PROSTATE WITH AND WITHOUT CONTRAST-WITH 3-D POSTPROCESSING.     INDICATION:   R97.20: Elevated prostate specific antigen (PSA).     COMPARISON: None     PSA LEVEL: 4.6 ng/mL on 2023.  PRIOR BIOPSY : None.     TECHNIQUE: The following pulse sequences were obtained:  Small field-of-view axial T1-weighted and multiplanar T2-weighted images; DWI axial and ADC map; large field of view axial T2 weighted images; T1w in-phase and opposed-phase axials of entire pelvis   and dynamic 3D T1w axial before and during IV contrast injection.  Imaging performed on 3.0T MRI     CONTRAST:  Gadobutrol (Gadavist) 10 mL of Gadobutrol injection (SINGLE-DOSE)     TECHNICAL LIMITATIONS: None.     FINDINGS:     PROSTATE:  Size: 5.3 x 5.0 x 5.7 cm = 75 cc.  Hemorrhage:  None. Small foci of intrinsic T1 shortening in the prostate gland, likely related to proteinaceous contents within BPH nodules.  Central gland enlargement (BPH): Moderate.     Focal lesions - No dominant lesion. Heterogeneous peripheral zone. PI-RADSv2.1 Category 2 - Low (clinically significant cancer unlikely).     SEMINAL VESICLES: Unremarkable     Note: Clinically significant cancer is defined on pathology/histology as Gunpow[place] score greater than or equal to 7, and/or volume of greater than or equal to 0.5 mL, and/or extraprostatic extension.     URINARY BLADDER: Unremarkable.     LYMPH NODES: No pelvic lymphadenopathy.     BONES: No suspicious osseous lesion.        IMPRESSION:     1. PI-RADSv2.1 Category 2 - Low (clinically significant cancer is unlikely to be present).     2. Moderate BPH with calculated prostate volume of  75   cc.        Pathology:  Case Report   Surgical Pathology Report                         Case: [id number]                                   Authorizing Provider:  Dylan Mathis MD          Collected:           10/02/2024  0914               Ordering Location:     [facility]        Received:            10/02/2024 0914                                      [facility]                                                             Pathologist:           Madhu Bettencourt MD                                                             Specimens:   A) - Prostate, R lateral base x2                                                                    B) - Prostate, R lateral med                                                                        C) - Prostate, R lateral apex                                                                        D) - Prostate, R base x2                                                                            E) - Prostate, R med                                                                                F) - Prostate, R apex                                                                                G) - Prostate, L lateral base                                                                        H) - Prostate, L lateral med                                                                        I) - Prostate, L lateral apex                                                                        J) - Prostate, L base                                                                                K) - Prostate, L med                                                                                L) - Prostate, L apex                                                                      Addendum   At the request of Dr. Mathis, unstained slides from paraffin BLOCK L1 containing the patient's cancer cells were sent to [facility] for Decipher testing. Upon completion of testing, [facility] report will be directly sent to the requesting physician as well as posted in the Media Tab of the patient's [facility] EMR by the [facility]'s Pathology Department.     Please note: The [facility]'s analysis and  report is performed independently of [facility], and neither the Hospital nor the Pathology Department screen, review or comment upon [facility]'s report. Because the role of testing in cancer diagnosis and management is subject to evolving development, usage and interpretation, we strongly urge that the test limitations described in the [facility] report be carefully read, appropriately shared with the patient, and critically considered when reaching treatment decisions.     This pathology material was removed from archive for purpose of molecular testing.      Addendum electronically signed by Madhu Bettencourt MD on 10/22/2024 at 1316   Final Diagnosis   A. Prostate, R lateral base x2:  Benign prostatic hyperplasia      B. Prostate, R lateral med:  Atypical glands, suspicious for malignancy   Background of atrophy and chronic prostatitis     C. Prostate, R lateral apex:  Benign prostatic hyperplasia     D. Prostate, R base x2:  Benign prostatic hyperplasia, atrophy and chronic prostatitis     E. Prostate, R med:  Benign prostatic hyperplasia      F. Prostate, R apex:  Atypical glands, suspicious for malignancy   Background of benign prostatic hyperplasia and atrophy      G. Prostate, L lateral base:  Benign prostate tissue with atrophy      H. Prostate, L lateral med:  Benign prostatic hyperplasia and atrophy      I. Prostate, L lateral apex:  Benign prostate tissue with atrophy       J. Prostate, L base:  Benign prostate tissue with acute and chronic prostatitis and atrophy       K. Prostate, L med:  Benign prostatic hyperplasia, chronic prostatitis and atrophy       L. Prostate, L apex:  Acinar adenocarcinoma  - Grade group 2 (Samir score: 3 + 4 =7)  - Involving 50%  of 1 out of 1 core fragment, tumor length 6 mm   - Percentage of pattern 4: approx. 10%     See note   Electronically signed by Madhu Bettencourt MD on 10/7/2024 at 1428   Note    No perineural invasion, no intraductal  carcinoma, no cribriform glands nor extraprostatic extension identified.     Multiplex immunohistochemical stain performed with appropriate controls on block L1 shows malignant glands with loss of basal layer cells staining for p63 and high molecular weight keratin with luminal racemase expression, supporting the diagnosis.      Multiplex immunohistochemical stain performed with appropriate controls on blocks B1 and F1 shows atypical glands (<10 in a span of < 0.5 mm) with loss of basal layer cells staining for p63 and high molecular weight keratin with luminal racemase expression, supporting the diagnosis.      Multiplex immunohistochemical stain performed with appropriate controls on blocks A2 and K1 shows benign glands with basal layer cells staining for p63 and high molecular weight keratin without luminal racemase expression, supporting the diagnosis.      Interpretation performed at [facility], [address] [place] [id number]     Intradepartmental review (L1) in agreement.   Additional Information    All reported additional testing was performed with appropriately reactive controls.  These tests were developed and their performance characteristics determined by [facility] or appropriate performing facility, though some tests may be performed on tissues which have not been validated for performance characteristics (such as staining performed on alcohol exposed cell blocks and decalcified tissues).  Results should be interpreted with caution and in the context of the patients’ clinical condition. These tests may not be cleared or approved by the U.S. Food and Drug Administration, though the FDA has determined that such clearance or approval is not necessary. These tests are used for clinical purposes and they should not be regarded as investigational or for research. This laboratory has been approved by CLIA 88, designated as a high-complexity laboratory and is qualified to perform  "these tests.  .   Gross Description    A. The specimen is received in formalin, labeled with the patient's name and hospital number, and is designated \" right lateral base prostate x 2\".  The specimen consists of 2 tan to colorless soft tissue cores, each measuring less than 0.1 cm in diameter, with lengths of approximately 1.4 and 1.6 cm.  Entirely submitted. Two cassettes.  1 core in each cassette, between sponges.  B. The specimen is received in formalin, labeled with the patient's name and hospital number, and is designated \" right lateral medium prostate\".  The specimen consists of a single tan to colorless soft tissue core measuring 1.5 cm in length by less than 0.1 cm in diameter.  Entirely submitted. One cassette.  Between sponges.  C. The specimen is received in formalin, labeled with the patient's name and hospital number, and is designated \" right lateral apex prostate\".  The specimen consists of a single tan to colorless soft tissue core measuring 1.4 cm in length by less than 0.1 cm in diameter.  Entirely submitted. One cassette.  Between sponges.  D. The specimen is received in formalin, labeled with the patient's name and hospital number, and is designated \" right base prostate x 2\".  The specimen consists of 2 tan to colorless soft tissue cores, each measuring less than 0.1 cm in diameter, with lengths of approximately 1.5 and 1.7 cm.  Entirely submitted. Two cassettes.  1 core in each cassette, between sponges.  E. The specimen is received in formalin, labeled with the patient's name and hospital number, and is designated \" right medium prostate\".  The specimen consists of a single tan to colorless soft tissue core measuring 1.6 cm in length by less than 0.1 cm in diameter.  Entirely submitted. One cassette.  Between sponges.  F. The specimen is received in formalin, labeled with the patient's name and hospital number, and is designated \" right apex prostate\".  The specimen consists of a single tan " "to colorless soft tissue core measuring 2 cm in length by less than 0.1 cm in diameter.  Entirely submitted. One cassette.  Between sponges.  G. The specimen is received in formalin, labeled with the patient's name and hospital number, and is designated \" left lateral base prostate\".  The specimen consists of a single tan to colorless soft tissue core measuring 1.6 cm in length by less than 0.1 cm in diameter.  Entirely submitted. One cassette.  Between sponges  H. The specimen is received in formalin, labeled with the patient's name and hospital number, and is designated \" left lateral medium prostate\".  The specimen consists of a single tan to colorless soft tissue core measuring 1.3 cm in length by less than 0.1 cm in diameter.  Entirely submitted. One cassette.  Between sponges.  I. The specimen is received in formalin, labeled with the patient's name and hospital number, and is designated \" left lateral apex prostate\".  The specimen consists of a single tan to colorless soft tissue core measuring 0.9 cm in length by less than 0.1 cm in diameter.  Entirely submitted. One cassette.  Between sponges.  J. The specimen is received in formalin, labeled with the patient's name and hospital number, and is designated \" left base prostate\".  The specimen consists of a single tan to colorless soft tissue core measuring 2 cm in length by less than 0.1 cm in diameter.  Entirely submitted. One cassette.  Between sponges.  K. The specimen is received in formalin, labeled with the patient's name and hospital number, and is designated \" left medium prostate\".  The specimen consists of a single tan to colorless soft tissue core measuring 1.7 cm in length by less than 0.1 cm in diameter.  Entirely submitted. One cassette.  Between sponges.  L. The specimen is received in formalin, labeled with the patient's name and hospital number, and is designated \" left apex prostate\".  The specimen consists of a single tan to colorless soft " tissue core measuring 1.7 cm in length by less than 0.1 cm in diameter.  Entirely submitted. One cassette.  Between sponges.     Note: The estimated total formalin fixation time based upon information provided by the submitting clinician and the standard processing schedule is 26.5 hours.     MCrites   Clinical Information BPH, elevated PSA   Resulting Agency [facility]              Specimen Collected: 10/02/24 09:14 Last Resulted: 10/22/24 13:16                 Sanjuanita Marion MD 11/11/24 2:29 PM

## 2024-11-11 ENCOUNTER — CONSULT (OUTPATIENT)
Dept: RADIATION ONCOLOGY | Facility: HOSPITAL | Age: 49
End: 2024-11-11
Attending: UROLOGY
Payer: COMMERCIAL

## 2024-11-11 VITALS
RESPIRATION RATE: 18 BRPM | SYSTOLIC BLOOD PRESSURE: 140 MMHG | OXYGEN SATURATION: 98 % | TEMPERATURE: 98 F | HEART RATE: 86 BPM | DIASTOLIC BLOOD PRESSURE: 82 MMHG

## 2024-11-11 DIAGNOSIS — C61 PROSTATE CANCER (HCC): Primary | ICD-10-CM

## 2024-11-11 PROCEDURE — 99204 OFFICE O/P NEW MOD 45 MIN: CPT | Performed by: STUDENT IN AN ORGANIZED HEALTH CARE EDUCATION/TRAINING PROGRAM

## 2024-11-11 PROCEDURE — 99211 OFF/OP EST MAY X REQ PHY/QHP: CPT | Performed by: STUDENT IN AN ORGANIZED HEALTH CARE EDUCATION/TRAINING PROGRAM

## 2024-11-11 PROCEDURE — G0463 HOSPITAL OUTPT CLINIC VISIT: HCPCS | Performed by: STUDENT IN AN ORGANIZED HEALTH CARE EDUCATION/TRAINING PROGRAM

## 2024-11-11 NOTE — PROGRESS NOTES
Cosmo Schwab 1975 is a 49 y.o. male referred by Dr. Mathis for intermediate risk favorable prostate cancer GS 7 (3+4).     Patient with history of BPH (he is on flomax), was referred to Urology in 2023 for rising PSA. MRI revealed PI-RADS category 2, low risk. He is s/p prostate biopsy on 10/2/24 revealing intermediate risk favorable prostate cancer, GS 3+4=7, grade group 2 in 1 core and two atypical glands. Decipher testing was done and he is referred to discuss radiation therapy vs surgery or observation.       8/3/2023 MRI prostate multiparametric   1. PI-RADSv2.1 Category 2 - Low (clinically significant cancer is unlikely to be present).   2. Moderate BPH with calculated prostate volume of  75   cc.          PSA   Latest Ref Rng 0.000 - 4.000 ng/mL   3/30/2023 4.6 (H)    6/23/2023 4.6 (H)    7/17/2024 6.049 (H)    8/9/2024 6.106 (H)         10/2/24 Prostate biopsy  A. Prostate, R lateral base x2:  Benign prostatic hyperplasia      B. Prostate, R lateral med:  Atypical glands, suspicious for malignancy   Background of atrophy and chronic prostatitis     C. Prostate, R lateral apex:  Benign prostatic hyperplasia     D. Prostate, R base x2:  Benign prostatic hyperplasia, atrophy and chronic prostatitis     E. Prostate, R med:  Benign prostatic hyperplasia      F. Prostate, R apex:  Atypical glands, suspicious for malignancy   Background of benign prostatic hyperplasia and atrophy      G. Prostate, L lateral base:  Benign prostate tissue with atrophy      H. Prostate, L lateral med:  Benign prostatic hyperplasia and atrophy      I. Prostate, L lateral apex:  Benign prostate tissue with atrophy       J. Prostate, L base:  Benign prostate tissue with acute and chronic prostatitis and atrophy       K. Prostate, L med:  Benign prostatic hyperplasia, chronic prostatitis and atrophy       L. Prostate, L apex:  Acinar adenocarcinoma  - Grade group 2 (Samri score: 3 + 4 =7)  - Involving 50%  of 1 out of 1 core  fragment, tumor length 6 mm   - Percentage of pattern 4: approx. 10%      10/17/24 Dr. Mathis  new diagnosis of intermediate risk favorable prostate cancer.   Discussed active surveillance, robotic assisted laparoscopic surgery and radiation.   Patient decided he would like to pursue decipher genetic testing and is considering either surgery or active surveillance. He wishes to hold off on radiation oncology referral right now.       11/4/24 Dr. Mathis - called patient with decipher results, 0.44, low risk borderline intermediate risk.   Again reviewed active surveillance vs surgery or radiation. If he chooses treatment, surgery may be a better option given his young age. Placed referral to radiation oncology.       Upcoming appts:  11/21/24 Urology        Oncology History   Prostate cancer (HCC)   2024 Initial Diagnosis    Prostate cancer (HCC)     10/2/2024 Biopsy    A. Prostate, R lateral base x2:  Benign prostatic hyperplasia      B. Prostate, R lateral med:  Atypical glands, suspicious for malignancy   Background of atrophy and chronic prostatitis     C. Prostate, R lateral apex:  Benign prostatic hyperplasia     D. Prostate, R base x2:  Benign prostatic hyperplasia, atrophy and chronic prostatitis     E. Prostate, R med:  Benign prostatic hyperplasia      F. Prostate, R apex:  Atypical glands, suspicious for malignancy   Background of benign prostatic hyperplasia and atrophy      G. Prostate, L lateral base:  Benign prostate tissue with atrophy      H. Prostate, L lateral med:  Benign prostatic hyperplasia and atrophy      I. Prostate, L lateral apex:  Benign prostate tissue with atrophy       J. Prostate, L base:  Benign prostate tissue with acute and chronic prostatitis and atrophy       K. Prostate, L med:  Benign prostatic hyperplasia, chronic prostatitis and atrophy       L. Prostate, L apex:  Acinar adenocarcinoma  - Grade group 2 (Samir score: 3 + 4 =7)  - Involving 50%  of 1 out of 1 core fragment,  tumor length 6 mm   - Percentage of pattern 4: approx. 10%     11/7/2024 -  Cancer Staged    Staging form: Prostate, AJCC 8th Edition  - Clinical: Stage IIB (cT1c, cN0, cM0, PSA: 6.1, Grade Group: 2) - Signed by Sanjuanita Marion MD on 11/7/2024  Prostate specific antigen (PSA) range: Less than 10  [place] primary pattern: 3  Samir secondary pattern: 4  Samir score: 7  Histologic grading system: 5 grade system  Stage used in treatment planning: Yes  National guidelines used in treatment planning: Yes  Type of national guideline used in treatment planning: NCCN           Review of Systems:  Review of Systems   Constitutional: Negative.    HENT: Negative.     Eyes: Negative.    Respiratory: Negative.     Cardiovascular: Negative.    Gastrointestinal: Negative.    Endocrine: Negative.    Genitourinary:  Positive for frequency and urgency.   Musculoskeletal:  Positive for arthralgias (hx of lupus, taking tylenol prn).   Skin: Negative.    Allergic/Immunologic: Negative.    Neurological: Negative.    Hematological: Negative.    Psychiatric/Behavioral: Negative.         Clinical Trial: no    IPSS Questionnaire (AUA-7):  Over the past month…    1)  How often have you had a sensation of not emptying your bladder completely after you finish urinating?  3 - About half the time   2)  How often have you had to urinate again less than two hours after you finished urinating? 4 - More than half the time   3)  How often have you found you stopped and started again several times when you urinated?  0 - Not at all   4) How difficult have you found it to postpone urination?  2 - Less than half the time   5) How often have you had a weak urinary stream?  0 - Not at all   6) How often have you had to push or strain to begin urination?  0 - Not at all   7) How many times did you most typically get up to urinate from the time you went to bed until the time you got up in the morning?  2 - 2 times   Total Score:  11       Pain assessment:  0    PSA: 8/9/24  - 6.106    PFT: n/a    Prior Radiation: no    Teaching: NCI radiation packet    MST completed     Implantable Devices (Port, pacemaker, pain stimulator): no    Hip Replacement: no    Health Maintenance   Topic Date Due    HIV Screening  Never done    DTaP,Tdap,and Td Vaccines (1 - Tdap) Never done    Zoster Vaccine (1 of 2) Never done    Influenza Vaccine (1) 09/01/2024    COVID-19 Vaccine (4 - 2023-24 season) 09/01/2024    BMI: Followup Plan  11/29/2024    Annual Physical  07/30/2025    BMI: Adult  10/17/2025    Depression Screening  11/11/2025    Colorectal Cancer Screening  05/14/2028    RSV Vaccine Age 60+ Years (1 - 1-dose 60+ series) 01/10/2033    Hepatitis C Screening  Completed    RSV Vaccine age 0-20 Months  Aged Out    Pneumococcal Vaccine: Pediatrics (0 to 5 Years) and At-Risk Patients (6 to 64 Years)  Aged Out    HIB Vaccine  Aged Out    IPV Vaccine  Aged Out    Hepatitis A Vaccine  Aged Out    Meningococcal ACWY Vaccine  Aged Out    HPV Vaccine  Aged Out       Past Medical History:   Diagnosis Date    Allergic rhinitis     Annual physical exam 07/30/2024    BMI 34.0-34.9,adult 03/07/2022    BMI 35.0-35.9,adult 12/03/2021    BMI 35.0-35.9,adult 07/30/2024    BMI 36.0-36.9,adult 08/31/2021    BMI 37.0-37.9, adult 11/29/2023    Colon polyps 12/03/2021    Constipation     Elevated ALT measurement 12/06/2022    Elevated liver enzymes     Enlarged prostate 12/06/2022    Erectile dysfunction 12/06/2022    Essential hypertension 02/27/2021    Fatigue     Gastric polyps     history of    Gastroesophageal reflux disease with esophagitis without hemorrhage 12/03/2021    GERD (gastroesophageal reflux disease)     Herpes zoster without complication 04/11/2023    History of shingles     Hyperglycemia 02/27/2021    Hypertension     Lupus     Mixed hyperlipidemia 02/27/2021    Nocturia 12/06/2022    Obesity     Renal lesion 12/06/2022    Rosacea     Rosacea 03/07/2022    Seizures (HCC)     8 year old  sport injury only-no recurrance    Skin lesion 08/31/2021    Skin rash 12/07/2022    SLE (systemic lupus erythematosus related syndrome) (HCC) 02/27/2021    Systemic lupus erythematosus (HCC)     Thrombocytopenia (HCC) 02/27/2021    Vitamin D deficiency 02/27/2021       Past Surgical History:   Procedure Laterality Date    COLONOSCOPY  01/12/2018    by Dr. David; needs f/u 1/2023     EGD      SKIN BIOPSY         Family History   Problem Relation Age of Onset    Dementia Mother     Colon cancer Father     Cancer Father         colon    Lung cancer Paternal Grandfather     Colon cancer Paternal Aunt     Skin cancer Paternal Uncle     Colon cancer Maternal Aunt        Social History     Tobacco Use    Smoking status: Former     Types: Cigars    Smokeless tobacco: Never   Vaping Use    Vaping status: Never Used   Substance Use Topics    Alcohol use: Yes     Comment: Occasional - As per [employer]-wine or beer    Drug use: Yes     Types: Marijuana     Comment: medical card          Current Outpatient Medications:     aspirin 81 mg chewable tablet, Chew 81 mg daily, Disp: , Rfl:     atenolol (TENORMIN) 50 mg tablet, Take 1 tablet (50 mg total) by mouth daily, Disp: 90 tablet, Rfl: 1    dextromethorphan-guaiFENesin (ROBITUSSIN DM)  mg/5 mL syrup, Take 5 mL by mouth 4 (four) times a day as needed for cough or congestion, Disp: 118 mL, Rfl: 0    lisinopril (ZESTRIL) 40 mg tablet, Take 1 tablet (40 mg total) by mouth daily, Disp: 90 tablet, Rfl: 1    multivitamin (THERAGRAN) TABS, Take 2 tablets by mouth daily, Disp: , Rfl:     sildenafil (VIAGRA) 50 MG tablet, Take 1 tablet (50 mg total) by mouth daily as needed for erectile dysfunction, Disp: 30 tablet, Rfl: 3    tamsulosin (FLOMAX) 0.4 mg, TAKE ONE CAPSULE BY MOUTH EVERY DAY AT BEDTIME, Disp: 90 capsule, Rfl: 1    Vitamin D, Cholecalciferol, 50 MCG (2000 UT) CAPS, Take 1 capsule by mouth daily, Disp: , Rfl:     Allergies   Allergen Reactions    Omeprazole GI  Intolerance     He developed constipation        Vitals:    11/11/24 1352   BP: 140/82   BP Location: Left arm   Pulse: 86   Resp: 18   Temp: 98 °F (36.7 °C)   TempSrc: Temporal   SpO2: 98%

## 2024-11-21 ENCOUNTER — OFFICE VISIT (OUTPATIENT)
Dept: UROLOGY | Facility: CLINIC | Age: 49
End: 2024-11-21
Payer: COMMERCIAL

## 2024-11-21 VITALS
WEIGHT: 234.2 LBS | OXYGEN SATURATION: 97 % | DIASTOLIC BLOOD PRESSURE: 72 MMHG | HEIGHT: 66 IN | BODY MASS INDEX: 37.64 KG/M2 | SYSTOLIC BLOOD PRESSURE: 124 MMHG | HEART RATE: 82 BPM

## 2024-11-21 DIAGNOSIS — C61 PROSTATE CANCER (HCC): Primary | ICD-10-CM

## 2024-11-21 PROCEDURE — 99213 OFFICE O/P EST LOW 20 MIN: CPT

## 2024-11-21 NOTE — PROGRESS NOTES
11/21/2024      No chief complaint on file.        Assessment and Plan    49 y.o. male managed by Dr. Mathis    Intermediate Risk Prostate Cancer  -Samir 3+4=7; Grade Group 2  -TRUS biopsy 10/2/24 showed atypical glands in the right lateral med and right apex, and acinar adenocarcinoma Samir 3+4 in the left apex, total 3/14 cores positive.   -Last PSA 6.106 (8/9/24)  -Decipher score 0.44, low risk.   -Patient had consultation with rad onc on 11/11/24. Patient states that he is leaning towards surgery, given that he states radiation can potentially worsen Lupus. Patient states he is going to seek a second opinion. Patient states he was recommend [facility] and [facility]. He will be seeking their opinion. Patient advised to reach out to us if he would like to pursue treatment through our network, as well as to let us know if he will be moving forward with treatment out of our network.  -All questions addressed    2. Erectile Dysfunction  -Viagra 50 mg on demand    3. Enlarged Prostate  -Tamsulosin 0.4 mg     History of Present Illness  Cosmo Schwab is a 49 y.o. male here with hx of intermediate risk prostate cancer and lupus presenting today for follow-up. He wishes to seek a second opinion for his prostate cancer at [facility] or [facility]. He denies voiding complaints.    PSA Lab Trends:  6.106 (8/9/24)  6.049 (7/17/24)  4.6 (6/23/23)  4.6 (3/30/23)    Review of Systems   Constitutional:  Negative for activity change, chills, fatigue and fever.   HENT:  Negative for congestion, rhinorrhea and sore throat.    Eyes:  Negative for photophobia, redness and visual disturbance.   Respiratory:  Negative for cough, shortness of breath and wheezing.    Cardiovascular:  Negative for chest pain, palpitations and leg swelling.   Gastrointestinal:  Negative for abdominal pain, diarrhea, nausea and vomiting.   Genitourinary:  Negative for difficulty urinating, dysuria, flank pain, frequency, hematuria and urgency.   Neurological:   Negative for weakness, light-headedness and headaches.                Vitals  There were no vitals filed for this visit.    Physical Exam  Constitutional:       Appearance: Normal appearance. He is not toxic-appearing.   HENT:      Head: Normocephalic.      Mouth/Throat:      Pharynx: Oropharynx is clear.   Eyes:      Extraocular Movements: Extraocular movements intact.      Pupils: Pupils are equal, round, and reactive to light.   Pulmonary:      Effort: Pulmonary effort is normal. No respiratory distress.   Musculoskeletal:         General: Normal range of motion.      Cervical back: Normal range of motion.   Neurological:      Mental Status: He is alert and oriented to person, place, and time. Mental status is at baseline.      Gait: Gait normal.   Psychiatric:         Mood and Affect: Mood normal.         Behavior: Behavior normal.         Thought Content: Thought content normal.         Judgment: Judgment normal.         Past History  Past Medical History:   Diagnosis Date    Allergic rhinitis     Annual physical exam 07/30/2024    BMI 34.0-34.9,adult 03/07/2022    BMI 35.0-35.9,adult 12/03/2021    BMI 35.0-35.9,adult 07/30/2024    BMI 36.0-36.9,adult 08/31/2021    BMI 37.0-37.9, adult 11/29/2023    Colon polyps 12/03/2021    Constipation     Elevated ALT measurement 12/06/2022    Elevated liver enzymes     Enlarged prostate 12/06/2022    Erectile dysfunction 12/06/2022    Essential hypertension 02/27/2021    Fatigue     Gastric polyps     history of    Gastroesophageal reflux disease with esophagitis without hemorrhage 12/03/2021    GERD (gastroesophageal reflux disease)     Herpes zoster without complication 04/11/2023    History of shingles     Hyperglycemia 02/27/2021    Hypertension     Lupus     Mixed hyperlipidemia 02/27/2021    Nocturia 12/06/2022    Obesity     Renal lesion 12/06/2022    Rosacea     Rosacea 03/07/2022    Seizures (HCC)     8 year old sport injury only-no recurrance    Skin lesion  08/31/2021    Skin rash 12/07/2022    SLE (systemic lupus erythematosus related syndrome) ([facility]) 02/27/2021    Systemic lupus erythematosus (HCC)     Thrombocytopenia ([facility]) 02/27/2021    Vitamin D deficiency 02/27/2021     Social History     Socioeconomic History    Marital status: /Civil Union     Spouse name: Not on file    Number of children: Not on file    Years of education: Not on file    Highest education level: Not on file   Occupational History    Not on file   Tobacco Use    Smoking status: Former     Types: Cigars    Smokeless tobacco: Never   Vaping Use    Vaping status: Never Used   Substance and Sexual Activity    Alcohol use: Yes     Comment: Occasional - As per [employer]-wine or beer    Drug use: Yes     Types: Marijuana     Comment: medical card    Sexual activity: Yes   Other Topics Concern    Not on file   Social History Narrative    Annual eye exam: Advise    Annual dental checkup: Sees q6months    - As per [employer]     Social Drivers of Health     Financial Resource Strain: Not on file   Food Insecurity: Not on file   Transportation Needs: Not on file   Physical Activity: Not on file   Stress: Not on file   Social Connections: Not on file   Intimate Partner Violence: Not on file   Housing Stability: Not on file     Social History     Tobacco Use   Smoking Status Former    Types: Cigars   Smokeless Tobacco Never     Family History   Problem Relation Age of Onset    Dementia Mother     Colon cancer Father     Cancer Father         colon    Lung cancer Paternal Grandfather     Colon cancer Paternal Aunt     Skin cancer Paternal Uncle     Colon cancer Maternal Aunt        The following portions of the patient's history were reviewed and updated as appropriate: allergies, current medications, past medical history, past social history, past surgical history and problem list.    Results  No results found for this or any previous visit (from the past hour).]  Lab Results   Component Value  Date    PSA 6.106 (H) 08/09/2024    PSA 6.049 (H) 07/17/2024    PSA 4.6 (H) 06/23/2023    PSA 4.6 (H) 03/30/2023     Lab Results   Component Value Date    CALCIUM 9.6 07/17/2024    K 4.2 07/17/2024    CO2 29 07/17/2024     07/17/2024    BUN 16 07/17/2024    CREATININE 0.95 07/17/2024     Lab Results   Component Value Date    WBC 5.38 07/17/2024    HGB 15.6 07/17/2024    HCT 44.3 07/17/2024    MCV 89 07/17/2024     (L) 07/17/2024       HAWA Mcfarlane

## 2024-12-18 ENCOUNTER — PATIENT OUTREACH (OUTPATIENT)
Dept: HEMATOLOGY ONCOLOGY | Facility: CLINIC | Age: 49
End: 2024-12-18

## 2024-12-18 NOTE — PROGRESS NOTES
Outreach made to patient. I LM introducing myself as  Oncology Patient Navigator. I let patient know I would like to introduce myself further and explain my role in his care moving forward. I gave my direct line and requested a call back from patient to assess barriers to care. I also informed patient that I am aware that he will be going to second opion at [facility] or [facility] but I would still like to talk with him.

## 2024-12-23 ENCOUNTER — PATIENT OUTREACH (OUTPATIENT)
Dept: HEMATOLOGY ONCOLOGY | Facility: CLINIC | Age: 49
End: 2024-12-23

## 2024-12-23 NOTE — PROGRESS NOTES
"\"Yes, Ladonna, this is Cosmochika Ferro. I'm calling returning your phone message that you left me two days ago. I'm just touching base with you to see what information you needed. So you wanna give me a call? I will be available today after 330 'cause I'm at work so I figured I'd try to call you at lunchtime but I'm heading back from lunch so anytime after 3:30. If not, then touch base with you again on Monday. All right, thanks. Bye.\"    Returned patients call and LM asking him to return my call.       "

## 2024-12-26 ENCOUNTER — PATIENT OUTREACH (OUTPATIENT)
Dept: HEMATOLOGY ONCOLOGY | Facility: CLINIC | Age: 49
End: 2024-12-26

## 2024-12-26 DIAGNOSIS — C61 PROSTATE CANCER (HCC): Primary | ICD-10-CM

## 2024-12-26 NOTE — PROGRESS NOTES
"\"Yeah. Ladonna, this is Cosmo Lyndaalex calling you back. Sorry, I couldn't take your call earlier, but we were actually in [place] for a second opinion visit. So I could say probably best chance of reaching me would be tomorrow anytime after 315 'cause I work till 3 till I get out and get inside my vehicle it's about 3:15. So anytime tomorrow after 3:15. Alaureliaht, thanks. Bye.\"    I reached out and spoke with Cosmo now that consults have been completed with the oncology teams to review for any barriers to care and offer supportive services as needed. Distress Thermometer completed at this time. Patient scored 3/10. Referral to [facility].. I reviewed and updated the members assigned to the care team in [facility].   He knows the members of the care team as well as how and when to contact them with any needs.   He verbalizes managing the schedules well.   He is currently able to drive and denies any transportation needs.    He denies any uncontrolled symptoms. Discussed role of Palliative Care in symptom and side effect management. Declined referral at this time.  He states that he is eating and drinking as per usual with no unintentional weight loss.     Patient does not smoke.   He states he is well supported by family and friends.  Community support groups discussed including the Cancer Support Community of the [facility]. Patient declined information at this time.   He feels he has adequate insurance coverage and denies any financial concerns at this time.     He has not made treatment decision yet. He had seen [facility] and [facility] for second and third opinions. He states that he will be taking with his family after the new year to make final treatment decision. He will call RAD ONC office with his treatment choice. I will follow up wes  few weeks to see his treatment decision. He knows he will hear from me again if he gets treatment wt [facility].    I have provided my direct contact information and " welcome them to contact me if needs as discussed above change. He was appreciative for the call.

## 2024-12-27 ENCOUNTER — PATIENT OUTREACH (OUTPATIENT)
Dept: CASE MANAGEMENT | Facility: OTHER | Age: 49
End: 2024-12-27

## 2025-01-10 ENCOUNTER — PATIENT OUTREACH (OUTPATIENT)
Dept: CASE MANAGEMENT | Facility: OTHER | Age: 50
End: 2025-01-10

## 2025-01-10 NOTE — PROGRESS NOTES
OSW placed initial outreach call to Mr. Schwab today. LM on VM along with return call information. Will follow.

## 2025-01-17 ENCOUNTER — PATIENT OUTREACH (OUTPATIENT)
Dept: CASE MANAGEMENT | Facility: OTHER | Age: 50
End: 2025-01-17

## 2025-01-17 NOTE — PROGRESS NOTES
OSW placed 2nd call to Mr. Schwab today. LM on VM along with return call information. Noted another opinion was sought at [facility] and [facility]. Will keep on caseload at this time.

## 2025-01-23 ENCOUNTER — RA CDI HCC (OUTPATIENT)
Dept: OTHER | Facility: HOSPITAL | Age: 50
End: 2025-01-23

## 2025-01-23 NOTE — PROGRESS NOTES
HCC coding opportunities       Chart reviewed, no opportunity found: CHART REVIEWED, NO OPPORTUNITY FOUND        Patients Insurance        Commercial Insurance: [employer]

## 2025-01-30 ENCOUNTER — OFFICE VISIT (OUTPATIENT)
Dept: INTERNAL MEDICINE CLINIC | Facility: CLINIC | Age: 50
End: 2025-01-30
Payer: COMMERCIAL

## 2025-01-30 VITALS
SYSTOLIC BLOOD PRESSURE: 128 MMHG | DIASTOLIC BLOOD PRESSURE: 78 MMHG | RESPIRATION RATE: 18 BRPM | TEMPERATURE: 98.3 F | BODY MASS INDEX: 36.96 KG/M2 | OXYGEN SATURATION: 99 % | HEART RATE: 80 BPM | WEIGHT: 230 LBS | HEIGHT: 66 IN

## 2025-01-30 DIAGNOSIS — M32.9 SLE (SYSTEMIC LUPUS ERYTHEMATOSUS RELATED SYNDROME) (HCC): ICD-10-CM

## 2025-01-30 DIAGNOSIS — Z23 NEED FOR PROPHYLACTIC VACCINATION AND INOCULATION AGAINST INFLUENZA: ICD-10-CM

## 2025-01-30 DIAGNOSIS — C61 PROSTATE CANCER (HCC): ICD-10-CM

## 2025-01-30 DIAGNOSIS — E55.9 VITAMIN D DEFICIENCY: ICD-10-CM

## 2025-01-30 DIAGNOSIS — I10 ESSENTIAL HYPERTENSION: Primary | ICD-10-CM

## 2025-01-30 DIAGNOSIS — E78.2 MIXED HYPERLIPIDEMIA: ICD-10-CM

## 2025-01-30 PROCEDURE — 99213 OFFICE O/P EST LOW 20 MIN: CPT | Performed by: INTERNAL MEDICINE

## 2025-01-30 PROCEDURE — 90471 IMMUNIZATION ADMIN: CPT | Performed by: INTERNAL MEDICINE

## 2025-01-30 PROCEDURE — 90673 RIV3 VACCINE NO PRESERV IM: CPT | Performed by: INTERNAL MEDICINE

## 2025-01-30 NOTE — ASSESSMENT & PLAN NOTE
Vitamin D deficiency resolved on vitamin D supplement vitamin D level 55 advised to continue same supplement.

## 2025-01-30 NOTE — ASSESSMENT & PLAN NOTE
Last cholesterol 149, triglyceride 127, HDL 32, LDL 92 advised to continue low-cholesterol low carbs diet.

## 2025-01-30 NOTE — PROGRESS NOTES
Name: Cosmo Schwab      : 1975      MRN: [id number]  Encounter Provider: Tara Caceres MD  Encounter Date: 2025   Encounter department: [facility] INTERNAL MEDICINE  :  Assessment & Plan  Essential hypertension  Blood pressure well-controlled advised to continue present medications and low-salt diet.       Prostate cancer (HCC)  He was seen at [place] specialist he will be going for surgery 2025.       Mixed hyperlipidemia  Last cholesterol 149, triglyceride 127, HDL 32, LDL 92 advised to continue low-cholesterol low carbs diet.       Vitamin D deficiency  Vitamin D deficiency resolved on vitamin D supplement vitamin D level 55 advised to continue same supplement.       BMI 37.0-37.9, adult  Patient  was advised to decrease portion size.  Advised to decrease carb, sugar, cholesterol intake.  Advised to exercise 3-5 times per week.  Advised to lose weight.       SLE (systemic lupus erythematosus related syndrome) (HCC)  Has a rash on the face otherwise asymptomatic.  Had a rheumatology evaluation in the past.       Need for prophylactic vaccination and inoculation against influenza    Orders:  •  influenza vaccine, recombinant, PF, 0.5 mL IM (Flublok)           History of Present Illness   HPI  50-year-old white male patient present for follow-up his medical problems.      Current Outpatient Medications:   •  aspirin 81 mg chewable tablet, Chew 81 mg daily, Disp: , Rfl:   •  atenolol (TENORMIN) 50 mg tablet, Take 1 tablet (50 mg total) by mouth daily, Disp: 90 tablet, Rfl: 1  •  lisinopril (ZESTRIL) 40 mg tablet, Take 1 tablet (40 mg total) by mouth daily, Disp: 90 tablet, Rfl: 1  •  multivitamin (THERAGRAN) TABS, Take 2 tablets by mouth daily, Disp: , Rfl:   •  sildenafil (VIAGRA) 50 MG tablet, Take 1 tablet (50 mg total) by mouth daily as needed for erectile dysfunction, Disp: 30 tablet, Rfl: 3  •  tamsulosin (FLOMAX) 0.4 mg, TAKE ONE CAPSULE BY MOUTH  EVERY DAY AT BEDTIME, Disp: 90 capsule, Rfl: 1  •  Vitamin D, Cholecalciferol, 50 MCG (2000 UT) CAPS, Take 1 capsule by mouth daily, Disp: , Rfl:      Past Medical History:   Diagnosis Date   • Allergic rhinitis    • Annual physical exam 07/30/2024   • BMI 34.0-34.9,adult 03/07/2022   • BMI 35.0-35.9,adult 12/03/2021   • BMI 35.0-35.9,adult 07/30/2024   • BMI 36.0-36.9,adult 08/31/2021   • BMI 37.0-37.9, adult 11/29/2023   • Colon polyps 12/03/2021   • Constipation    • Elevated ALT measurement 12/06/2022   • Elevated liver enzymes    • Enlarged prostate 12/06/2022   • Erectile dysfunction 12/06/2022   • Essential hypertension 02/27/2021   • Fatigue    • Gastric polyps     history of   • Gastroesophageal reflux disease with esophagitis without hemorrhage 12/03/2021   • GERD (gastroesophageal reflux disease)    • Herpes zoster without complication 04/11/2023   • History of shingles    • Hyperglycemia 02/27/2021   • Hypertension    • Lupus    • Mixed hyperlipidemia 02/27/2021   • Nocturia 12/06/2022   • Obesity    • Renal lesion 12/06/2022   • Rosacea    • Rosacea 03/07/2022   • Seizures ([facility])     8 year old sport injury only-no recurrance   • Skin lesion 08/31/2021   • Skin rash 12/07/2022   • SLE (systemic lupus erythematosus related syndrome) ([facility]) 02/27/2021   • Systemic lupus erythematosus (HCC)    • Thrombocytopenia ([facility]) 02/27/2021   • Vitamin D deficiency 02/27/2021      Review of Systems   Constitutional:  Negative for chills and fever.   HENT:  Negative for congestion, ear pain, hearing loss, nosebleeds, sinus pain, sore throat and trouble swallowing.    Eyes:  Negative for discharge, redness and visual disturbance.   Respiratory:  Negative for cough, chest tightness and shortness of breath.    Cardiovascular:  Negative for chest pain and palpitations.   Gastrointestinal:  Negative for abdominal pain, blood in stool, constipation, diarrhea, nausea and vomiting.   Genitourinary:  Negative for dysuria, flank  "pain and hematuria.   Musculoskeletal:  Negative for arthralgias, myalgias and neck pain.   Skin:  Positive for rash (Has a chronic rash on the face.).   Neurological:  Negative for dizziness, speech difficulty, weakness and headaches.   Hematological:  Does not bruise/bleed easily.   Psychiatric/Behavioral:  Negative for agitation and behavioral problems.          Objective   /78 (BP Location: Left arm, Patient Position: Sitting, Cuff Size: Large)   Pulse 80   Temp 98.3 °F (36.8 °C) (Temporal)   Resp 18   Ht 5' 6\" (1.676 m)   Wt 104 kg (230 lb)   SpO2 99%   BMI 37.12 kg/m²      Physical Exam  Vitals and nursing note reviewed.   Constitutional:       General: He is not in acute distress.     Appearance: He is well-developed. He is obese.   HENT:      Head: Normocephalic and atraumatic.      Right Ear: External ear normal.      Left Ear: External ear normal.      Nose: Nose normal.      Mouth/Throat:      Mouth: Mucous membranes are moist.      Pharynx: Oropharynx is clear.   Eyes:      General: No scleral icterus.        Right eye: No discharge.         Left eye: No discharge.      Extraocular Movements: Extraocular movements intact.      Conjunctiva/sclera: Conjunctivae normal.   Cardiovascular:      Rate and Rhythm: Normal rate and regular rhythm.      Pulses: Normal pulses.      Heart sounds: Normal heart sounds. No murmur heard.  Pulmonary:      Effort: Pulmonary effort is normal. No respiratory distress.      Breath sounds: Normal breath sounds. No wheezing, rhonchi or rales.   Abdominal:      General: Bowel sounds are normal.      Palpations: Abdomen is soft.      Tenderness: There is no abdominal tenderness.   Musculoskeletal:         General: No swelling. Normal range of motion.      Cervical back: Normal range of motion and neck supple.      Right lower leg: No edema.      Left lower leg: No edema.   Skin:     General: Skin is warm and dry.      Capillary Refill: Capillary refill takes less " than 2 seconds.      Findings: Rash (Has a chronic rash on the face) present.   Neurological:      General: No focal deficit present.      Mental Status: He is alert and oriented to person, place, and time.      Motor: No weakness.   Psychiatric:         Mood and Affect: Mood normal.         Behavior: Behavior normal.         Thought Content: Thought content normal.         Judgment: Judgment normal.

## 2025-01-30 NOTE — PATIENT INSTRUCTIONS
Patient was advised to continue present medications. discussed with the patient medications and laboratory data in detail.  Follow-up with me in 3 months or as advised.  If any blood test was ordered please do 1 week prior to next appointment unless advise to get earlier.  If you have any questions please call the office [phone]

## 2025-01-31 ENCOUNTER — PATIENT OUTREACH (OUTPATIENT)
Dept: CASE MANAGEMENT | Facility: OTHER | Age: 50
End: 2025-01-31

## 2025-02-19 DIAGNOSIS — I10 ESSENTIAL HYPERTENSION: ICD-10-CM

## 2025-02-19 RX ORDER — ATENOLOL 50 MG/1
50 TABLET ORAL DAILY
Qty: 90 TABLET | Refills: 1 | Status: SHIPPED | OUTPATIENT
Start: 2025-02-19

## 2025-02-19 NOTE — TELEPHONE ENCOUNTER
Dr. Castorena- can you please refill for patient?    Refill request received from atenolol 50mg. Chart shows that patient has not filled since 11/2023. I confirmed with patient that he has been taking the medication. Unsure why it does not show recent fill. Last OV note from 1/30/25 states to continue present medications.

## 2025-02-24 ENCOUNTER — DOCUMENTATION (OUTPATIENT)
Dept: HEMATOLOGY ONCOLOGY | Facility: CLINIC | Age: 50
End: 2025-02-24

## 2025-02-24 NOTE — PROGRESS NOTES
Chart reviewed. Patient getting treated at [facility]. No need for oncology navigation needed moving forward.

## 2025-03-24 ENCOUNTER — OFFICE VISIT (OUTPATIENT)
Age: 50
End: 2025-03-24
Payer: COMMERCIAL

## 2025-03-24 ENCOUNTER — NURSE TRIAGE (OUTPATIENT)
Age: 50
End: 2025-03-24

## 2025-03-24 VITALS
WEIGHT: 227.2 LBS | RESPIRATION RATE: 18 BRPM | BODY MASS INDEX: 36.52 KG/M2 | OXYGEN SATURATION: 98 % | DIASTOLIC BLOOD PRESSURE: 80 MMHG | SYSTOLIC BLOOD PRESSURE: 130 MMHG | HEIGHT: 66 IN | HEART RATE: 97 BPM

## 2025-03-24 DIAGNOSIS — J02.9 PHARYNGITIS, UNSPECIFIED ETIOLOGY: ICD-10-CM

## 2025-03-24 DIAGNOSIS — R05.1 ACUTE COUGH: Primary | ICD-10-CM

## 2025-03-24 LAB
SARS-COV-2 AG UPPER RESP QL IA: NEGATIVE
SL AMB POCT RAPID FLU A: NEGATIVE
SL AMB POCT RAPID FLU B: NEGATIVE
VALID CONTROL: NORMAL

## 2025-03-24 PROCEDURE — 99213 OFFICE O/P EST LOW 20 MIN: CPT | Performed by: INTERNAL MEDICINE

## 2025-03-24 PROCEDURE — 87811 SARS-COV-2 COVID19 W/OPTIC: CPT | Performed by: INTERNAL MEDICINE

## 2025-03-24 PROCEDURE — 87804 INFLUENZA ASSAY W/OPTIC: CPT | Performed by: INTERNAL MEDICINE

## 2025-03-24 RX ORDER — AZITHROMYCIN 250 MG/1
TABLET, FILM COATED ORAL
Qty: 6 TABLET | Refills: 0 | Status: SHIPPED | OUTPATIENT
Start: 2025-03-24 | End: 2025-03-28

## 2025-03-24 NOTE — PROGRESS NOTES
Name: Cosmo Schwab      : 1975      MRN: [id number]  Encounter Provider: Garrick Cobb MD  Encounter Date: 3/24/2025   Encounter department: [facility] PRIMARY CARE  :  Assessment & Plan  Acute cough    Orders:    POCT Rapid Covid Ag    POCT rapid flu A and B    Pharyngitis, unspecified etiology  Is COVID and flu test and negative, symptoms are likely bacterial/viral etiology.  Discussed supportive management encourage hydration, patient has upcoming prostate surgery scheduled.  Complete antibiotic, follow-up with persistent symptoms  Orders:    azithromycin (Zithromax) 250 mg tablet; Take 2 tablets (500 mg total) by mouth daily for 1 day, THEN 1 tablet (250 mg total) daily for 4 days.           History of Present Illness   Cough  Associated symptoms include headaches. Pertinent negatives include no chest pain, chills, ear pain, fever, myalgias, postnasal drip, rash, rhinorrhea, sore throat, shortness of breath or wheezing.     Review of Systems   Constitutional:  Negative for activity change, appetite change, chills, diaphoresis, fatigue, fever and unexpected weight change.   HENT:  Positive for congestion. Negative for drooling, ear discharge, ear pain, facial swelling, hearing loss, postnasal drip, rhinorrhea, sinus pressure, sinus pain, sneezing, sore throat, tinnitus, trouble swallowing and voice change.    Eyes:  Negative for discharge.   Respiratory:  Positive for cough. Negative for apnea, choking, chest tightness, shortness of breath, wheezing and stridor.    Cardiovascular:  Negative for chest pain, palpitations and leg swelling.   Gastrointestinal:  Negative for abdominal distention, abdominal pain, blood in stool, constipation, diarrhea, nausea, rectal pain and vomiting.   Genitourinary:  Negative for dysuria, flank pain, frequency and urgency.   Musculoskeletal:  Negative for arthralgias, back pain, gait problem, joint swelling and myalgias.   Skin:  Negative for color  change, pallor and rash.   Neurological:  Positive for headaches. Negative for dizziness.     Past Medical History   Past Medical History:   Diagnosis Date    Allergic rhinitis     Annual physical exam 07/30/2024    BMI 34.0-34.9,adult 03/07/2022    BMI 35.0-35.9,adult 12/03/2021    BMI 35.0-35.9,adult 07/30/2024    BMI 36.0-36.9,adult 08/31/2021    BMI 37.0-37.9, adult 11/29/2023    Colon polyps 12/03/2021    Constipation     Elevated ALT measurement 12/06/2022    Elevated liver enzymes     Enlarged prostate 12/06/2022    Erectile dysfunction 12/06/2022    Essential hypertension 02/27/2021    Fatigue     Gastric polyps     history of    Gastroesophageal reflux disease with esophagitis without hemorrhage 12/03/2021    GERD (gastroesophageal reflux disease)     Herpes zoster without complication 04/11/2023    History of shingles     Hyperglycemia 02/27/2021    Hypertension     Lupus     Mixed hyperlipidemia 02/27/2021    Nocturia 12/06/2022    Obesity     Renal lesion 12/06/2022    Rosacea     Rosacea 03/07/2022    Seizures (HCC)     8 year old sport injury only-no recurrance    Skin lesion 08/31/2021    Skin rash 12/07/2022    SLE (systemic lupus erythematosus related syndrome) (HCC) 02/27/2021    Systemic lupus erythematosus (HCC)     Thrombocytopenia (HCC) 02/27/2021    Vitamin D deficiency 02/27/2021     Past Surgical History:   Procedure Laterality Date    COLONOSCOPY  01/12/2018    by Dr. Daivd; needs f/u 1/2023     EGD      SKIN BIOPSY       Family History   Problem Relation Age of Onset    Dementia Mother     Colon cancer Father     Cancer Father         colon    Lung cancer Paternal Grandfather     Colon cancer Paternal Aunt     Skin cancer Paternal Uncle     Colon cancer Maternal Aunt       reports that he has quit smoking. His smoking use included cigars. He has never used smokeless tobacco. He reports current alcohol use. He reports current drug use. Drug: Marijuana.  Current Outpatient Medications  "  Medication Instructions    aspirin 81 mg, Daily    atenolol (TENORMIN) 50 mg, Oral, Daily    azithromycin (Zithromax) 250 mg tablet Take 2 tablets (500 mg total) by mouth daily for 1 day, THEN 1 tablet (250 mg total) daily for 4 days.    lisinopril (ZESTRIL) 40 mg, Oral, Daily    multivitamin (THERAGRAN) TABS 2 tablets, Daily    sildenafil (VIAGRA) 50 mg, Oral, Daily PRN    tamsulosin (FLOMAX) 0.4 mg, Oral, Daily at bedtime    Vitamin D, Cholecalciferol, 50 MCG (2000 UT) CAPS 1 capsule, Daily     Allergies   Allergen Reactions    Omeprazole GI Intolerance     He developed constipation      Objective   /80 (BP Location: Left arm, Patient Position: Sitting, Cuff Size: Standard)   Pulse 97   Resp 18   Ht 5' 6\" (1.676 m)   Wt 103 kg (227 lb 3.2 oz)   SpO2 98%   BMI 36.67 kg/m²      Physical Exam  Constitutional:       General: He is not in acute distress.     Appearance: Normal appearance. He is normal weight. He is not ill-appearing, toxic-appearing or diaphoretic.   HENT:      Right Ear: Tympanic membrane normal.      Left Ear: Tympanic membrane normal.      Mouth/Throat:      Pharynx: Posterior oropharyngeal erythema present. No oropharyngeal exudate.   Cardiovascular:      Rate and Rhythm: Normal rate and regular rhythm.      Pulses: Normal pulses.      Heart sounds: Normal heart sounds. No murmur heard.     No gallop.   Pulmonary:      Effort: Pulmonary effort is normal. No respiratory distress.      Breath sounds: Normal breath sounds. No stridor. No wheezing, rhonchi or rales.   Chest:      Chest wall: No tenderness.   Musculoskeletal:      Right lower leg: No edema.      Left lower leg: No edema.   Neurological:      Mental Status: He is alert and oriented to person, place, and time.       "

## 2025-03-24 NOTE — TELEPHONE ENCOUNTER
"FOLLOW UP: no follow up    REASON FOR CONVERSATION: Cough, Fever, and Nasal Congestion    SYMPTOMS: Patient called with productive cough,runny nose and temp today,symptoms started on 3/20.Patient denies sob ,chest pain and wheezing.    OTHER: Patient scheduled for prostate surgery on 3/28.Patient was advised to contact pcp.   No appts available at the office ,advised urgent care evaluation     DISPOSITION: urgent care eval  Reason for Disposition   Continuous (nonstop) coughing interferes with work or school and no improvement using cough treatment per Care Advice    Answer Assessment - Initial Assessment Questions  1. ONSET: \"When did the cough begin?\"       3/20  2. SEVERITY: \"How bad is the cough today?\"       Moderate  3. SPUTUM: \"Describe the color of your sputum\" (e.g., none, dry cough; clear, white, yellow, green)      Yes yellowish green  4. HEMOPTYSIS: \"Are you coughing up any blood?\" If Yes, ask: \"How much?\" (e.g., flecks, streaks, tablespoons, etc.)      No   5. DIFFICULTY BREATHING: \"Are you having difficulty breathing?\" If Yes, ask: \"How bad is it?\" (e.g., mild, moderate, severe)       No   6. FEVER: \"Do you have a fever?\" If Yes, ask: \"What is your temperature, how was it measured, and when did it start?\"      99.0 oral   7. CARDIAC HISTORY: \"Do you have any history of heart disease?\" (e.g., heart attack, congestive heart failure)       Hypertension   8. LUNG HISTORY: \"Do you have any history of lung disease?\"  (e.g., pulmonary embolus, asthma, emphysema)      No   RISK FACTORS: \"Do you have a history of blood clots?\" (or: recent major surgery, recent prolonged travel, bedridden)      NO   10. OTHER SYMPTOMS: \"Do you have any other symptoms?\" (e.g., runny nose, wheezing, chest pain)        RUNNY NOSE  DENIES WHEEZING ,CHEST PAIN     12. TRAVEL: \"Have you traveled out of the country in the last month?\" (e.g., travel history, exposures)        NO  PATIENT SCHEDULED FOR  PROSTATE SURGERY ON " 3/28    Protocols used: Cough-Adult-OH  Patient called with productive cough,runny nose and temp today,symptoms started on 3/20.Patient denies sob ,chest pain and wheezing.  Patient scheduled for prostate surgery on 3/28.Patient was advised to contact pcp.   No appts available at the office ,advised urgent care evaluation

## 2025-03-25 ENCOUNTER — HOSPITAL ENCOUNTER (OUTPATIENT)
Dept: RADIOLOGY | Facility: HOSPITAL | Age: 50
Discharge: HOME | End: 2025-03-25
Payer: COMMERCIAL

## 2025-03-25 DIAGNOSIS — R05.9 COUGH, UNSPECIFIED TYPE: ICD-10-CM

## 2025-03-25 DIAGNOSIS — N40.1 BENIGN LOCALIZED PROSTATIC HYPERPLASIA WITH LOWER URINARY TRACT SYMPTOMS (LUTS): ICD-10-CM

## 2025-03-25 PROCEDURE — 71046 X-RAY EXAM CHEST 2 VIEWS: CPT

## 2025-03-26 DIAGNOSIS — I10 ESSENTIAL HYPERTENSION: ICD-10-CM

## 2025-03-26 RX ORDER — LISINOPRIL 40 MG/1
40 TABLET ORAL DAILY
Qty: 90 TABLET | Refills: 1 | Status: SHIPPED | OUTPATIENT
Start: 2025-03-26

## 2025-03-26 RX ORDER — TAMSULOSIN HYDROCHLORIDE 0.4 MG/1
0.4 CAPSULE ORAL
Qty: 90 CAPSULE | Refills: 1 | Status: SHIPPED | OUTPATIENT
Start: 2025-03-26

## 2025-05-05 ENCOUNTER — OFFICE VISIT (OUTPATIENT)
Dept: INTERNAL MEDICINE CLINIC | Facility: CLINIC | Age: 50
End: 2025-05-05
Payer: COMMERCIAL

## 2025-05-05 VITALS
HEART RATE: 80 BPM | OXYGEN SATURATION: 97 % | TEMPERATURE: 98.6 F | SYSTOLIC BLOOD PRESSURE: 128 MMHG | DIASTOLIC BLOOD PRESSURE: 80 MMHG | RESPIRATION RATE: 16 BRPM | BODY MASS INDEX: 36.64 KG/M2 | WEIGHT: 228 LBS | HEIGHT: 66 IN

## 2025-05-05 DIAGNOSIS — N52.8 OTHER MALE ERECTILE DYSFUNCTION: ICD-10-CM

## 2025-05-05 DIAGNOSIS — R73.9 HYPERGLYCEMIA: ICD-10-CM

## 2025-05-05 DIAGNOSIS — I10 ESSENTIAL HYPERTENSION: Primary | ICD-10-CM

## 2025-05-05 DIAGNOSIS — E55.9 VITAMIN D DEFICIENCY: ICD-10-CM

## 2025-05-05 DIAGNOSIS — E78.2 MIXED HYPERLIPIDEMIA: ICD-10-CM

## 2025-05-05 DIAGNOSIS — D69.6 THROMBOCYTOPENIA (HCC): ICD-10-CM

## 2025-05-05 DIAGNOSIS — C61 PROSTATE CANCER (HCC): ICD-10-CM

## 2025-05-05 DIAGNOSIS — E66.9 OBESITY (BMI 30-39.9): ICD-10-CM

## 2025-05-05 PROCEDURE — 99213 OFFICE O/P EST LOW 20 MIN: CPT | Performed by: INTERNAL MEDICINE

## 2025-05-05 RX ORDER — TADALAFIL 5 MG/1
5 TABLET ORAL DAILY
COMMUNITY
Start: 2025-04-15

## 2025-05-05 NOTE — ASSESSMENT & PLAN NOTE
Fasting blood sugar 113.  Last time hemoglobin A1c was 5.4.  Advised to watch diet for sugar and carbs intake will follow blood sugar hemoglobin A1c.  Orders:  •  Comprehensive metabolic panel; Future  •  Hemoglobin A1C; Future

## 2025-05-05 NOTE — ASSESSMENT & PLAN NOTE
Platelet count improved from 129-132K with normal WBC and hemoglobin.  Will follow CBC.  Orders:  •  CBC and differential; Future

## 2025-05-05 NOTE — ASSESSMENT & PLAN NOTE
Blood pressure well-controlled.  Advised to continue present medications and low-salt diet.  Orders:  •  Comprehensive metabolic panel; Future  •  CBC and differential; Future

## 2025-05-05 NOTE — PATIENT INSTRUCTIONS
Patient was advised to continue present medications. discussed with the patient medications and laboratory data in detail.  Follow-up with me in 3 months or as advised.  If any blood test was ordered please do 1 week prior to next appointment unless advise to get earlier.  If you have any questions please call the office [phone]

## 2025-05-05 NOTE — ASSESSMENT & PLAN NOTE
Diet controlled.  Cholesterol 149, triglyceride 127, HDL 32, LDL 92 advised to continue low-cholesterol low-carb diet we will follow lipid panel.  Orders:  •  Lipid panel; Future

## 2025-05-05 NOTE — ASSESSMENT & PLAN NOTE
Vitamin D deficiency resolved on vitamin D supplement vitamin D level 55 advised to continue same supplement.  Orders:  •  Comprehensive metabolic panel; Future

## 2025-05-05 NOTE — ASSESSMENT & PLAN NOTE
Patient was advised to decrease calorie intake, decrease sugar and carbs intake as well as exercise as much as he can to lose weight.

## 2025-05-05 NOTE — PROGRESS NOTES
Name: Cosmo Schwab      : 1975      MRN: [id number]  Encounter Provider: Tara Caceres MD  Encounter Date: 2025   Encounter department: [facility] INTERNAL MEDICINE  :  Assessment & Plan  Essential hypertension  Blood pressure well-controlled.  Advised to continue present medications and low-salt diet.  Orders:  •  Comprehensive metabolic panel; Future  •  CBC and differential; Future    Prostate cancer (HCC)  Patient underwent robotic prostatectomy at [facility] on 2025.  Getting better and improving.  Orders:  •  Comprehensive metabolic panel; Future  •  CBC and differential; Future    Thrombocytopenia (HCC)  Platelet count improved from 129-132K with normal WBC and hemoglobin.  Will follow CBC.  Orders:  •  CBC and differential; Future    Mixed hyperlipidemia  Diet controlled.  Cholesterol 149, triglyceride 127, HDL 32, LDL 92 advised to continue low-cholesterol low-carb diet we will follow lipid panel.  Orders:  •  Lipid panel; Future    Hyperglycemia  Fasting blood sugar 113.  Last time hemoglobin A1c was 5.4.  Advised to watch diet for sugar and carbs intake will follow blood sugar hemoglobin A1c.  Orders:  •  Comprehensive metabolic panel; Future  •  Hemoglobin A1C; Future    Other male erectile dysfunction  After his prostate surgery now he takes Cialis 5 mg daily.       Vitamin D deficiency  Vitamin D deficiency resolved on vitamin D supplement vitamin D level 55 advised to continue same supplement.  Orders:  •  Comprehensive metabolic panel; Future    Obesity (BMI 30-39.9)  Patient was advised to decrease calorie intake, decrease sugar and carbs intake as well as exercise as much as he can to lose weight.           He was advised to get Shingrix vaccination.     History of Present Illness   HPI  50-year-old white male patient present for follow-up his medical problem.  He underwent his prostate surgery.      Current Outpatient Medications:   •   aspirin 81 mg chewable tablet, Chew 81 mg daily, Disp: , Rfl:   •  atenolol (TENORMIN) 50 mg tablet, Take 1 tablet (50 mg total) by mouth daily, Disp: 90 tablet, Rfl: 1  •  lisinopril (ZESTRIL) 40 mg tablet, Take 1 tablet (40 mg total) by mouth daily, Disp: 90 tablet, Rfl: 1  •  multivitamin (THERAGRAN) TABS, Take 1 tablet by mouth daily, Disp: , Rfl:   •  tadalafil (CIALIS) 5 MG tablet, Take 5 mg by mouth daily, Disp: , Rfl:   •  Vitamin D, Cholecalciferol, 50 MCG (2000 UT) CAPS, Take 1 capsule by mouth daily, Disp: , Rfl:      Past Medical History:   Diagnosis Date   • Allergic rhinitis    • Annual physical exam 07/30/2024   • BMI 34.0-34.9,adult 03/07/2022   • BMI 35.0-35.9,adult 12/03/2021   • BMI 35.0-35.9,adult 07/30/2024   • BMI 36.0-36.9,adult 08/31/2021   • BMI 37.0-37.9, adult 11/29/2023   • Colon polyps 12/03/2021   • Constipation    • Elevated ALT measurement 12/06/2022   • Elevated liver enzymes    • Enlarged prostate 12/06/2022   • Erectile dysfunction 12/06/2022   • Essential hypertension 02/27/2021   • Fatigue    • Gastric polyps     history of   • Gastroesophageal reflux disease with esophagitis without hemorrhage 12/03/2021   • GERD (gastroesophageal reflux disease)    • Herpes zoster without complication 04/11/2023   • History of shingles    • Hyperglycemia 02/27/2021   • Hypertension    • Lupus    • Mixed hyperlipidemia 02/27/2021   • Nocturia 12/06/2022   • Obesity    • Obesity (BMI 30-39.9) 05/05/2025   • Renal lesion 12/06/2022   • Rosacea    • Rosacea 03/07/2022   • Seizures (HCC)     8 year old sport injury only-no recurrance   • Skin lesion 08/31/2021   • Skin rash 12/07/2022   • SLE (systemic lupus erythematosus related syndrome) (HCC) 02/27/2021   • Systemic lupus erythematosus (HCC)    • Thrombocytopenia (HCC) 02/27/2021   • Vitamin D deficiency 02/27/2021      Review of Systems   Constitutional:  Negative for chills and fever.   HENT:  Negative for congestion, ear pain, hearing loss,  "nosebleeds, sinus pain, sore throat and trouble swallowing.    Eyes:  Negative for discharge, redness and visual disturbance.   Respiratory:  Negative for cough, chest tightness and shortness of breath.    Cardiovascular:  Negative for chest pain and palpitations.   Gastrointestinal:  Negative for abdominal pain, blood in stool, constipation, diarrhea, nausea and vomiting.   Genitourinary:  Negative for dysuria, flank pain and hematuria.   Musculoskeletal:  Negative for arthralgias, myalgias and neck pain.   Skin:  Negative for rash.   Neurological:  Negative for dizziness, speech difficulty, weakness and headaches.   Hematological:  Does not bruise/bleed easily.   Psychiatric/Behavioral:  Negative for agitation and behavioral problems.        Objective   /80 (BP Location: Left arm, Patient Position: Sitting, Cuff Size: Large)   Pulse 80   Temp 98.6 °F (37 °C) (Temporal)   Resp 16   Ht 5' 6\" (1.676 m)   Wt 103 kg (228 lb)   SpO2 97%   BMI 36.80 kg/m²      Physical Exam  Vitals and nursing note reviewed.   Constitutional:       General: He is not in acute distress.     Appearance: He is well-developed. He is obese.   HENT:      Head: Normocephalic and atraumatic.      Right Ear: External ear normal.      Left Ear: External ear normal.      Nose: Nose normal.      Mouth/Throat:      Mouth: Mucous membranes are moist.      Pharynx: Oropharynx is clear.   Eyes:      General: No scleral icterus.        Right eye: No discharge.         Left eye: No discharge.      Extraocular Movements: Extraocular movements intact.      Conjunctiva/sclera: Conjunctivae normal.   Cardiovascular:      Rate and Rhythm: Normal rate and regular rhythm.      Pulses: Normal pulses.      Heart sounds: No murmur heard.  Pulmonary:      Effort: Pulmonary effort is normal. No respiratory distress.      Breath sounds: Normal breath sounds. No wheezing or rales.   Abdominal:      General: Bowel sounds are normal. There is no distension. "      Palpations: Abdomen is soft.      Tenderness: There is no abdominal tenderness.   Musculoskeletal:         General: No swelling. Normal range of motion.      Cervical back: Normal range of motion and neck supple.      Right lower leg: No edema.      Left lower leg: No edema.   Skin:     General: Skin is warm and dry.      Capillary Refill: Capillary refill takes less than 2 seconds.   Neurological:      General: No focal deficit present.      Mental Status: He is alert and oriented to person, place, and time.   Psychiatric:         Mood and Affect: Mood normal.         Behavior: Behavior normal.

## 2025-05-28 ENCOUNTER — OFFICE VISIT (OUTPATIENT)
Dept: INTERNAL MEDICINE CLINIC | Facility: CLINIC | Age: 50
End: 2025-05-28
Payer: COMMERCIAL

## 2025-05-28 VITALS
DIASTOLIC BLOOD PRESSURE: 80 MMHG | SYSTOLIC BLOOD PRESSURE: 150 MMHG | HEART RATE: 89 BPM | BODY MASS INDEX: 36.32 KG/M2 | TEMPERATURE: 98.5 F | HEIGHT: 66 IN | WEIGHT: 226 LBS | OXYGEN SATURATION: 99 %

## 2025-05-28 DIAGNOSIS — J01.00 ACUTE NON-RECURRENT MAXILLARY SINUSITIS: Primary | ICD-10-CM

## 2025-05-28 DIAGNOSIS — J20.9 ACUTE INFECTIVE TRACHEOBRONCHITIS: ICD-10-CM

## 2025-05-28 PROCEDURE — 99213 OFFICE O/P EST LOW 20 MIN: CPT | Performed by: INTERNAL MEDICINE

## 2025-05-28 RX ORDER — METHYLPREDNISOLONE 4 MG/1
TABLET ORAL
Qty: 21 EACH | Refills: 0 | Status: SHIPPED | OUTPATIENT
Start: 2025-05-28

## 2025-05-28 RX ORDER — CEFUROXIME AXETIL 500 MG/1
500 TABLET ORAL EVERY 12 HOURS SCHEDULED
Qty: 20 TABLET | Refills: 0 | Status: SHIPPED | OUTPATIENT
Start: 2025-05-28 | End: 2025-06-07

## 2025-05-28 NOTE — PATIENT INSTRUCTIONS
"Patient Education     Acute bronchitis in adults   The Basics   Written by the doctors and editors at [facility]   What is bronchitis? -- This is an infection that causes a cough. It happens when the tubes that carry air into the lungs, called the \"bronchi,\" get infected (figure 1).  Usually, bronchitis happens after a person gets a cold or the flu. The viruses that cause the cold or flu infect the bronchi and irritate them. Antibiotics do not help bronchitis.  Bronchitis can also happen when a person gets an infection called \"whooping cough,\" but this is much less common. Whooping cough is caused by bacteria that can infect the bronchi. Most people get vaccines to prevent whooping cough, but the vaccine doesn't always work. Your doctor will be able to tell if you have whooping cough by doing an exam and listening to your cough.  This article is about \"acute\" bronchitis. This is different from \"chronic\" bronchitis, which is a lung disease that most often affects people who smoke.  What are the symptoms of bronchitis? -- The most common symptoms are:   A cough that can last up to a few weeks   Coughing up mucus that is clear, yellow, or green - Green mucus does not always mean that you have a bacterial infection.  You might also have other cold or flu symptoms, like a stuffy nose, sore throat, or headache. People with bronchitis do not usually get a fever.  Will I need tests? -- Most people with bronchitis do not need a test. But if your doctor or nurse is not sure what is causing your cough, they might do tests. For example, they might order a chest X-ray. Or if they think that you might have COVID-19, they will test you for the virus that causes the infection.  How is bronchitis treated? -- Bronchitis almost always goes away on its own. But the cough can take up to 3 weeks to get better, and sometimes even longer.  Doctors do not usually treat bronchitis with antibiotic medicines. That's because bronchitis is usually " caused by a virus, and antibiotics kill bacteria, not viruses. Also, antibiotics can actually cause other problems.  To feel better, you can treat your cold and flu symptoms. You can:   Get lots of rest, and drink plenty of liquids.   Drink hot tea.   Suck on cough drops or hard candy.   Take over-the-counter cough and cold medicines.   Breath in warm, moist air, such as in the shower, over a kettle, or from a humidifier.   Take a pain-relieving medicine if you have cold or flu symptoms like headache, muscle aches, or joint pain.  Avoid smoking or being around others who smoke. This can make your cough worse.  How can I keep from getting bronchitis again? -- You can reduce your chance of getting bronchitis again by keeping the germs that cause bronchitis out of your body. One of the best ways to do this is to wash your hands often with soap and water. If there is no sink nearby, you can use a hand gel with alcohol in it to clean your hands.  How can I keep from spreading germs? -- In addition to washing your hands often, cover your mouth with your elbow when you sneeze or cough. Using your elbow keeps you from getting germs on your hands. If you use a tissue, throw the tissue away and wash your hands.  When should I call the doctor? -- Call for advice if you have:   A fever higher than 100.4°F (38°C), or chills   Chest pain when you cough, trouble breathing, or coughing up blood   A barking cough that makes it hard to talk   Cough and weight loss that you cannot explain   Symptoms that are not getting better after 3 weeks  All topics are updated as new evidence becomes available and our peer review process is complete.  This topic retrieved from [employer] on: May 16, 2024.  Topic 37616 Version 17.0  Release: 32.4.3 - C32.135  © 2024 UpToDate, Inc. and/or its affiliates. All rights reserved.  figure 1: Normal lungs     The lungs sit in the chest, inside the ribcage. They are covered with a thin membrane called the  "\"pleura.\" The windpipe, or trachea, branches into 2 smaller airways called the left and right \"bronchi.\" The space between the lungs is called the \"mediastinum.\" Lymph nodes are located within and around the lungs and mediastinum.  Graphic 64671 Version 14.0  Consumer Information Use and Disclaimer   Disclaimer: This generalized information is a limited summary of diagnosis, treatment, and/or medication information. It is not meant to be comprehensive and should be used as a tool to help the user understand and/or assess potential diagnostic and treatment options. It does NOT include all information about conditions, treatments, medications, side effects, or risks that may apply to a specific patient. It is not intended to be medical advice or a substitute for the medical advice, diagnosis, or treatment of a health care provider based on the health care provider's examination and assessment of a patient's specific and unique circumstances. Patients must speak with a health care provider for complete information about their health, medical questions, and treatment options, including any risks or benefits regarding use of medications. This information does not endorse any treatments or medications as safe, effective, or approved for treating a specific patient. UpToDate, Inc. and its affiliates disclaim any warranty or liability relating to this information or the use thereof.The use of this information is governed by the Terms of Use, available at https://www.[employer].com/en/know/clinical-effectiveness-terms. 2024© UpToDate, Inc. and its affiliates and/or licensors. All rights reserved.  Copyright   © 2024 UpToDate, Inc. and/or its affiliates. All rights reserved.    "

## 2025-05-28 NOTE — PROGRESS NOTES
Name: Cosmo Schwab      : 1975      MRN: [id number]  Encounter Provider: Elvira Castorena MD  Encounter Date: 2025   Encounter department: [facility] INTERNAL MEDICINE  :  Assessment & Plan  Acute non-recurrent maxillary sinusitis  Symptoms for 1 week started with sore throat nasal congestion low-grade fever chills aches sore throat subsided for last 4 days been coughing yellow sputum along with nasal congestion body ache weak tired fatigue no difficulty breathing no chest pain did not test for influenza or COVID claims been too late will treat as follows    Ceftin 500 twice a day for 10 days due to the associated acute sinusitis    Over-the-counter Robitussin DM DM 2 teaspoonful 3 times a day as needed or Mucinex DM twice a day and plenty of fluid  Orders:  •  cefuroxime (CEFTIN) 500 mg tablet; Take 1 tablet (500 mg total) by mouth every 12 (twelve) hours for 10 days  •  methylPREDNISolone 4 MG tablet therapy pack; Use as directed on package    Acute infective tracheobronchitis  Symptoms for 1 week started with sore throat nasal congestion low-grade fever chills aches sore throat subsided for last 4 days been coughing yellow sputum along with nasal congestion body ache weak tired fatigue no difficulty breathing no chest pain did not test for influenza or COVID claims been too late also complains of thick yellowish nasal discharge for last 3 days with last 24 hours twice the discharge was blood-tinged and pain over the maxillary sinus area and frontal headache suggesting suspected acute sinusitis will treat as follows    Ceftin 500mg to twice a day for 10 days  Mucinex DM twice a day    Orders:  •  cefuroxime (CEFTIN) 500 mg tablet; Take 1 tablet (500 mg total) by mouth every 12 (twelve) hours for 10 days  •  methylPREDNISolone 4 MG tablet therapy pack; Use as directed on package           History of Present Illness   Symptoms for 1 week started with sore throat nasal congestion  low-grade fever chills aches sore throat subsided for last 4 days been coughing yellow sputum along with nasal congestion body ache weak tired fatigue no difficulty breathing no chest pain did not test for influenza or COVID claims been too late also complains of thick yellowish nasal discharge for last 3 days with last 24 hours twice the discharge was blood-tinged and pain over the maxillary sinus area and frontal headache suggesting suspected acute sinusitis will treat as follows      Review of Systems   Constitutional:  Negative for activity change, appetite change, chills, diaphoresis, fatigue, fever and unexpected weight change.   HENT:  Positive for congestion, postnasal drip, rhinorrhea and sinus pressure. Negative for dental problem, drooling, ear discharge, ear pain, facial swelling, hearing loss, mouth sores, nosebleeds, sneezing, sore throat, tinnitus, trouble swallowing and voice change.    Eyes:  Negative for photophobia, pain, discharge, redness, itching and visual disturbance.   Respiratory:  Positive for cough. Negative for apnea, choking, chest tightness, shortness of breath, wheezing and stridor.    Cardiovascular:  Negative for chest pain, palpitations and leg swelling.   Gastrointestinal:  Negative for abdominal distention, abdominal pain, anal bleeding, blood in stool, constipation, diarrhea, nausea, rectal pain and vomiting.   Endocrine: Negative for cold intolerance, heat intolerance, polydipsia, polyphagia and polyuria.   Genitourinary:  Negative for decreased urine volume, difficulty urinating, dysuria, enuresis, flank pain, frequency, genital sores, hematuria and urgency.   Musculoskeletal:  Negative for arthralgias, back pain, gait problem, joint swelling, myalgias, neck pain and neck stiffness.   Skin:  Negative for color change, pallor, rash and wound.   Allergic/Immunologic: Negative.  Negative for environmental allergies, food allergies and immunocompromised state.   Neurological:   "Negative for dizziness, tremors, seizures, syncope, facial asymmetry, speech difficulty, weakness, light-headedness, numbness and headaches.   Psychiatric/Behavioral:  Negative for agitation, behavioral problems, confusion, decreased concentration, dysphoric mood, hallucinations, self-injury, sleep disturbance and suicidal ideas. The patient is not nervous/anxious and is not hyperactive.        Objective   /80   Pulse 89   Temp 98.5 °F (36.9 °C)   Ht 5' 6\" (1.676 m)   Wt 103 kg (226 lb)   SpO2 99%   BMI 36.48 kg/m²      Physical Exam  Vitals and nursing note reviewed.   Constitutional:       General: He is not in acute distress.     Appearance: He is well-developed. He is not ill-appearing, toxic-appearing or diaphoretic.   HENT:      Head: Normocephalic and atraumatic.      Right Ear: External ear normal.      Left Ear: External ear normal.      Nose: Congestion present.      Mouth/Throat:      Pharynx: No oropharyngeal exudate.     Eyes:      General: Lids are normal. Lids are everted, no foreign bodies appreciated. No scleral icterus.        Right eye: No discharge.         Left eye: No discharge.      Conjunctiva/sclera: Conjunctivae normal.      Pupils: Pupils are equal, round, and reactive to light.     Neck:      Thyroid: No thyromegaly.      Vascular: Normal carotid pulses. No carotid bruit, hepatojugular reflux or JVD.      Trachea: No tracheal tenderness or tracheal deviation.     Cardiovascular:      Rate and Rhythm: Normal rate and regular rhythm.      Pulses: Normal pulses.      Heart sounds: Normal heart sounds. No murmur heard.     No friction rub. No gallop.   Pulmonary:      Effort: Pulmonary effort is normal. No respiratory distress.      Breath sounds: No stridor. Rhonchi present. No wheezing or rales.   Chest:      Chest wall: No tenderness.   Abdominal:      General: Bowel sounds are normal. There is no distension.      Palpations: Abdomen is soft. There is no mass.      Tenderness: " There is no abdominal tenderness. There is no guarding or rebound.     Musculoskeletal:         General: No tenderness or deformity. Normal range of motion.      Cervical back: Normal range of motion and neck supple. No edema, erythema or rigidity. No spinous process tenderness or muscular tenderness. Normal range of motion.   Lymphadenopathy:      Head:      Right side of head: No submental, submandibular, tonsillar, preauricular or posterior auricular adenopathy.      Left side of head: No submental, submandibular, tonsillar, preauricular, posterior auricular or occipital adenopathy.      Cervical: No cervical adenopathy.      Right cervical: No superficial, deep or posterior cervical adenopathy.     Left cervical: No superficial, deep or posterior cervical adenopathy.      Upper Body:      Right upper body: No pectoral adenopathy.      Left upper body: No pectoral adenopathy.     Skin:     General: Skin is warm and dry.      Coloration: Skin is not pale.      Findings: No erythema or rash.     Neurological:      Mental Status: He is alert and oriented to person, place, and time.      Cranial Nerves: No cranial nerve deficit.      Sensory: No sensory deficit.      Motor: No tremor, abnormal muscle tone or seizure activity.      Coordination: Coordination normal.      Gait: Gait normal.      Deep Tendon Reflexes: Reflexes are normal and symmetric. Reflexes normal.     Psychiatric:         Behavior: Behavior normal.         Thought Content: Thought content normal.         Judgment: Judgment normal.

## 2025-05-28 NOTE — LETTER
May 28, 2025     Patient: Cosmo Schwab  YOB: 1975  Date of Visit: 5/28/2025      To Whom it May Concern:    Cosmo Schwab is under my professional care. Cosmo was seen in my office on 5/28/2025. Cosmo is advised no work from May 28, 2025 until June 1, 2025 and advised return to work on June 2, 2025    If you have any questions or concerns, please don't hesitate to call.         Sincerely,          Elvira Castorena MD        CC: No Recipients

## 2025-07-30 ENCOUNTER — APPOINTMENT (OUTPATIENT)
Dept: LAB | Facility: HOSPITAL | Age: 50
End: 2025-07-30
Payer: COMMERCIAL

## 2025-07-30 DIAGNOSIS — C61 PROSTATE CANCER (HCC): ICD-10-CM

## 2025-07-30 DIAGNOSIS — D69.6 THROMBOCYTOPENIA (HCC): ICD-10-CM

## 2025-07-30 DIAGNOSIS — R73.9 HYPERGLYCEMIA: ICD-10-CM

## 2025-07-30 DIAGNOSIS — E55.9 VITAMIN D DEFICIENCY: ICD-10-CM

## 2025-07-30 DIAGNOSIS — E78.2 MIXED HYPERLIPIDEMIA: ICD-10-CM

## 2025-07-30 DIAGNOSIS — I10 ESSENTIAL HYPERTENSION: ICD-10-CM

## 2025-07-30 LAB
ALBUMIN SERPL BCG-MCNC: 4.3 G/DL (ref 3.5–5)
ALP SERPL-CCNC: 58 U/L (ref 34–104)
ALT SERPL W P-5'-P-CCNC: 29 U/L (ref 7–52)
ANION GAP SERPL CALCULATED.3IONS-SCNC: 6 MMOL/L (ref 4–13)
AST SERPL W P-5'-P-CCNC: 23 U/L (ref 13–39)
BASOPHILS # BLD AUTO: 0.02 THOUSANDS/ÂΜL (ref 0–0.1)
BASOPHILS NFR BLD AUTO: 0 % (ref 0–1)
BILIRUB SERPL-MCNC: 0.87 MG/DL (ref 0.2–1)
BUN SERPL-MCNC: 16 MG/DL (ref 5–25)
CALCIUM SERPL-MCNC: 9.1 MG/DL (ref 8.4–10.2)
CHLORIDE SERPL-SCNC: 105 MMOL/L (ref 96–108)
CHOLEST SERPL-MCNC: 152 MG/DL (ref ?–200)
CO2 SERPL-SCNC: 28 MMOL/L (ref 21–32)
CREAT SERPL-MCNC: 0.82 MG/DL (ref 0.6–1.3)
EOSINOPHIL # BLD AUTO: 0.14 THOUSAND/ÂΜL (ref 0–0.61)
EOSINOPHIL NFR BLD AUTO: 3 % (ref 0–6)
ERYTHROCYTE [DISTWIDTH] IN BLOOD BY AUTOMATED COUNT: 12.9 % (ref 11.6–15.1)
EST. AVERAGE GLUCOSE BLD GHB EST-MCNC: 103 MG/DL
GFR SERPL CREATININE-BSD FRML MDRD: 101 ML/MIN/1.73SQ M
GLUCOSE P FAST SERPL-MCNC: 103 MG/DL (ref 65–99)
HBA1C MFR BLD: 5.2 %
HCT VFR BLD AUTO: 43.8 % (ref 36.5–49.3)
HDLC SERPL-MCNC: 29 MG/DL
HGB BLD-MCNC: 15.1 G/DL (ref 12–17)
IMM GRANULOCYTES # BLD AUTO: 0.05 THOUSAND/UL (ref 0–0.2)
IMM GRANULOCYTES NFR BLD AUTO: 1 % (ref 0–2)
LDLC SERPL CALC-MCNC: 95 MG/DL (ref 0–100)
LYMPHOCYTES # BLD AUTO: 1.42 THOUSANDS/ÂΜL (ref 0.6–4.47)
LYMPHOCYTES NFR BLD AUTO: 28 % (ref 14–44)
MCH RBC QN AUTO: 30.3 PG (ref 26.8–34.3)
MCHC RBC AUTO-ENTMCNC: 34.5 G/DL (ref 31.4–37.4)
MCV RBC AUTO: 88 FL (ref 82–98)
MONOCYTES # BLD AUTO: 0.43 THOUSAND/ÂΜL (ref 0.17–1.22)
MONOCYTES NFR BLD AUTO: 9 % (ref 4–12)
NEUTROPHILS # BLD AUTO: 2.98 THOUSANDS/ÂΜL (ref 1.85–7.62)
NEUTS SEG NFR BLD AUTO: 59 % (ref 43–75)
NONHDLC SERPL-MCNC: 123 MG/DL
NRBC BLD AUTO-RTO: 0 /100 WBCS
PLATELET # BLD AUTO: 135 THOUSANDS/UL (ref 149–390)
PMV BLD AUTO: 10.8 FL (ref 8.9–12.7)
POTASSIUM SERPL-SCNC: 4.1 MMOL/L (ref 3.5–5.3)
PROT SERPL-MCNC: 6.8 G/DL (ref 6.4–8.4)
RBC # BLD AUTO: 4.98 MILLION/UL (ref 3.88–5.62)
SODIUM SERPL-SCNC: 139 MMOL/L (ref 135–147)
TRIGL SERPL-MCNC: 138 MG/DL (ref ?–150)
WBC # BLD AUTO: 5.04 THOUSAND/UL (ref 4.31–10.16)

## 2025-07-30 PROCEDURE — 83036 HEMOGLOBIN GLYCOSYLATED A1C: CPT

## 2025-07-30 PROCEDURE — 80061 LIPID PANEL: CPT

## 2025-07-30 PROCEDURE — 85025 COMPLETE CBC W/AUTO DIFF WBC: CPT

## 2025-07-30 PROCEDURE — 80053 COMPREHEN METABOLIC PANEL: CPT

## 2025-07-30 PROCEDURE — 36415 COLL VENOUS BLD VENIPUNCTURE: CPT

## 2025-08-04 ENCOUNTER — OFFICE VISIT (OUTPATIENT)
Dept: INTERNAL MEDICINE CLINIC | Facility: CLINIC | Age: 50
End: 2025-08-04
Payer: COMMERCIAL

## 2025-08-04 VITALS
RESPIRATION RATE: 18 BRPM | OXYGEN SATURATION: 97 % | TEMPERATURE: 97.9 F | SYSTOLIC BLOOD PRESSURE: 128 MMHG | WEIGHT: 226 LBS | DIASTOLIC BLOOD PRESSURE: 82 MMHG | BODY MASS INDEX: 36.32 KG/M2 | HEART RATE: 87 BPM | HEIGHT: 66 IN

## 2025-08-04 DIAGNOSIS — R73.9 HYPERGLYCEMIA: ICD-10-CM

## 2025-08-04 DIAGNOSIS — D69.6 THROMBOCYTOPENIA (HCC): ICD-10-CM

## 2025-08-04 DIAGNOSIS — E78.2 MIXED HYPERLIPIDEMIA: ICD-10-CM

## 2025-08-04 DIAGNOSIS — C61 PROSTATE CANCER (HCC): ICD-10-CM

## 2025-08-04 DIAGNOSIS — N52.8 OTHER MALE ERECTILE DYSFUNCTION: ICD-10-CM

## 2025-08-04 DIAGNOSIS — I10 ESSENTIAL HYPERTENSION: Primary | ICD-10-CM

## 2025-08-04 DIAGNOSIS — Z00.00 ANNUAL PHYSICAL EXAM: ICD-10-CM

## 2025-08-04 PROCEDURE — 99396 PREV VISIT EST AGE 40-64: CPT | Performed by: INTERNAL MEDICINE

## 2025-08-04 PROCEDURE — 99213 OFFICE O/P EST LOW 20 MIN: CPT | Performed by: INTERNAL MEDICINE
